# Patient Record
Sex: MALE | Race: WHITE | NOT HISPANIC OR LATINO | Employment: OTHER | URBAN - METROPOLITAN AREA
[De-identification: names, ages, dates, MRNs, and addresses within clinical notes are randomized per-mention and may not be internally consistent; named-entity substitution may affect disease eponyms.]

---

## 2017-02-27 DIAGNOSIS — N52.2 DRUG-INDUCED ERECTILE DYSFUNCTION: ICD-10-CM

## 2017-02-27 RX ORDER — TADALAFIL 10 MG/1
TABLET ORAL
Qty: 12 TABLET | Refills: 1 | Status: SHIPPED | OUTPATIENT
Start: 2017-02-27 | End: 2017-04-19

## 2017-02-28 ENCOUNTER — TELEPHONE (OUTPATIENT)
Dept: FAMILY MEDICINE | Facility: CLINIC | Age: 57
End: 2017-02-28

## 2017-02-28 DIAGNOSIS — N52.2 DRUG-INDUCED ERECTILE DYSFUNCTION: ICD-10-CM

## 2017-03-01 NOTE — TELEPHONE ENCOUNTER
Cialis       Last Written Prescription Date: 12/14/2016  Last Fill Quantity: 12,  # refills: 0   Last Office Visit with [facility], [facility] or [facility] prescribing provider: 11/5/2015

## 2017-03-02 RX ORDER — TADALAFIL 10 MG
TABLET ORAL
Qty: 12 TABLET | Refills: 0 | Status: SHIPPED
Start: 2017-03-02 | End: 2017-07-03

## 2017-03-02 NOTE — TELEPHONE ENCOUNTER
Your erectile dysfunction Medication is being filled for 1 time refill only due to:  Patient needs to be seen because it has been more than one year since last visit.   LOV with DR. Schoen, 11/4/15. ( MD note from that visit)   He can call for refills as needed. Recommended that he continue to see me once a year and cardiology once a year with our visits 6 months apart.   LOV with Cardiology , 5/3/16.  Will forward to schedulers to call and set up tommie with DR. Schoen.............................TRESA Lai

## 2017-04-19 ENCOUNTER — OFFICE VISIT (OUTPATIENT)
Dept: FAMILY MEDICINE | Facility: CLINIC | Age: 57
End: 2017-04-19
Payer: COMMERCIAL

## 2017-04-19 VITALS
DIASTOLIC BLOOD PRESSURE: 58 MMHG | BODY MASS INDEX: 28.31 KG/M2 | WEIGHT: 209 LBS | HEART RATE: 58 BPM | OXYGEN SATURATION: 98 % | HEIGHT: 72 IN | SYSTOLIC BLOOD PRESSURE: 110 MMHG | TEMPERATURE: 96.3 F

## 2017-04-19 DIAGNOSIS — E78.5 HYPERLIPIDEMIA LDL GOAL <70: ICD-10-CM

## 2017-04-19 DIAGNOSIS — I25.10 CORONARY ARTERY DISEASE INVOLVING NATIVE CORONARY ARTERY OF NATIVE HEART WITHOUT ANGINA PECTORIS: ICD-10-CM

## 2017-04-19 DIAGNOSIS — E78.5 HYPERLIPIDEMIA WITH TARGET LDL LESS THAN 100: ICD-10-CM

## 2017-04-19 DIAGNOSIS — I10 BENIGN ESSENTIAL HYPERTENSION: ICD-10-CM

## 2017-04-19 DIAGNOSIS — Z00.00 ROUTINE GENERAL MEDICAL EXAMINATION AT A HEALTH CARE FACILITY: Primary | ICD-10-CM

## 2017-04-19 DIAGNOSIS — Z11.59 NEED FOR HEPATITIS C SCREENING TEST: ICD-10-CM

## 2017-04-19 PROCEDURE — 99396 PREV VISIT EST AGE 40-64: CPT | Performed by: FAMILY MEDICINE

## 2017-04-19 RX ORDER — EZETIMIBE 10 MG/1
10 TABLET ORAL DAILY
Qty: 90 TABLET | Refills: 3 | Status: SHIPPED | OUTPATIENT
Start: 2017-04-19 | End: 2017-09-11

## 2017-04-19 RX ORDER — ATORVASTATIN CALCIUM 80 MG/1
80 TABLET, FILM COATED ORAL DAILY
Qty: 90 TABLET | Refills: 3 | Status: SHIPPED | OUTPATIENT
Start: 2017-04-19 | End: 2017-10-11

## 2017-04-19 RX ORDER — METOPROLOL SUCCINATE 50 MG/1
50 TABLET, EXTENDED RELEASE ORAL DAILY
Qty: 90 TABLET | Refills: 3 | Status: SHIPPED | OUTPATIENT
Start: 2017-04-19 | End: 2017-06-26

## 2017-04-19 RX ORDER — AMLODIPINE BESYLATE 5 MG/1
5 TABLET ORAL DAILY
Qty: 90 TABLET | Refills: 3 | Status: SHIPPED | OUTPATIENT
Start: 2017-04-19 | End: 2017-06-01

## 2017-04-19 RX ORDER — CLOPIDOGREL BISULFATE 75 MG/1
75 TABLET ORAL DAILY
Qty: 90 TABLET | Refills: 3 | Status: SHIPPED | OUTPATIENT
Start: 2017-04-19 | End: 2017-06-26

## 2017-04-19 NOTE — MR AVS SNAPSHOT
After Visit Summary   4/19/2017    Leonides Paul    MRN: [id number]           Patient Information     Date Of Birth          1960        Visit Information        Provider Department      4/19/2017 12:50 PM Schoen, Gregory G, MD [facility]        Today's Diagnoses     Routine general medical examination at a health care facility    -  1    Hyperlipidemia with target LDL less than 100        Coronary artery disease involving native coronary artery of native heart without angina pectoris        Need for hepatitis C screening test        Benign essential hypertension        Hyperlipidemia LDL goal <70          Care Instructions      Preventive Health Recommendations  Male Ages 50 - 64    Yearly exam:             See your health care provider every year in order to  o   Review health changes.   o   Discuss preventive care.    o   Review your medicines if your doctor has prescribed any.     Have a cholesterol test every 5 years, or more frequently if you are at risk for high cholesterol/heart disease.     Have a diabetes test (fasting glucose) every three years. If you are at risk for diabetes, you should have this test more often.     Have a colonoscopy at age 50, or have a yearly FIT test (stool test). These exams will check for colon cancer.      Talk with your health care provider about whether or not a prostate cancer screening test (PSA) is right for you.    You should be tested each year for STDs (sexually transmitted diseases), if you re at risk.     Shots: Get a flu shot each year. Get a tetanus shot every 10 years.     Nutrition:    Eat at least 5 servings of fruits and vegetables daily.     Eat whole-grain bread, whole-wheat pasta and brown rice instead of white grains and rice.     Talk to your provider about Calcium and Vitamin D.     Lifestyle    Exercise for at least 150 minutes a week (30 minutes a day, 5 days a week). This will help you control your weight and prevent  "disease.     Limit alcohol to one drink per day.     No smoking.     Wear sunscreen to prevent skin cancer.     See your dentist every six months for an exam and cleaning.     See your eye doctor every 1 to 2 years.          Follow-ups after your visit        Your next 10 appointments already scheduled     May 02, 2017  3:30 PM CDT   [facility] with Tay Hernandez MD   [facility]    [address] [place] [id number] [phone]              Who to contact     If you have questions or need follow up information about today's clinic visit or your schedule please contact [facility] directly at [phone].  Normal or non-critical lab and imaging results will be communicated to you by MyChart, letter or phone within 4 business days after the clinic has received the results. If you do not hear from us within 7 days, please contact the clinic through [employer] or phone. If you have a critical or abnormal lab result, we will notify you by phone as soon as possible.  Submit refill requests through [employer] or call your pharmacy and they will forward the refill request to us. Please allow 3 business days for your refill to be completed.          Additional Information About Your Visit        [employer] Information     [employer] lets you send messages to your doctor, view your test results, renew your prescriptions, schedule appointments and more. To sign up, go to [email or web address] . Click on \"Log in\" on the left side of the screen, which will take you to the Welcome page. Then click on \"Sign up Now\" on the right side of the page.     You will be asked to enter the access code listed below, as well as some personal information. Please follow the directions to create your username and password.     Your access code is: [email or web address]  Expires: 2017  4:49 PM     Your access code will  in 90 days. If you need help or a new code, please call " your [facility] clinic or [phone].        Care EveryWhere ID     This is your Care EveryWhere ID. This could be used by other organizations to access your [facility] medical records  [id number]        Your Vitals Were     Pulse Temperature Height Pulse Oximetry BMI (Body Mass Index)       58 96.3  F (35.7  C) (Temporal) 6' (1.829 m) 98% 28.35 kg/m2        Blood Pressure from Last 3 Encounters:   04/19/17 110/58   05/03/16 114/78   11/04/15 112/64    Weight from Last 3 Encounters:   04/19/17 209 lb (94.8 kg)   05/03/16 211 lb 8 oz (95.9 kg)   11/04/15 211 lb (95.7 kg)                 Where to get your medicines      These medications [facility] - [place], [place] - [address] AT [address] to [address] [facility], [facility] [address]     Phone:  [phone]     amLODIPine 5 MG tablet    atorvastatin 80 MG tablet    clopidogrel 75 MG tablet    ezetimibe 10 MG tablet    metoprolol 50 MG 24 hr tablet          Primary Care Provider Office Phone # Fax #    Gregory G Schoen, [phone]       [facility] [address] [place] [address]        Thank you!     Thank you for choosing [facility]  for your care. Our goal is always to provide you with excellent care. Hearing back from our patients is one way we can continue to improve our services. Please take a few minutes to complete the written survey that you may receive in the mail after your visit with us. Thank you!             Your Updated Medication List - Protect others around you: Learn how to safely use, store and throw away your medicines at www.disposemymeds.org.          This list is accurate as of: 4/19/17 11:59 PM.  Always use your most recent med list.                   Brand Name Dispense Instructions for use    * ACE/ARB NOT PRESCRIBED (INTENTIONAL)      by Other route continuous prn.       amLODIPine 5 MG tablet    NORVASC    90 tablet     Take 1 tablet (5 mg) by mouth daily       * ASPIRIN NOT PRESCRIBED    INTENTIONAL     by Other route continuous prn.       atorvastatin 80 MG tablet    LIPITOR    90 tablet    Take 1 tablet (80 mg) by mouth daily       CIALIS 10 MG tablet   Generic drug:  tadalafil     12 tablet    FOR DIRECTIONS ON HOW TO   TAKE THIS MEDICINE, READ   THE ENCLOSED MEDICATION    INFORMATION FORM       clopidogrel 75 MG tablet    PLAVIX    90 tablet    Take 1 tablet (75 mg) by mouth daily       ezetimibe 10 MG tablet    ZETIA    90 tablet    Take 1 tablet (10 mg) by mouth daily       metoprolol 50 MG 24 hr tablet    TOPROL XL    90 tablet    Take 1 tablet (50 mg) by mouth daily       triamcinolone 0.5 % cream    KENALOG    30 g    Apply to affected areas twice daily       * Notice:  This list has 2 medication(s) that are the same as other medications prescribed for you. Read the directions carefully, and ask your doctor or other care provider to review them with you.

## 2017-04-19 NOTE — PROGRESS NOTES
"  SUBJECTIVE:     CC: Leonides Paul is an 56 year old male who presents for preventative health visit.     States he is doing well overall. He \"tweaked\" has left shoulder over the winter and it is slowly getting better but still can feel discomfort. His biggest concern is what it might do to his golf game.      He also is on a new biologic for his psoriasis rash and arthritis and states it does an exceptional job of clearing his skin but is about the same benefit for his joints.  He has not had side effect issues from his meds.      He continues to follow with cardiology and has been very stable with medication management of his coronary disease. He needs labs for his upcoming appointment with Dr. Hernandez.      Healthy Habits:    Do you get at least three servings of calcium containing foods daily (dairy, green leafy vegetables, etc.)? yes    Amount of exercise or daily activities, outside of work: 7 day(s) per week    Problems taking medications regularly No    Medication side effects: No    Have you had an eye exam in the past two years? no    Do you see a dentist twice per year? yes    Do you have sleep apnea, excessive snoring or daytime drowsiness?Patient states that he's having trouble sleeping over the last 2 weeks.      Today's PHQ-2 Score:   PHQ-2 ( 1999 [employer]) 11/4/2015 10/12/2011   Q1: Little interest or pleasure in doing things 0 0   Q2: Feeling down, depressed or hopeless 0 0   PHQ-2 Score 0 0       Abuse: Current or Past(Physical, Sexual or Emotional)- No  Do you feel safe in your environment - Yes    Social History   Substance Use Topics     Smoking status: Former Smoker     Packs/day: 2.00     Years: 12.00     Smokeless tobacco: Not on file      Comment: Quit at age 25     Alcohol use Yes      Comment: beer weekly     The patient does not drink >3 drinks per day nor >7 drinks per week.    Last PSA: No results found for: PSA    Recent Labs   Lab Test  04/30/16   0810  05/04/15   1115  05/14/14   " 0723   CHOL  112  128  117   HDL  32*  45  35*   LDL  65  68  70   TRIG  76  75  61   CHOLHDLRATIO   --   2.8  3.0   NHDL  80   --    --        Reviewed orders with patient. Reviewed health maintenance and updated orders accordingly - Yes    Reviewed and updated as needed this visit by clinical staff         Reviewed and updated as needed this visit by Provider            ROS:  CONSTITUTIONAL:trouble sleeping the past few weeks. Not sure what is different but now goes to bed worrying about not getting sleep which makes matters worse.  He just started taking melatonin last night and wants to see how this will work.   I: Psoriasis controlled.   E: NEGATIVE for vision changes or irritation  ENT: NEGATIVE for ear, mouth and throat problems  R: NEGATIVE for significant cough or SOB  CV: NEGATIVE for chest pain, palpitations or peripheral edema  GI: NEGATIVE for nausea, abdominal pain, heartburn, or change in bowel habits   male: negative for dysuria, hematuria, decreased urinary stream, erectile dysfunction (with viagra), urethral discharge  M: NEGATIVE for significant arthralgias or myalgia  N: NEGATIVE for weakness, dizziness or paresthesias  P: NEGATIVE for changes in mood or affect      OBJECTIVE:     /58 (BP Location: Left arm, Patient Position: Chair, Cuff Size: Adult Regular)  Pulse 58  Temp 96.3  F (35.7  C) (Temporal)  Ht 6' (1.829 m)  Wt 209 lb (94.8 kg)  SpO2 98%  BMI 28.35 kg/m2  EXAM:  GENERAL: healthy, alert and no distress  EYES: Eyes grossly normal to inspection, PERRL and conjunctivae and sclerae normal  HENT: ear canals with a fair amount of wax (he has irrigation solution at home) and TM's normal, nose and mouth without ulcers or lesions  NECK: no adenopathy, no asymmetry, masses, or scars and thyroid normal to palpation  RESP: lungs clear to auscultation - no rales, rhonchi or wheezes  CV: regular rate and rhythm, normal S1 S2, no S3 or S4, no murmur, click or rub, no peripheral edema  and peripheral pulses strong  ABDOMEN: soft, nontender, no hepatosplenomegaly, no masses and bowel sounds normal  MS: no gross musculoskeletal defects noted, no edema  SKIN: no suspicious lesions or rashes  NEURO: Normal strength and tone, mentation intact and speech normal  PSYCH: mentation appears normal, affect normal/bright    ASSESSMENT/PLAN:     (Z00.00) Routine general medical examination at a health care facility  (primary encounter diagnosis)  Comment: Overall in stable health with underlying problems as outlined below well managed. He does have advanced directive at home and will bring that in for his chart. Due for lipids as part of his cardiac monitoring along with renal assessment.   Plan: Lipid Profile with reflex to direct LDL,         Creatinine, ** Albumin Random Urine Quant         FUTURE 1yr            (E78.5) Hyperlipidemia with target LDL less than 100  Comment: Has been stable on current dose of zetia and lipitor 80 mg; labs due.   Plan: Lipid Profile with reflex to direct LDL,         Creatinine, ** Albumin Random Urine Quant         FUTURE 1yr, ALT            (I25.10) Coronary artery disease involving native coronary artery of native heart without angina pectoris  Comment: Stable without angina on medication management. Will renew meds and order labs for his follow up cardiology visit.   Plan: metoprolol (TOPROL XL) 50 MG 24 hr tablet,         clopidogrel (PLAVIX) 75 MG tablet            (Z11.59) Need for hepatitis C screening test  Comment: Some risk factors present. lab ordered.   Plan: **Hepatitis C Screen Reflex to RNA FUTURE         anytime            (I10) Benign essential hypertension  Comment: Stable on current meds; will renew without change.   Plan: amLODIPine (NORVASC) 5 MG tablet            (E78.5) Hyperlipidemia LDL goal <70  Comment: As above.   Plan: atorvastatin (LIPITOR) 80 MG tablet, ezetimibe         (ZETIA) 10 MG tablet              COUNSELING:  Reviewed preventive health  counseling, as reflected in patient instructions       Regular exercise       Healthy diet/nutrition         reports that he has quit smoking. He has a 24.00 pack-year smoking history. He does not have any smokeless tobacco history on file.    Estimated body mass index is 28.68 kg/(m^2) as calculated from the following:    Height as of 5/3/16: 6' (1.829 m).    Weight as of 5/3/16: 211 lb 8 oz (95.9 kg).       Counseling Resources:  ATP IV Guidelines  Pooled Cohorts Equation Calculator  FRAX Risk Assessment  ICSI Preventive Guidelines  Dietary Guidelines for Americans, 2010  USDA's MyPlate  ASA Prophylaxis  Lung CA Screening    Gregory G. Schoen, MD  [facility]

## 2017-04-19 NOTE — NURSING NOTE
Chief Complaint   Patient presents with     Physical     Insomnia     x 2 weeks     Shoulder left     x 1 year       Initial /58 (BP Location: Left arm, Patient Position: Chair, Cuff Size: Adult Regular)  Pulse 58  Temp 96.3  F (35.7  C) (Temporal)  Ht 6' (1.829 m)  Wt 209 lb (94.8 kg)  SpO2 98%  BMI 28.35 kg/m2 Estimated body mass index is 28.35 kg/(m^2) as calculated from the following:    Height as of this encounter: 6' (1.829 m).    Weight as of this encounter: 209 lb (94.8 kg).  Medication Reconciliation: complete  Tea Thompson CMA

## 2017-04-25 ENCOUNTER — TELEPHONE (OUTPATIENT)
Dept: NURSING | Facility: CLINIC | Age: 57
End: 2017-04-25

## 2017-04-25 ENCOUNTER — TELEPHONE (OUTPATIENT)
Dept: FAMILY MEDICINE | Facility: CLINIC | Age: 57
End: 2017-04-25

## 2017-04-25 NOTE — TELEPHONE ENCOUNTER
Clinic Action Needed:Yes  FNA Triage Call  Presenting Problem: Pt was in the clinic on 4/19/17 for general exam and sleep problems, he reports he still is having difficulty sleeping and is wondering what the next step would be?  Routed to: P [id number]  Briana Ruiz MA RN, [facility]

## 2017-04-25 NOTE — TELEPHONE ENCOUNTER
Call Type: Triage Call    Presenting Problem: Clinic Action Needed:Yes  FNA Triage Call  Presenting Problem: Pt was in the clinic on 4/19/17 for general exam  and sleep problems, he reports he still is having difficulty  sleeping and is wondering what the next step would be?  Routed to: P  54262  Briana Ruiz MA, RN, [facility]  Triage Note:  Guideline Title: Sleep Disorders ; Sleep Disorders  Recommended Disposition: See Provider within 72 Hours  Original Inclination: Would have called clinic  Override Disposition: Call Provider within 24 Hours  Intended Action: Follow advice given  Physician Contacted: No  Persistent insomnia resulting in less than 4 hours sleep per night AND not  previously evaluated ?  YES  Symptoms of depression (hopeless, despondent, crying episodes) ? NO  Severe episodes of sleep apnea AND irregular heart beat, breathing problems, or  too dizzy or lightheaded to stand ? NO  Sleep deprivation AND irritable, agitated or multiple episodes of apnea per night  ? NO  Sleep deprivation AND known bipolar disorder or recently stopped sleep medication,  narcotics, alcohol or illicit drugs ? NO  Any homicidal/destructive ideation, any suicidal ideation, any history of suicide  attempts, and/or any history of self destructive behavior ? NO  Symptoms began after a change or starting a new prescription or nonprescription  medicine or alternative medicine / therapy within last 4 weeks ? NO  Sleep deprivation AND active hallucinations, delusions, change in mental status,  or under the influence of alcohol or drugs (legal or illegal) ? NO  Physician Instructions:  Care Advice: Monitor sleep behavior (keep a diary) to establish baseline.  Identify factors that make symptoms worse or more frequent.  CAUTIONS  Avoid the use of stimulants including caffeine (coffee, some soft drinks,  some energy drinks, tea and chocolate), cocaine, and amphetamines.  Also  avoid drinking alcohol.  After provider  evaluation, get at least 30-60 minutes of moderate aerobic  exercise, preferably each day  exercise 3 to 4 hours before you want to sleep. Moderate aerobic exercise  is generally defined as requiring about as much energy as walking 2 miles  in 30 minutes.  Maintain a nutritious diet, with a healthy balance of fresh, natural foods  If recent change in medications (prescription, non prescription, or  alternative), call provider or mental healthcare provider.

## 2017-04-26 NOTE — TELEPHONE ENCOUNTER
Patient called back and has not heard anything yet from Dr. Schoen. Can you please call back when able.  Thank you,  Vera Roberto   for [facility]

## 2017-04-28 ENCOUNTER — TELEPHONE (OUTPATIENT)
Dept: NURSING | Facility: CLINIC | Age: 57
End: 2017-04-28

## 2017-04-28 ENCOUNTER — TELEPHONE (OUTPATIENT)
Dept: FAMILY MEDICINE | Facility: CLINIC | Age: 57
End: 2017-04-28

## 2017-04-28 NOTE — TELEPHONE ENCOUNTER
I called and spoke to the patient. Patient is currently in [place] and unable to see a different provider today here in [place] for his symptoms.   He stated that in the last 2 1/2 weeks he has only had 15 hours of sleep.  At this point her doesn't know if it is a sleeping issue or if it is Anxiety about not sleeping.  I did inform the patient that Dr. Wilson is out till Monday. Provider please advise.  Estevan Joe MA

## 2017-04-28 NOTE — TELEPHONE ENCOUNTER
Call Type: Triage Call    Presenting Problem: Leonides states that he has called the clinic for  the last two days, and has not heard back yet. He says he is still  having sleep issues and wondering what he should do. He would like a  message sent to Dr. Schoen.  Triage Note:  Guideline Title: Information Only Call; No Symptom Triage (Adult)  Recommended Disposition: Provide Information or Advice Only  Original Inclination: Wanted to speak with a nurse  Override Disposition:  Intended Action: Follow advice given  Physician Contacted: No  Follow-up call to recent contact; no triage required. Information provided from  past call documentation, approved references or experience. ?  YES  Requesting regular office appointment ? NO  Sign(s) or symptom(s) associated with a diagnosed condition or with a new illness  ? NO  Requesting information about provider, services or community resources ? NO  Call back to complete assessment/clarification of information from prior caller to  complete triage ? NO  Requesting information and provider is best resource; no triage required. ? NO  Caller not with patient and is unable to provide clinical information about  patient to facilitate triage. ? NO  Requesting provider information for recently scheduled test, procedure; no triage  required. Needed information not available per approved resources or clinical  experience. ? NO  Requesting information not available per approved reference or clinical  experience; no triage required. ? NO  Requesting information regarding scheduled exam, test or procedure; no triage  required. Information provided from approved resources or clinical experience. ?  NO  General information question; no triage required. Information provided from  approved references or knowledge of organization. ? NO  Health information question; person denies any symptoms, no triage required.  Information provided from approved references or clinical experience. ?  NO  Physician Instructions:  Care Advice:

## 2017-04-28 NOTE — TELEPHONE ENCOUNTER
Leonides called the nurse triage line 04/28/2017 @ 8225, requesting a message sent to Dr. Schoen. He states he has called the clinic for the last two days, requesting to hear back from Dr. Schoen. He is still have sleep issues and is wondering what he should do. Please call him at [phone]. Thank you.     Neris Sung RN FNA    Routed to: Dr. Gregory Schoen, and RN roberto

## 2017-04-29 DIAGNOSIS — Z00.00 ROUTINE GENERAL MEDICAL EXAMINATION AT A HEALTH CARE FACILITY: ICD-10-CM

## 2017-04-29 DIAGNOSIS — Z11.59 NEED FOR HEPATITIS C SCREENING TEST: ICD-10-CM

## 2017-04-29 DIAGNOSIS — E78.5 HYPERLIPIDEMIA WITH TARGET LDL LESS THAN 100: ICD-10-CM

## 2017-04-29 LAB
ALT SERPL W P-5'-P-CCNC: 48 U/L (ref 0–70)
CHOLEST SERPL-MCNC: 108 MG/DL
CREAT SERPL-MCNC: 0.82 MG/DL (ref 0.66–1.25)
GFR SERPL CREATININE-BSD FRML MDRD: NORMAL ML/MIN/1.7M2
HDLC SERPL-MCNC: 39 MG/DL
LDLC SERPL CALC-MCNC: 57 MG/DL
NONHDLC SERPL-MCNC: 69 MG/DL
TRIGL SERPL-MCNC: 59 MG/DL

## 2017-04-29 PROCEDURE — 36415 COLL VENOUS BLD VENIPUNCTURE: CPT | Performed by: FAMILY MEDICINE

## 2017-04-29 PROCEDURE — 87522 HEPATITIS C REVRS TRNSCRPJ: CPT | Performed by: FAMILY MEDICINE

## 2017-04-29 PROCEDURE — 80061 LIPID PANEL: CPT | Performed by: FAMILY MEDICINE

## 2017-04-29 PROCEDURE — 86803 HEPATITIS C AB TEST: CPT | Performed by: FAMILY MEDICINE

## 2017-04-29 PROCEDURE — 82565 ASSAY OF CREATININE: CPT | Performed by: FAMILY MEDICINE

## 2017-04-29 PROCEDURE — 84460 ALANINE AMINO (ALT) (SGPT): CPT | Performed by: FAMILY MEDICINE

## 2017-05-01 LAB — HCV AB SERPL QL IA: ABNORMAL

## 2017-05-02 ENCOUNTER — OFFICE VISIT (OUTPATIENT)
Dept: CARDIOLOGY | Facility: CLINIC | Age: 57
End: 2017-05-02
Attending: INTERNAL MEDICINE
Payer: COMMERCIAL

## 2017-05-02 VITALS
WEIGHT: 210.2 LBS | HEIGHT: 72 IN | OXYGEN SATURATION: 97 % | BODY MASS INDEX: 28.47 KG/M2 | HEART RATE: 52 BPM | DIASTOLIC BLOOD PRESSURE: 82 MMHG | SYSTOLIC BLOOD PRESSURE: 118 MMHG

## 2017-05-02 DIAGNOSIS — E78.5 HYPERLIPIDEMIA LDL GOAL <70: Primary | ICD-10-CM

## 2017-05-02 DIAGNOSIS — I25.10 CORONARY ARTERY DISEASE INVOLVING NATIVE CORONARY ARTERY OF NATIVE HEART WITHOUT ANGINA PECTORIS: ICD-10-CM

## 2017-05-02 DIAGNOSIS — I10 BENIGN ESSENTIAL HYPERTENSION: ICD-10-CM

## 2017-05-02 PROCEDURE — 99214 OFFICE O/P EST MOD 30 MIN: CPT | Performed by: INTERNAL MEDICINE

## 2017-05-02 NOTE — PROGRESS NOTES
HPI and Plan:   See dictation    Orders Placed This Encounter   Procedures     Lipid Profile     ALT     Basic metabolic panel     Follow-Up with Cardiologist       Orders Placed This Encounter   Medications     UNABLE TO FIND     Sig: MEDICATION NAME: Taltz injection 70mg once per month     MELATONIN PO     Sig: Take by mouth At Bedtime       There are no discontinued medications.      Encounter Diagnoses   Name Primary?     Coronary artery disease involving native coronary artery of native heart without angina pectoris      Hyperlipidemia LDL goal <70 Yes     Benign essential hypertension        CURRENT MEDICATIONS:  Current Outpatient Prescriptions   Medication Sig Dispense Refill     UNABLE TO FIND MEDICATION NAME: Taltz injection 70mg once per month       MELATONIN PO Take by mouth At Bedtime       amLODIPine (NORVASC) 5 MG tablet Take 1 tablet (5 mg) by mouth daily 90 tablet 3     atorvastatin (LIPITOR) 80 MG tablet Take 1 tablet (80 mg) by mouth daily 90 tablet 3     metoprolol (TOPROL XL) 50 MG 24 hr tablet Take 1 tablet (50 mg) by mouth daily 90 tablet 3     clopidogrel (PLAVIX) 75 MG tablet Take 1 tablet (75 mg) by mouth daily 90 tablet 3     ezetimibe (ZETIA) 10 MG tablet Take 1 tablet (10 mg) by mouth daily 90 tablet 3     CIALIS 10 MG tablet FOR DIRECTIONS ON HOW TO   TAKE THIS MEDICINE, READ   THE ENCLOSED MEDICATION    INFORMATION FORM 12 tablet 0     triamcinolone (KENALOG) 0.5 % cream Apply to affected areas twice daily 30 g 2     ASPIRIN NOT PRESCRIBED, INTENTIONAL, by Other route continuous prn.  0     ACE/ARB NOT PRESCRIBED, INTENTIONAL, by Other route continuous prn.         ALLERGIES     Allergies   Allergen Reactions     No Known Drug Allergies        PAST MEDICAL HISTORY:  Past Medical History:   Diagnosis Date     CAD (coronary artery disease) 7/19/2010     Hyperlipidemia LDL goal < 100 7/19/2010     Hypertension 7/19/2010     Other psoriasis 12/15/2005       PAST SURGICAL HISTORY:  Past  Surgical History:   Procedure Laterality Date     COLONOSCOPY  02/10/10      LAPAROSCOPY, SURGICAL; APPENDECTOMY  7/17/10     REMOVAL OF SPERM DUCT(S)         FAMILY HISTORY:  Family History   Problem Relation Age of Onset     Allergies Daughter      environment al     Allergies Son      same as sister     Alzheimer Disease Father       at age 73     Blood Disease Father      CANCER Maternal Grandfather      [place] lung -     HEART DISEASE Maternal Grandmother      Psychotic Disorder Brother        SOCIAL HISTORY:  Social History     Social History     Marital status:      Spouse name: N/A     Number of children: N/A     Years of education: N/A     Social History Main Topics     Smoking status: Former Smoker     Packs/day: 2.00     Years: 12.00     Smokeless tobacco: Not on file      Comment: Quit at age 25     Alcohol use Yes      Comment: beer weekly     Drug use: No     Sexual activity: Yes     Partners: Female     Other Topics Concern     Not on file     Social History Narrative       Review of Systems:  Skin:  Positive for   psoriasis   Eyes:  Positive for   lasik  ENT:  Negative      Respiratory:  Negative       Cardiovascular:  Negative for;palpitations;chest pain;edema;lightheadedness;dizziness fatigue;Positive for    Gastroenterology: Negative      Genitourinary:  Negative      Musculoskeletal:  Positive for arthritis    Neurologic:  Negative      Psychiatric:  Positive for sleep disturbances 3 weeks of not sleeping well   Heme/Lymph/Imm:  Negative      Endocrine:  Negative        Physical Exam:  Vitals: /82 (BP Location: Right arm, Patient Position: Fowlers, Cuff Size: Adult Large)  Pulse 52  Ht 1.829 m (6')  Wt 95.3 kg (210 lb 3.2 oz)  SpO2 97%  BMI 28.51 kg/m2    Constitutional:  cooperative, alert and oriented, well developed, well nourished, in no acute distress        Skin:  warm and dry to the touch, no apparent skin lesions or masses noted        Head:  normocephalic, no  masses or lesions        Eyes:  pupils equal and round, conjunctivae and lids unremarkable, sclera white, no xanthalasma, EOMS intact, no nystagmus        ENT:  no pallor or cyanosis, dentition good        Neck:  carotid pulses are full and equal bilaterally, JVP normal, no carotid bruit, no thyromegaly        Chest:  normal breath sounds, clear to auscultation, normal A-P diameter, normal symmetry, normal respiratory excursion, no use of accessory muscles;clear to auscultation          Cardiac: regular rhythm, normal S1/S2, no S3 or S4, apical impulse not displaced, no murmurs, gallops or rubs                  Abdomen:  abdomen soft, non-tender, BS normoactive, no mass, no HSM, no bruits        Vascular: pulses full and equal, no bruits auscultated                                        Extremities and Back:  no deformities, clubbing, cyanosis, erythema observed;no edema              Neurological:  affect appropriate, oriented to time, person and place;no gross motor deficits              CC  Tay Hernandez MD   [facility]  [address]  [place] [address]

## 2017-05-02 NOTE — LETTER
5/2/2017    Gregory G. Schoen, MD  [facility]   [address]   [place] [address]    RE: Leonides Paul       Dear Colleague,    I had the opportunity to see Mr. Leonides Paul in Cardiology Clinic today at the [facility] in [place] for reevaluation of coronary artery disease with chronic angina.  As you know, Leonides has a chronically occluded mid LAD with good distal collaterals.  He has been experiencing occasional mild exertional angina for more than 10 years, but we have managed this with medications and it has not affected his lifestyle.  He indicates that he still gets some angina when he goes out for his 3-mile walk.  Typically it occurs during the first mile and is brief and resolves fairly promptly and then he is able to walk for a couple more miles without any discomfort whatsoever.  He believes that over the last 5-10 years, the discomfort may have gotten slowly and slightly worse and he does believe it is perhaps a bit worse than it was a year ago, but he is not bothered by it enough at this point to indicate that he needs any additional treatment.      Currently, he has been bothered by some severe insomnia issues.  He saw you to discuss that recently.      PHYSICAL EXAMINATION:  Today his blood pressure was 118/82, heart rate 52 and weight 210 pounds.  His lungs are clear.  His heart rhythm is regular.  He has no cardiac murmurs or carotid bruits.      His cholesterol numbers remain excellent.  He has normal renal function.     Outpatient Encounter Prescriptions as of 5/2/2017   Medication Sig Dispense Refill     UNABLE TO FIND MEDICATION NAME: Taltz injection 70mg once per month       MELATONIN PO Take by mouth At Bedtime       atorvastatin (LIPITOR) 80 MG tablet Take 1 tablet (80 mg) by mouth daily 90 tablet 3     ezetimibe (ZETIA) 10 MG tablet Take 1 tablet (10 mg) by mouth daily 90 tablet 3     [DISCONTINUED] amLODIPine (NORVASC) 5 MG tablet Take 1  tablet (5 mg) by mouth daily 90 tablet 3     [DISCONTINUED] metoprolol (TOPROL XL) 50 MG 24 hr tablet Take 1 tablet (50 mg) by mouth daily 90 tablet 3     [DISCONTINUED] clopidogrel (PLAVIX) 75 MG tablet Take 1 tablet (75 mg) by mouth daily 90 tablet 3     [DISCONTINUED] CIALIS 10 MG tablet FOR DIRECTIONS ON HOW TO   TAKE THIS MEDICINE, READ   THE ENCLOSED MEDICATION    INFORMATION FORM 12 tablet 0     triamcinolone (KENALOG) 0.5 % cream Apply to affected areas twice daily 30 g 2     ASPIRIN NOT PRESCRIBED, INTENTIONAL, by Other route continuous prn.  0     ACE/ARB NOT PRESCRIBED, INTENTIONAL, by Other route continuous prn.       No facility-administered encounter medications on file as of 5/2/2017.       IMPRESSIONS:  Mr. Leonides Paul is a 56-year-old gentleman with chronic ischemic coronary artery disease with a chronically occluded mid LAD which good collaterals to the distal LAD.  He has some persistent exertional angina but still quite mild.  I offered him some additional medical therapy to help decrease those symptoms such as long-acting nitrate or Ranexa.  At this time, he declines additional medication and wishes to continue his current medication program.  I encouraged him to contact us if he decides that his chest discomfort symptoms are bothersome enough to require additional treatment.      I printed off some information for him from [employer] Date about insomnia.  I will have him come back to discuss those issues with you further if he continues to be bothered by that problem.      I will plan to see him back again in 1 year.     Again, thank you for allowing me to participate in the care of your patient.      Sincerely,    MENDOZA BATES MD     [facility]

## 2017-05-02 NOTE — TELEPHONE ENCOUNTER
"I spoke with Leonides and he purchased an over the counter \"sleep gel\" capsule,which, after exploration, was diphenhydramine.  He has slept well the past two nights.  Will simply observe at this time. I did review his labs with him and his Hep C screen is still pending.   Electronically signed by Greg Schoen, MD    "

## 2017-05-02 NOTE — MR AVS SNAPSHOT
"              After Visit Summary   5/2/2017    Leonides Paul    MRN: [id number]           Patient Information     Date Of Birth          1960        Visit Information        Provider Department      5/2/2017 3:30 PM Tay Hernandez MD [facility]        Today's Diagnoses     Hyperlipidemia LDL goal <70    -  1    Coronary artery disease involving native coronary artery of native heart without angina pectoris        Benign essential hypertension           Follow-ups after your visit        Additional Services     Follow-Up with Cardiologist                 Future tests that were ordered for you today     Open Future Orders        Priority Expected Expires Ordered    ALT Routine 5/2/2018 6/6/2018 5/2/2017    Basic metabolic panel Routine 5/2/2018 6/6/2018 5/2/2017    Follow-Up with Cardiologist Routine 5/2/2018 9/14/2018 5/2/2017    Lipid Profile Routine 5/2/2018 6/6/2018 5/2/2017            Who to contact     If you have questions or need follow up information about today's clinic visit or your schedule please contact [facility] directly at [phone].  Normal or non-critical lab and imaging results will be communicated to you by "[employer]hart, letter or phone within 4 business days after the clinic has received the results. If you do not hear from us within 7 days, please contact the clinic through "[employer]hart or phone. If you have a critical or abnormal lab result, we will notify you by phone as soon as possible.  Submit refill requests through [employer] or call your pharmacy and they will forward the refill request to us. Please allow 3 business days for your refill to be completed.          Additional Information About Your Visit        MyChart Information     [employer] lets you send messages to your doctor, view your test results, renew your prescriptions, schedule appointments and more. To sign up, go to [email or web address] . Click on \"Log in\" on the left side of the screen, " "which will take you to the Welcome page. Then click on \"Sign up Now\" on the right side of the page.     You will be asked to enter the access code listed below, as well as some personal information. Please follow the directions to create your username and password.     Your access code is: [email or web address]  Expires: 2017  4:49 PM     Your access code will  in 90 days. If you need help or a new code, please call your [facility] or [phone].        Care EveryWhere ID     This is your Care EveryWhere ID. This could be used by other organizations to access your [facility] medical records  [id number]        Your Vitals Were     Pulse Height Pulse Oximetry BMI (Body Mass Index)          52 1.829 m (6') 97% 28.51 kg/m2         Blood Pressure from Last 3 Encounters:   17 118/82   17 110/58   16 114/78    Weight from Last 3 Encounters:   17 95.3 kg (210 lb 3.2 oz)   17 94.8 kg (209 lb)   16 95.9 kg (211 lb 8 oz)              We Performed the Following     Follow-Up with Cardiologist        Primary Care Provider Office Phone # Fax #    Gregory G Schoen, [phone]       [facility] [address] [place] [address]        Thank you!     Thank you for choosing [facility]  for your care. Our goal is always to provide you with excellent care. Hearing back from our patients is one way we can continue to improve our services. Please take a few minutes to complete the written survey that you may receive in the mail after your visit with us. Thank you!             Your Updated Medication List - Protect others around you: Learn how to safely use, store and throw away your medicines at www.disposemymeds.org.          This list is accurate as of: 17  3:59 PM.  Always use your most recent med list.                   Brand Name Dispense Instructions for use    * ACE/ARB NOT PRESCRIBED (INTENTIONAL)      by Other route continuous " prn.       amLODIPine 5 MG tablet    NORVASC    90 tablet    Take 1 tablet (5 mg) by mouth daily       * ASPIRIN NOT PRESCRIBED    INTENTIONAL     by Other route continuous prn.       atorvastatin 80 MG tablet    LIPITOR    90 tablet    Take 1 tablet (80 mg) by mouth daily       CIALIS 10 MG tablet   Generic drug:  tadalafil     12 tablet    FOR DIRECTIONS ON HOW TO   TAKE THIS MEDICINE, READ   THE ENCLOSED MEDICATION    INFORMATION FORM       clopidogrel 75 MG tablet    PLAVIX    90 tablet    Take 1 tablet (75 mg) by mouth daily       ezetimibe 10 MG tablet    ZETIA    90 tablet    Take 1 tablet (10 mg) by mouth daily       MELATONIN PO      Take by mouth At Bedtime       metoprolol 50 MG 24 hr tablet    TOPROL XL    90 tablet    Take 1 tablet (50 mg) by mouth daily       triamcinolone 0.5 % cream    KENALOG    30 g    Apply to affected areas twice daily       UNABLE TO FIND      MEDICATION NAME: Taltz injection 70mg once per month       * Notice:  This list has 2 medication(s) that are the same as other medications prescribed for you. Read the directions carefully, and ask your doctor or other care provider to review them with you.

## 2017-05-02 NOTE — LETTER
5/2/2017      RE: Leonides Paul  [address] [facility] [address]       Dear Colleague,    Thank you for the opportunity to participate in the care of your patient, Leonides Paul, at the [facility]. Please see a copy of my visit note below.    HPI and Plan:   See dictation    Orders Placed This Encounter   Procedures     Lipid Profile     ALT     Basic metabolic panel     Follow-Up with Cardiologist       Orders Placed This Encounter   Medications     UNABLE TO FIND     Sig: MEDICATION NAME: Taltz injection 70mg once per month     MELATONIN PO     Sig: Take by mouth At Bedtime       There are no discontinued medications.      Encounter Diagnoses   Name Primary?     Coronary artery disease involving native coronary artery of native heart without angina pectoris      Hyperlipidemia LDL goal <70 Yes     Benign essential hypertension        CURRENT MEDICATIONS:  Current Outpatient Prescriptions   Medication Sig Dispense Refill     UNABLE TO FIND MEDICATION NAME: Taltz injection 70mg once per month       MELATONIN PO Take by mouth At Bedtime       amLODIPine (NORVASC) 5 MG tablet Take 1 tablet (5 mg) by mouth daily 90 tablet 3     atorvastatin (LIPITOR) 80 MG tablet Take 1 tablet (80 mg) by mouth daily 90 tablet 3     metoprolol (TOPROL XL) 50 MG 24 hr tablet Take 1 tablet (50 mg) by mouth daily 90 tablet 3     clopidogrel (PLAVIX) 75 MG tablet Take 1 tablet (75 mg) by mouth daily 90 tablet 3     ezetimibe (ZETIA) 10 MG tablet Take 1 tablet (10 mg) by mouth daily 90 tablet 3     CIALIS 10 MG tablet FOR DIRECTIONS ON HOW TO   TAKE THIS MEDICINE, READ   THE ENCLOSED MEDICATION    INFORMATION FORM 12 tablet 0     triamcinolone (KENALOG) 0.5 % cream Apply to affected areas twice daily 30 g 2     ASPIRIN NOT PRESCRIBED, INTENTIONAL, by Other route continuous prn.  0     ACE/ARB NOT PRESCRIBED, INTENTIONAL, by Other route continuous prn.          ALLERGIES     Allergies   Allergen Reactions     No Known Drug Allergies        PAST MEDICAL HISTORY:  Past Medical History:   Diagnosis Date     CAD (coronary artery disease) 2010     Hyperlipidemia LDL goal < 100 2010     Hypertension 2010     Other psoriasis 12/15/2005       PAST SURGICAL HISTORY:  Past Surgical History:   Procedure Laterality Date     COLONOSCOPY  02/10/10     HC LAPAROSCOPY, SURGICAL; APPENDECTOMY  7/17/10     REMOVAL OF SPERM DUCT(S)         FAMILY HISTORY:  Family History   Problem Relation Age of Onset     Allergies Daughter      environment al     Allergies Son      same as sister     Alzheimer Disease Father       at age 73     Blood Disease Father      CANCER Maternal Grandfather      [place] lung -     HEART DISEASE Maternal Grandmother      Psychotic Disorder Brother        SOCIAL HISTORY:  Social History     Social History     Marital status:      Spouse name: N/A     Number of children: N/A     Years of education: N/A     Social History Main Topics     Smoking status: Former Smoker     Packs/day: 2.00     Years: 12.00     Smokeless tobacco: Not on file      Comment: Quit at age 25     Alcohol use Yes      Comment: beer weekly     Drug use: No     Sexual activity: Yes     Partners: Female     Other Topics Concern     Not on file     Social History Narrative       Review of Systems:  Skin:  Positive for   psoriasis   Eyes:  Positive for   lasik  ENT:  Negative      Respiratory:  Negative       Cardiovascular:  Negative for;palpitations;chest pain;edema;lightheadedness;dizziness fatigue;Positive for    Gastroenterology: Negative      Genitourinary:  Negative      Musculoskeletal:  Positive for arthritis    Neurologic:  Negative      Psychiatric:  Positive for sleep disturbances 3 weeks of not sleeping well   Heme/Lymph/Imm:  Negative      Endocrine:  Negative        Physical Exam:  Vitals: /82 (BP Location: Right arm, Patient Position: Fowlers,  Cuff Size: Adult Large)  Pulse 52  Ht 1.829 m (6')  Wt 95.3 kg (210 lb 3.2 oz)  SpO2 97%  BMI 28.51 kg/m2    Constitutional:  cooperative, alert and oriented, well developed, well nourished, in no acute distress        Skin:  warm and dry to the touch, no apparent skin lesions or masses noted        Head:  normocephalic, no masses or lesions        Eyes:  pupils equal and round, conjunctivae and lids unremarkable, sclera white, no xanthalasma, EOMS intact, no nystagmus        ENT:  no pallor or cyanosis, dentition good        Neck:  carotid pulses are full and equal bilaterally, JVP normal, no carotid bruit, no thyromegaly        Chest:  normal breath sounds, clear to auscultation, normal A-P diameter, normal symmetry, normal respiratory excursion, no use of accessory muscles;clear to auscultation          Cardiac: regular rhythm, normal S1/S2, no S3 or S4, apical impulse not displaced, no murmurs, gallops or rubs                  Abdomen:  abdomen soft, non-tender, BS normoactive, no mass, no HSM, no bruits        Vascular: pulses full and equal, no bruits auscultated                                        Extremities and Back:  no deformities, clubbing, cyanosis, erythema observed;no edema              Neurological:  affect appropriate, oriented to time, person and place;no gross motor deficits              CC  Tay Hernandez MD   [facility]  [address]  [place] [address]        Please do not hesitate to contact me if you have any questions/concerns.     Sincerely,     TAY HERNANDEZ MD

## 2017-05-03 NOTE — PROGRESS NOTES
HISTORY OF PRESENT ILLNESS:  I had the opportunity to see Mr. Leonides Paul in Cardiology Clinic today at the [facility] in [place] for reevaluation of coronary artery disease with chronic angina.  As you know, Leonides has a chronically occluded mid LAD with good distal collaterals.  He has been experiencing occasional mild exertional angina for more than 10 years, but we have managed this with medications and it has not affected his lifestyle.  He indicates that he still gets some angina when he goes out for his 3-mile walk.  Typically it occurs during the first mile and is brief and resolves fairly promptly and then he is able to walk for a couple more miles without any discomfort whatsoever.  He believes that over the last 5-10 years, the discomfort may have gotten slowly and slightly worse and he does believe it is perhaps a bit worse than it was a year ago, but he is not bothered by it enough at this point to indicate that he needs any additional treatment.      Currently, he has been bothered by some severe insomnia issues.  He saw you to discuss that recently.      PHYSICAL EXAMINATION:  Today his blood pressure was 118/82, heart rate 52 and weight 210 pounds.  His lungs are clear.  His heart rhythm is regular.  He has no cardiac murmurs or carotid bruits.      His cholesterol numbers remain excellent.  He has normal renal function.      IMPRESSIONS:  Mr. Leonides Paul is a 56-year-old gentleman with chronic ischemic coronary artery disease with a chronically occluded mid LAD which good collaterals to the distal LAD.  He has some persistent exertional angina but still quite mild.  I offered him some additional medical therapy to help decrease those symptoms such as long-acting nitrate or Ranexa.  At this time, he declines additional medication and wishes to continue his current medication program.  I encouraged him to contact us if he decides that his chest discomfort symptoms are  bothersome enough to require additional treatment.      I printed off some information for him from Up To Date about insomnia.  I will have him come back to discuss those issues with you further if he continues to be bothered by that problem.      I will plan to see him back again in 1 year.      cc:      Gregory [place] [address]         MENDOZA BATES MD, [facility]             D: 2017 16:04   T: 2017 10:59   MT: MERLYN      Name:     JIM FERNANDEZ   MRN:      [id number]        Account:      [id number]   :      1960           Service Date: 2017      Document: [id number]

## 2017-05-04 LAB
HCV RNA SERPL NAA+PROBE-ACNC: NORMAL [IU]/ML
HCV RNA SERPL NAA+PROBE-LOG IU: NORMAL LOG IU/ML

## 2017-06-01 DIAGNOSIS — I10 BENIGN ESSENTIAL HYPERTENSION: ICD-10-CM

## 2017-06-01 RX ORDER — AMLODIPINE BESYLATE 5 MG/1
5 TABLET ORAL DAILY
Qty: 90 TABLET | Refills: 3 | Status: SHIPPED | OUTPATIENT
Start: 2017-06-01 | End: 2018-10-01

## 2017-06-10 DIAGNOSIS — I25.10 CORONARY ARTERY DISEASE INVOLVING NATIVE CORONARY ARTERY OF NATIVE HEART WITHOUT ANGINA PECTORIS: ICD-10-CM

## 2017-06-10 DIAGNOSIS — Z51.81 ENCOUNTER FOR THERAPEUTIC DRUG MONITORING: Primary | ICD-10-CM

## 2017-06-10 LAB
ALBUMIN SERPL-MCNC: 3.7 G/DL (ref 3.4–5)
ALP SERPL-CCNC: 76 U/L (ref 40–150)
ALT SERPL W P-5'-P-CCNC: 44 U/L (ref 0–70)
ANION GAP SERPL CALCULATED.3IONS-SCNC: 10 MMOL/L (ref 3–14)
AST SERPL W P-5'-P-CCNC: 20 U/L (ref 0–45)
BASOPHILS # BLD AUTO: 0 10E9/L (ref 0–0.2)
BASOPHILS NFR BLD AUTO: 0 %
BILIRUB SERPL-MCNC: 0.5 MG/DL (ref 0.2–1.3)
BUN SERPL-MCNC: 16 MG/DL (ref 7–30)
CALCIUM SERPL-MCNC: 8.9 MG/DL (ref 8.5–10.1)
CHLORIDE SERPL-SCNC: 109 MMOL/L (ref 94–109)
CHOLEST SERPL-MCNC: 135 MG/DL
CO2 SERPL-SCNC: 27 MMOL/L (ref 20–32)
CREAT SERPL-MCNC: 0.93 MG/DL (ref 0.66–1.25)
DIFFERENTIAL METHOD BLD: NORMAL
EOSINOPHIL # BLD AUTO: 0.1 10E9/L (ref 0–0.7)
EOSINOPHIL NFR BLD AUTO: 2.4 %
ERYTHROCYTE [DISTWIDTH] IN BLOOD BY AUTOMATED COUNT: 13.2 % (ref 10–15)
GFR SERPL CREATININE-BSD FRML MDRD: 83 ML/MIN/1.7M2
GLUCOSE SERPL-MCNC: 97 MG/DL (ref 70–99)
HCT VFR BLD AUTO: 43 % (ref 40–53)
HDLC SERPL-MCNC: 34 MG/DL
HGB BLD-MCNC: 14.9 G/DL (ref 13.3–17.7)
IMM GRANULOCYTES # BLD: 0 10E9/L (ref 0–0.4)
IMM GRANULOCYTES NFR BLD: 0 %
LDLC SERPL CALC-MCNC: 70 MG/DL
LYMPHOCYTES # BLD AUTO: 2.7 10E9/L (ref 0.8–5.3)
LYMPHOCYTES NFR BLD AUTO: 51.2 %
MCH RBC QN AUTO: 31.5 PG (ref 26.5–33)
MCHC RBC AUTO-ENTMCNC: 34.7 G/DL (ref 31.5–36.5)
MCV RBC AUTO: 91 FL (ref 78–100)
MONOCYTES # BLD AUTO: 0.4 10E9/L (ref 0–1.3)
MONOCYTES NFR BLD AUTO: 8.3 %
NEUTROPHILS # BLD AUTO: 2 10E9/L (ref 1.6–8.3)
NEUTROPHILS NFR BLD AUTO: 38.1 %
NONHDLC SERPL-MCNC: 101 MG/DL
PLATELET # BLD AUTO: 218 10E9/L (ref 150–450)
POTASSIUM SERPL-SCNC: 4 MMOL/L (ref 3.4–5.3)
PROT SERPL-MCNC: 7 G/DL (ref 6.8–8.8)
RBC # BLD AUTO: 4.73 10E12/L (ref 4.4–5.9)
SODIUM SERPL-SCNC: 146 MMOL/L (ref 133–144)
TRIGL SERPL-MCNC: 157 MG/DL
WBC # BLD AUTO: 5.3 10E9/L (ref 4–11)

## 2017-06-10 PROCEDURE — 86480 TB TEST CELL IMMUN MEASURE: CPT | Performed by: PHYSICIAN ASSISTANT

## 2017-06-10 PROCEDURE — 80061 LIPID PANEL: CPT | Performed by: PHYSICIAN ASSISTANT

## 2017-06-10 PROCEDURE — 80053 COMPREHEN METABOLIC PANEL: CPT | Performed by: PHYSICIAN ASSISTANT

## 2017-06-10 PROCEDURE — 85025 COMPLETE CBC W/AUTO DIFF WBC: CPT | Performed by: PHYSICIAN ASSISTANT

## 2017-06-10 PROCEDURE — 36415 COLL VENOUS BLD VENIPUNCTURE: CPT | Performed by: PHYSICIAN ASSISTANT

## 2017-06-12 ENCOUNTER — TELEPHONE (OUTPATIENT)
Dept: CARDIOLOGY | Facility: CLINIC | Age: 57
End: 2017-06-12

## 2017-06-12 LAB
M TB TUBERC IFN-G BLD QL: NEGATIVE
M TB TUBERC IFN-G/MITOGEN IGNF BLD: 0.06 IU/ML

## 2017-06-12 NOTE — TELEPHONE ENCOUNTER
Patient phone results of lipid panel done this morning on Lipitor 80 MG. His LDL is at target at 70. He is tolerating well. LDL was 57 a month ago. He saw Dr. Hernandez in early may and will return in one year. He had no questions for me. I will mail copy of report to him. Keshia

## 2017-06-26 DIAGNOSIS — I25.10 CORONARY ARTERY DISEASE INVOLVING NATIVE CORONARY ARTERY OF NATIVE HEART WITHOUT ANGINA PECTORIS: ICD-10-CM

## 2017-06-26 RX ORDER — CLOPIDOGREL BISULFATE 75 MG/1
75 TABLET ORAL DAILY
Qty: 90 TABLET | Refills: 3 | Status: SHIPPED | OUTPATIENT
Start: 2017-06-26 | End: 2018-07-13

## 2017-06-26 RX ORDER — METOPROLOL SUCCINATE 50 MG/1
50 TABLET, EXTENDED RELEASE ORAL DAILY
Qty: 90 TABLET | Refills: 3 | Status: SHIPPED | OUTPATIENT
Start: 2017-06-26 | End: 2018-07-13

## 2017-06-30 ENCOUNTER — TELEPHONE (OUTPATIENT)
Dept: FAMILY MEDICINE | Facility: CLINIC | Age: 57
End: 2017-06-30

## 2017-06-30 DIAGNOSIS — N52.2 DRUG-INDUCED ERECTILE DYSFUNCTION: ICD-10-CM

## 2017-06-30 NOTE — TELEPHONE ENCOUNTER
Reason for Call:  Medication or medication refill:    Do you use a [facility]?  Name of the pharmacy and phone number for the current request:  [facility] [place], [place] - [address] AT PORTAL TO REGISTERED [facility] SITES    Name of the medication requested: CIALIS 10mg     Other request: Patient received a letter from pharmacy stating they haven't heard anything back from Dr. Schoen in regards to this refill request and to call us to have us put in the request. The directions on the letter state that Dr. Schoen is to call [phone] to refill the prescription. It also states the patient is supposed to provide his mailing address and his RX number on his pharmacy card. His mailing address is [address] [place] [address] and the RX number on the card is [id number]. If you have questions please call the patient at the number provided below. Please advise.     Can we leave a detailed message on this number? YES    Phone number patient can be reached at: Home number on file [phone] (home)    Best Time: any     Call taken on 6/30/2017 at 4:43 PM by Jeremi Rausch

## 2017-07-03 RX ORDER — TADALAFIL 10 MG/1
TABLET ORAL
Qty: 12 TABLET | Refills: 0 | Status: SHIPPED | OUTPATIENT
Start: 2017-07-03 | End: 2017-07-06

## 2017-07-06 RX ORDER — TADALAFIL 10 MG/1
10 TABLET ORAL DAILY PRN
Qty: 12 TABLET | Refills: 0 | Status: SHIPPED | OUTPATIENT
Start: 2017-07-06 | End: 2017-09-04

## 2017-07-06 NOTE — TELEPHONE ENCOUNTER
[facility] pharmacy called asking for directions on how to take this medication. Pharmacy states the directions that were attached are incorrect.  REFERENCE # [id number]    Thank you,   Sasha Ruggiero   for [facility]

## 2017-07-06 NOTE — TELEPHONE ENCOUNTER
[facility] with standard orders for erectile dysfunction.    Electronically signed by Greg Schoen, MD

## 2017-09-04 DIAGNOSIS — N52.2 DRUG-INDUCED ERECTILE DYSFUNCTION: ICD-10-CM

## 2017-09-05 NOTE — TELEPHONE ENCOUNTER
CIALIS       Last Written Prescription Date:  7/6/17  Last Fill Quantity: 12,   # refills: 0  Last Office Visit with [facility], [facility] or [facility] prescribing provider: 4/19/17  Future Office visit:       Routing refill request to provider for review/approval because:  Drug not on the [facility], [facility] or [facility] refill protocol or controlled substance  Kari Rogers MA 9/5/2017

## 2017-09-06 RX ORDER — TADALAFIL 10 MG
TABLET ORAL
Qty: 12 TABLET | Refills: 0 | Status: SHIPPED | OUTPATIENT
Start: 2017-09-06 | End: 2017-12-10

## 2017-09-11 DIAGNOSIS — E78.5 HYPERLIPIDEMIA LDL GOAL <70: ICD-10-CM

## 2017-09-11 RX ORDER — EZETIMIBE 10 MG/1
10 TABLET ORAL DAILY
Qty: 90 TABLET | Refills: 2 | Status: SHIPPED | OUTPATIENT
Start: 2017-09-11 | End: 2018-05-21

## 2017-10-11 DIAGNOSIS — E78.5 HYPERLIPIDEMIA LDL GOAL <70: ICD-10-CM

## 2017-10-11 RX ORDER — ATORVASTATIN CALCIUM 80 MG/1
80 TABLET, FILM COATED ORAL DAILY
Qty: 90 TABLET | Refills: 2 | Status: SHIPPED | OUTPATIENT
Start: 2017-10-11 | End: 2018-10-02

## 2017-11-15 ENCOUNTER — ALLIED HEALTH/NURSE VISIT (OUTPATIENT)
Dept: FAMILY MEDICINE | Facility: CLINIC | Age: 57
End: 2017-11-15
Payer: COMMERCIAL

## 2017-11-15 DIAGNOSIS — L40.0 PSORIASIS VULGARIS: Primary | ICD-10-CM

## 2017-11-15 DIAGNOSIS — Z23 NEED FOR PROPHYLACTIC VACCINATION AND INOCULATION AGAINST INFLUENZA: Primary | ICD-10-CM

## 2017-11-15 LAB
ANION GAP SERPL CALCULATED.3IONS-SCNC: 6 MMOL/L (ref 3–14)
BASOPHILS # BLD AUTO: 0 10E9/L (ref 0–0.2)
BASOPHILS NFR BLD AUTO: 0.2 %
BUN SERPL-MCNC: 15 MG/DL (ref 7–30)
CALCIUM SERPL-MCNC: 8.6 MG/DL (ref 8.5–10.1)
CHLORIDE SERPL-SCNC: 105 MMOL/L (ref 94–109)
CO2 SERPL-SCNC: 30 MMOL/L (ref 20–32)
CREAT SERPL-MCNC: 0.97 MG/DL (ref 0.66–1.25)
DIFFERENTIAL METHOD BLD: NORMAL
EOSINOPHIL # BLD AUTO: 0.3 10E9/L (ref 0–0.7)
EOSINOPHIL NFR BLD AUTO: 4.3 %
ERYTHROCYTE [DISTWIDTH] IN BLOOD BY AUTOMATED COUNT: 12.9 % (ref 10–15)
GFR SERPL CREATININE-BSD FRML MDRD: 79 ML/MIN/1.7M2
GLUCOSE SERPL-MCNC: 121 MG/DL (ref 70–99)
HCT VFR BLD AUTO: 44 % (ref 40–53)
HGB BLD-MCNC: 15.2 G/DL (ref 13.3–17.7)
IMM GRANULOCYTES # BLD: 0 10E9/L (ref 0–0.4)
IMM GRANULOCYTES NFR BLD: 0.2 %
LYMPHOCYTES # BLD AUTO: 2.7 10E9/L (ref 0.8–5.3)
LYMPHOCYTES NFR BLD AUTO: 44.5 %
MCH RBC QN AUTO: 31.3 PG (ref 26.5–33)
MCHC RBC AUTO-ENTMCNC: 34.5 G/DL (ref 31.5–36.5)
MCV RBC AUTO: 91 FL (ref 78–100)
MONOCYTES # BLD AUTO: 0.4 10E9/L (ref 0–1.3)
MONOCYTES NFR BLD AUTO: 6.3 %
NEUTROPHILS # BLD AUTO: 2.7 10E9/L (ref 1.6–8.3)
NEUTROPHILS NFR BLD AUTO: 44.5 %
PLATELET # BLD AUTO: 188 10E9/L (ref 150–450)
POTASSIUM SERPL-SCNC: 3.9 MMOL/L (ref 3.4–5.3)
RBC # BLD AUTO: 4.86 10E12/L (ref 4.4–5.9)
SODIUM SERPL-SCNC: 141 MMOL/L (ref 133–144)
WBC # BLD AUTO: 6 10E9/L (ref 4–11)

## 2017-11-15 PROCEDURE — 36415 COLL VENOUS BLD VENIPUNCTURE: CPT | Performed by: PHYSICIAN ASSISTANT

## 2017-11-15 PROCEDURE — 99207 ZZC NO CHARGE NURSE ONLY: CPT

## 2017-11-15 PROCEDURE — 90686 IIV4 VACC NO PRSV 0.5 ML IM: CPT

## 2017-11-15 PROCEDURE — 80048 BASIC METABOLIC PNL TOTAL CA: CPT | Performed by: PHYSICIAN ASSISTANT

## 2017-11-15 PROCEDURE — 90471 IMMUNIZATION ADMIN: CPT

## 2017-11-15 PROCEDURE — 85025 COMPLETE CBC W/AUTO DIFF WBC: CPT | Performed by: PHYSICIAN ASSISTANT

## 2017-11-15 NOTE — MR AVS SNAPSHOT
"              After Visit Summary   11/15/2017    Leonides Paul    MRN: [id number]           Patient Information     Date Of Birth          1960        Visit Information        Provider Department      11/15/2017 1:00 PM NL FLOAT TEAM D [facility]        Today's Diagnoses     Need for prophylactic vaccination and inoculation against influenza    -  1       Follow-ups after your visit        Your next 10 appointments already scheduled     Nov 15, 2017  1:15 PM CST   LAB with NL LAB [facility] ([facility])    [address] [place] [address] [phone]           Please do not eat 10-12 hours before your appointment if you are coming in fasting for labs on lipids, cholesterol, or glucose (sugar). This does not apply to pregnant women. Water, hot tea and black coffee (with nothing added) are okay. Do not drink other fluids, diet soda or chew gum.              Who to contact     If you have questions or need follow up information about today's clinic visit or your schedule please contact [facility] directly at [phone].  Normal or non-critical lab and imaging results will be communicated to you by "[facility]"hart, letter or phone within 4 business days after the clinic has received the results. If you do not hear from us within 7 days, please contact the clinic through [email or web address] or phone. If you have a critical or abnormal lab result, we will notify you by phone as soon as possible.  Submit refill requests through SideStep or call your pharmacy and they will forward the refill request to us. Please allow 3 business days for your refill to be completed.          Additional Information About Your Visit        MyChart Information     SideStep lets you send messages to your doctor, view your test results, renew your prescriptions, schedule appointments and more. To sign up, go to [email or web address] . Click on \"Log in\" on the left " "side of the screen, which will take you to the Welcome page. Then click on \"Sign up Now\" on the right side of the page.     You will be asked to enter the access code listed below, as well as some personal information. Please follow the directions to create your username and password.     Your access code is: [email or web address]  Expires: 2018  1:04 PM     Your access code will  in 90 days. If you need help or a new code, please call your [place] clinic or [phone].        Care EveryWhere ID     This is your Care EveryWhere ID. This could be used by other organizations to access your [place] medical records  [id number]         Blood Pressure from Last 3 Encounters:   17 118/82   17 110/58   16 114/78    Weight from Last 3 Encounters:   17 210 lb 3.2 oz (95.3 kg)   17 209 lb (94.8 kg)   16 211 lb 8 oz (95.9 kg)              We Performed the Following     FLU VAC, SPLIT VIRUS IM > 3 YO (QUADRIVALENT) [96702]     Vaccine Administration, Initial [97165]        Primary Care Provider Office Phone # Fax #    Gregory G Schoen, [phone]       [address] [place] [address]        Equal Access to Services     [facility] : Hadii chuy martínez hadasho Sohalima, waaxda luqadaha, qaybta kaalmada saulo, kwasi naqvi. So [facility] [phone].    ATENCIÓN: Si habla español, tiene a pierce disposición servicios gratuitos de asistencia lingüística. Mendoza al [phone].    We comply with applicable federal civil rights laws and [place] laws. We do not discriminate on the basis of race, color, national origin, age, disability, sex, sexual orientation, or gender identity.            Thank you!     Thank you for choosing [facility]  for your care. Our goal is always to provide you with excellent care. Hearing back from our patients is one way we can continue to improve our services. Please take a few minutes to complete " the written survey that you may receive in the mail after your visit with us. Thank you!             Your Updated Medication List - Protect others around you: Learn how to safely use, store and throw away your medicines at www.disposemymeds.org.          This list is accurate as of: 11/15/17  1:04 PM.  Always use your most recent med list.                   Brand Name Dispense Instructions for use Diagnosis    * ACE/ARB/ARNI NOT PRESCRIBED (INTENTIONAL)      by Other route continuous prn.        amLODIPine 5 MG tablet    NORVASC    90 tablet    Take 1 tablet (5 mg) by mouth daily    Benign essential hypertension       * ASPIRIN NOT PRESCRIBED    INTENTIONAL     by Other route continuous prn.    CAD (coronary artery disease)       atorvastatin 80 MG tablet    LIPITOR    90 tablet    Take 1 tablet (80 mg) by mouth daily    Hyperlipidemia LDL goal <70       CIALIS 10 MG tablet   Generic drug:  tadalafil     12 tablet    TAKE 1 TABLET DAILY AS     NEEDED FOR ERECTILE        DYSFUNCTION    Drug-induced erectile dysfunction       clopidogrel 75 MG tablet    PLAVIX    90 tablet    Take 1 tablet (75 mg) by mouth daily    Coronary artery disease involving native coronary artery of native heart without angina pectoris       ezetimibe 10 MG tablet    ZETIA    90 tablet    Take 1 tablet (10 mg) by mouth daily    Hyperlipidemia LDL goal <70       MELATONIN PO      Take by mouth At Bedtime        metoprolol 50 MG 24 hr tablet    TOPROL XL    90 tablet    Take 1 tablet (50 mg) by mouth daily    Coronary artery disease involving native coronary artery of native heart without angina pectoris       triamcinolone 0.5 % cream    KENALOG    30 g    Apply to affected areas twice daily    Other psoriasis       UNABLE TO FIND      MEDICATION NAME: Taltz injection 70mg once per month        * Notice:  This list has 2 medication(s) that are the same as other medications prescribed for you. Read the directions carefully, and ask your doctor  or other care provider to review them with you.

## 2017-11-15 NOTE — NURSING NOTE
Chief Complaint   Patient presents with     Imm/Inj     flu shot     Prior to injection verified patient identity using patient's name and date of birth.  Kari Palacios MA 11/15/2017

## 2017-11-15 NOTE — PROGRESS NOTES

## 2017-12-10 DIAGNOSIS — N52.2 DRUG-INDUCED ERECTILE DYSFUNCTION: ICD-10-CM

## 2017-12-11 RX ORDER — TADALAFIL 10 MG
TABLET ORAL
Qty: 12 TABLET | Refills: 0 | Status: SHIPPED | OUTPATIENT
Start: 2017-12-11 | End: 2018-02-28

## 2017-12-11 NOTE — TELEPHONE ENCOUNTER
LOV 04/19/2017.    Prescription approved per [facility] Refill Protocol.    Didi Solorio RN

## 2017-12-11 NOTE — TELEPHONE ENCOUNTER
Requested Prescriptions   Pending Prescriptions Disp Refills     CIALIS 10 MG tablet [Pharmacy Med Name: CIALIS TAB 10MG] 12 tablet 0     Sig: TAKE 1 TABLET DAILY AS     NEEDED FOR ERECTILE        DYSFUNCTION    Erectile Dysfuction Protocol Passed    12/10/2017 12:07 PM       Passed - Absence of nitrates on medication list       Passed - Absence of Alpha Blockers on Med list       Passed - Recent or future visit with authorizing provider    Patient had office visit in the last year or has a visit in the next 30 days with authorizing provider.  See chart review.              Passed - Patient is age 18 or older

## 2018-02-28 ENCOUNTER — TELEPHONE (OUTPATIENT)
Dept: FAMILY MEDICINE | Facility: CLINIC | Age: 58
End: 2018-02-28

## 2018-02-28 DIAGNOSIS — N52.2 DRUG-INDUCED ERECTILE DYSFUNCTION: ICD-10-CM

## 2018-02-28 NOTE — TELEPHONE ENCOUNTER
Patient is calling to report he believes he had Influenza over the weekend.  He had fever, headache, body aches and fatigue.  He is reporting he feels fine, but for the past 2 days he has had facial swelling with slight tenderness to the touch.  Patient states he still has a slight fever around 100, but has no other symptoms and feels fine.      Patient is denying the following: loss of vision or any change in vision, severe pain, facial drooping, redness, warmth or drainage.    Patient is informed he should take a Benadryl every 6 hours and use ice to the area to see if he sees an improvement.  He is also informed a message will be sent to PCP to see if additional treatment is needed for this.  He is aware PCP is not in clinic until later in the day.    Routing to PCP for further advice.    Jamee Carter RN    Telephone Triage Protocols for Nurses, 5th edition - Katy Cartagena: Facial Problems

## 2018-03-01 ENCOUNTER — OFFICE VISIT (OUTPATIENT)
Dept: FAMILY MEDICINE | Facility: OTHER | Age: 58
End: 2018-03-01
Payer: COMMERCIAL

## 2018-03-01 VITALS
HEIGHT: 72 IN | RESPIRATION RATE: 16 BRPM | SYSTOLIC BLOOD PRESSURE: 124 MMHG | WEIGHT: 211 LBS | BODY MASS INDEX: 28.58 KG/M2 | DIASTOLIC BLOOD PRESSURE: 66 MMHG | TEMPERATURE: 98.1 F | HEART RATE: 56 BPM

## 2018-03-01 DIAGNOSIS — L03.213 PERIORBITAL CELLULITIS OF RIGHT EYE: Primary | ICD-10-CM

## 2018-03-01 DIAGNOSIS — Z86.79 HISTORY OF ANGINA PECTORIS: ICD-10-CM

## 2018-03-01 PROCEDURE — 99213 OFFICE O/P EST LOW 20 MIN: CPT | Performed by: PHYSICIAN ASSISTANT

## 2018-03-01 RX ORDER — TADALAFIL 10 MG
TABLET ORAL
Qty: 12 TABLET | Refills: 0 | Status: SHIPPED | OUTPATIENT
Start: 2018-03-01 | End: 2018-05-09

## 2018-03-01 RX ORDER — CEPHALEXIN 500 MG/1
500 CAPSULE ORAL 3 TIMES DAILY
Qty: 30 CAPSULE | Refills: 0 | Status: SHIPPED | OUTPATIENT
Start: 2018-03-01 | End: 2018-05-08

## 2018-03-01 ASSESSMENT — ANXIETY QUESTIONNAIRES
4. TROUBLE RELAXING: NOT AT ALL
GAD7 TOTAL SCORE: 0
7. FEELING AFRAID AS IF SOMETHING AWFUL MIGHT HAPPEN: NOT AT ALL
5. BEING SO RESTLESS THAT IT IS HARD TO SIT STILL: NOT AT ALL
3. WORRYING TOO MUCH ABOUT DIFFERENT THINGS: NOT AT ALL
GAD7 TOTAL SCORE: 0
7. FEELING AFRAID AS IF SOMETHING AWFUL MIGHT HAPPEN: NOT AT ALL
GAD7 TOTAL SCORE: 0
6. BECOMING EASILY ANNOYED OR IRRITABLE: NOT AT ALL
1. FEELING NERVOUS, ANXIOUS, OR ON EDGE: NOT AT ALL
2. NOT BEING ABLE TO STOP OR CONTROL WORRYING: NOT AT ALL

## 2018-03-01 ASSESSMENT — PATIENT HEALTH QUESTIONNAIRE - PHQ9
SUM OF ALL RESPONSES TO PHQ QUESTIONS 1-9: 1
SUM OF ALL RESPONSES TO PHQ QUESTIONS 1-9: 1
10. IF YOU CHECKED OFF ANY PROBLEMS, HOW DIFFICULT HAVE THESE PROBLEMS MADE IT FOR YOU TO DO YOUR WORK, TAKE CARE OF THINGS AT HOME, OR GET ALONG WITH OTHER PEOPLE: NOT DIFFICULT AT ALL

## 2018-03-01 ASSESSMENT — PAIN SCALES - GENERAL: PAINLEVEL: MILD PAIN (2)

## 2018-03-01 NOTE — TELEPHONE ENCOUNTER
I support the recommendations given and would add if this is still present by next week or worsens, he should be seen to further evaluate.  Electronically signed by Greg Schoen, MD

## 2018-03-01 NOTE — TELEPHONE ENCOUNTER
"Requested Prescriptions   Pending Prescriptions Disp Refills     CIALIS 10 MG tablet [Pharmacy Med Name: CIALIS TAB 10MG] 12 tablet 0     Sig: TAKE 1 TABLET DAILY AS     NEEDED FOR ERECTILE        DYSFUNCTION    Erectile Dysfuction Protocol Passed    2/28/2018  6:10 PM       Passed - Absence of nitrates on medication list       Passed - Absence of Alpha Blockers on Med list       Passed - Recent or future visit with authorizing provider    Patient had office visit in the last year or has a visit in the next 30 days with authorizing provider.  See \"Patient Info\" tab in inbasket, or \"Choose Columns\" in Meds & Orders section of the refill encounter.            Passed - Patient is age 18 or older        Last Written Prescription Date:  12/11/17  Last Fill Quantity: 12,  # refills: 0   Last office visit: 11/15/2017 with prescribing provider:     Future Office Visit:   Next 5 appointments (look out 90 days)     Mar 01, 2018  3:40 PM CST   [facility] Glenn Deluca PA-C   [facility]    [address] [address] [phone]                   "

## 2018-03-01 NOTE — PROGRESS NOTES
SUBJECTIVE:   Leonides Paul is a 57 year old male who presents to clinic today for the following health issues:    Bilateral periorbital swelling is noted.  The right side is much more involved than the left.  There is erythema to the forehead bilaterally as well crossing over the midline to both temple regions.  He has a history of psoriasis and this is making the psoriasis much more itchy.  He denies any pain or tingling to this region which is reassuring that this is most likely not shingles.  His grimace and eye closure is equal bilaterally decreasing the risk for Bell's palsy.    HPI  Acute Illness   Acute illness concerns: URI and Facial Swelling  Onset: 2/23/2018    Fever: YES    Chills/Sweats: YES    Headache (location?): YES    Sinus Pressure:no    Conjunctivitis:  no    Ear Pain: no    Rhinorrhea: no    Congestion: no    Sore Throat: no     Cough: YES    Wheeze: no    Decreased Appetite: no    Nausea: YES    Vomiting: YES    Diarrhea:  no    Dysuria/Freq.: no    Fatigue/Achiness: YES    Sick/Strep Exposure: no     Therapies Tried and outcome: Tylenol, Benadryl, Ice     Swelling got worse after putting ice on it.     Problem list and histories reviewed & adjusted, as indicated.  Additional history: as documented    Patient Active Problem List   Diagnosis     Other and unspecified angina pectoris     Other psoriasis     CAD (coronary artery disease)     Hyperlipidemia with target LDL less than 100     Drug-induced erectile dysfunction     Past Surgical History:   Procedure Laterality Date     COLONOSCOPY  02/10/10     HC LAPAROSCOPY, SURGICAL; APPENDECTOMY  7/17/10     REMOVAL OF SPERM DUCT(S)         Social History   Substance Use Topics     Smoking status: Former Smoker     Packs/day: 2.00     Years: 12.00     Smokeless tobacco: Never Used      Comment: Quit at age 25     Alcohol use Yes      Comment: beer weekly     Family History   Problem Relation Age of Onset     Allergies Daughter       environment al     Allergies Son      same as sister     Alzheimer Disease Father       at age 73     Blood Disease Father      CANCER Maternal Grandfather      [place] lung -     HEART DISEASE Maternal Grandmother      Psychotic Disorder Brother          Current Outpatient Prescriptions   Medication Sig Dispense Refill     cephALEXin (KEFLEX) 500 MG capsule Take 1 capsule (500 mg) by mouth 3 times daily 30 capsule 0     atorvastatin (LIPITOR) 80 MG tablet Take 1 tablet (80 mg) by mouth daily 90 tablet 2     ezetimibe (ZETIA) 10 MG tablet Take 1 tablet (10 mg) by mouth daily 90 tablet 2     metoprolol (TOPROL XL) 50 MG 24 hr tablet Take 1 tablet (50 mg) by mouth daily 90 tablet 3     clopidogrel (PLAVIX) 75 MG tablet Take 1 tablet (75 mg) by mouth daily 90 tablet 3     amLODIPine (NORVASC) 5 MG tablet Take 1 tablet (5 mg) by mouth daily 90 tablet 3     UNABLE TO FIND MEDICATION NAME: Taltz injection 70mg once per month       MELATONIN PO Take by mouth At Bedtime       triamcinolone (KENALOG) 0.5 % cream Apply to affected areas twice daily 30 g 2     ASPIRIN NOT PRESCRIBED, INTENTIONAL, by Other route continuous prn.  0     ACE/ARB NOT PRESCRIBED, INTENTIONAL, by Other route continuous prn.       CIALIS 10 MG tablet TAKE 1 TABLET DAILY AS     NEEDED FOR ERECTILE        DYSFUNCTION 12 tablet 0     Allergies   Allergen Reactions     No Known Drug Allergies      Recent Labs   Lab Test  11/15/17   1241  06/10/17   1601  17   0728  16   0814  16   0810   LDL   --   70  57   --   65   HDL   --   34*  39*   --   32*   TRIG   --   157*  59   --   76   ALT   --   44  48  52   --    CR  0.97  0.93  0.82   --   0.85   GFRESTIMATED  79  83  >90  Non  GFR Calc     --   >90  Non  GFR Calc     GFRESTBLACK  >90  >90  African American GFR Calc    >90   GFR Calc     --   >90   GFR Calc     POTASSIUM  3.9  4.0   --    --   4.1      BP Readings  "from Last 3 Encounters:   03/01/18 124/66   05/02/17 118/82   04/19/17 110/58    Wt Readings from Last 3 Encounters:   03/01/18 211 lb (95.7 kg)   05/02/17 210 lb 3.2 oz (95.3 kg)   04/19/17 209 lb (94.8 kg)                  Labs reviewed in EPIC    ROS:  Constitutional, HEENT, cardiovascular, pulmonary, gi and gu systems are negative, except as otherwise noted.    OBJECTIVE:     /66  Pulse 56  Temp 98.1  F (36.7  C) (Temporal)  Resp 16  Ht 6' 0.05\" (1.83 m)  Wt 211 lb (95.7 kg)  BMI 28.58 kg/m2  Body mass index is 28.58 kg/(m^2).  GENERAL: healthy, alert and no distress  EYES: PERRL, EOMI and eyelids- periorbital edema OD  HENT: normal cephalic/atraumatic, ear canals and TM's normal, nose and mouth without ulcers or lesions, oropharynx clear and oral mucous membranes moist  NECK: no adenopathy, no asymmetry, masses, or scars and trachea midline and normal to palpation  RESP: lungs clear to auscultation - no rales, rhonchi or wheezes  CV: regular rate and rhythm, normal S1 S2, no S3 or S4, no murmur, click or rub, no peripheral edema and peripheral pulses strong  ABDOMEN: soft, nontender, no hepatosplenomegaly, no masses and bowel sounds normal  MS: no gross musculoskeletal defects noted, no edema  NEURO: Normal strength and tone, mentation intact and speech normal  PSYCH: mentation appears normal, affect normal/bright    Diagnostic Test Results:  No results found for this or any previous visit (from the past 24 hour(s)).    ASSESSMENT/PLAN:     1. Periorbital cellulitis of right eye  Moderate nature he is to  the medications as listed below and take them as directed.  Signs and symptoms of recovery as well as side effects of medications are discussed today.  Should he have any side effects medications he needs to call for alternative medication.  Have advised that he be rechecked in about 2 weeks to assure resolution.  - cephALEXin (KEFLEX) 500 MG capsule; Take 1 capsule (500 mg) by mouth 3 times " daily  Dispense: 30 capsule; Refill: 0    2. History of angina pectoris  He continues to follow with cardiology for this no new concerns are reported today.    Follow-up in a couple weeks is advised.    Glenn Franco PA-C  [facility]

## 2018-03-01 NOTE — MR AVS SNAPSHOT
"              After Visit Summary   3/1/2018    Leonides Paul    MRN: [id number]           Patient Information     Date Of Birth          1960        Visit Information        Provider Department      3/1/2018 3:40 PM Glenn Deluca PA-C [facility]        Today's Diagnoses     Periorbital cellulitis of right eye    -  1       Follow-ups after your visit        Who to contact     If you have questions or need follow up information about today's clinic visit or your schedule please contact [facility] directly at [phone].  Normal or non-critical lab and imaging results will be communicated to you by Netstoryhart, letter or phone within 4 business days after the clinic has received the results. If you do not hear from us within 7 days, please contact the clinic through Netstoryhart or phone. If you have a critical or abnormal lab result, we will notify you by phone as soon as possible.  Submit refill requests through Gynzy or call your pharmacy and they will forward the refill request to us. Please allow 3 business days for your refill to be completed.          Additional Information About Your Visit        MyChart Information     Gynzy lets you send messages to your doctor, view your test results, renew your prescriptions, schedule appointments and more. To sign up, go to [email or web address] . Click on \"Log in\" on the left side of the screen, which will take you to the Welcome page. Then click on \"Sign up Now\" on the right side of the page.     You will be asked to enter the access code listed below, as well as some personal information. Please follow the directions to create your username and password.     Your access code is: [email or web address]  Expires: 2018  3:59 PM     Your access code will  in 90 days. If you need help or a new code, please call your [facility] or [phone].        Care EveryWhere ID     This is your Care EveryWhere ID. This could be used by " "other organizations to access your [place] medical records  [id number]        Your Vitals Were     Pulse Temperature Respirations Height BMI (Body Mass Index)       56 98.1  F (36.7  C) (Temporal) 16 6' 0.05\" (1.83 m) 28.58 kg/m2        Blood Pressure from Last 3 Encounters:   03/01/18 124/66   05/02/17 118/82   04/19/17 110/58    Weight from Last 3 Encounters:   03/01/18 211 lb (95.7 kg)   05/02/17 210 lb 3.2 oz (95.3 kg)   04/19/17 209 lb (94.8 kg)              Today, you had the following     No orders found for display         Today's Medication Changes          These changes are accurate as of 3/1/18  3:59 PM.  If you have any questions, ask your nurse or doctor.               Start taking these medicines.        Dose/Directions    cephALEXin 500 MG capsule   Commonly known as:  KEFLEX   Used for:  Periorbital cellulitis of right eye   Started by:  Glenn Deluca PA-C        Dose:  500 mg   Take 1 capsule (500 mg) by mouth 3 times daily   Quantity:  30 capsule   Refills:  0            Where to get your medicines      These medications were sent to [facility] - [address]   [address] [place] [address]     Phone:  [phone]     cephALEXin 500 MG capsule                Primary Care Provider Office Phone # Fax #    Gregory G Schoen, [phone]       [address] [place] [address]        Equal Access to Services     [facility] AH: Hadii chuy martínez hadasho Soesperanzaali, waaxda luqadaha, qaybta kaalmada adeegyada, kwasi naqvi. So [facility] [phone].    ATENCIÓN: Si habla español, tiene a pierce disposición servicios gratuitos de asistencia lingüística. Llame al [phone].    We comply with applicable federal civil rights laws and [place] laws. We do not discriminate on the basis of race, color, national origin, age, disability, sex, sexual orientation, or gender identity.            Thank you!     Thank you for " choosing [facility]  for your care. Our goal is always to provide you with excellent care. Hearing back from our patients is one way we can continue to improve our services. Please take a few minutes to complete the written survey that you may receive in the mail after your visit with us. Thank you!             Your Updated Medication List - Protect others around you: Learn how to safely use, store and throw away your medicines at www.disposemymeds.org.          This list is accurate as of 3/1/18  3:59 PM.  Always use your most recent med list.                   Brand Name Dispense Instructions for use Diagnosis    * ACE/ARB/ARNI NOT PRESCRIBED (INTENTIONAL)      by Other route continuous prn.        amLODIPine 5 MG tablet    NORVASC    90 tablet    Take 1 tablet (5 mg) by mouth daily    Benign essential hypertension       * ASPIRIN NOT PRESCRIBED    INTENTIONAL     by Other route continuous prn.    CAD (coronary artery disease)       atorvastatin 80 MG tablet    LIPITOR    90 tablet    Take 1 tablet (80 mg) by mouth daily    Hyperlipidemia LDL goal <70       cephALEXin 500 MG capsule    KEFLEX    30 capsule    Take 1 capsule (500 mg) by mouth 3 times daily    Periorbital cellulitis of right eye       CIALIS 10 MG tablet   Generic drug:  tadalafil     12 tablet    TAKE 1 TABLET DAILY AS     NEEDED FOR ERECTILE        DYSFUNCTION    Drug-induced erectile dysfunction       clopidogrel 75 MG tablet    PLAVIX    90 tablet    Take 1 tablet (75 mg) by mouth daily    Coronary artery disease involving native coronary artery of native heart without angina pectoris       ezetimibe 10 MG tablet    ZETIA    90 tablet    Take 1 tablet (10 mg) by mouth daily    Hyperlipidemia LDL goal <70       MELATONIN PO      Take by mouth At Bedtime        metoprolol succinate 50 MG 24 hr tablet    TOPROL XL    90 tablet    Take 1 tablet (50 mg) by mouth daily    Coronary artery disease involving native coronary artery of  native heart without angina pectoris       triamcinolone 0.5 % cream    KENALOG    30 g    Apply to affected areas twice daily    Other psoriasis       UNABLE TO FIND      MEDICATION NAME: Taltz injection 70mg once per month        * Notice:  This list has 2 medication(s) that are the same as other medications prescribed for you. Read the directions carefully, and ask your doctor or other care provider to review them with you.

## 2018-03-01 NOTE — TELEPHONE ENCOUNTER
Patient is called and is reporting he was seen in [place] today.  Closing this encounter.  JAKOB LomeliN, RN

## 2018-03-02 ASSESSMENT — ANXIETY QUESTIONNAIRES: GAD7 TOTAL SCORE: 0

## 2018-03-02 ASSESSMENT — PATIENT HEALTH QUESTIONNAIRE - PHQ9: SUM OF ALL RESPONSES TO PHQ QUESTIONS 1-9: 1

## 2018-04-10 DIAGNOSIS — Z79.899 LONG TERM USE OF DRUG: Primary | ICD-10-CM

## 2018-05-05 ENCOUNTER — APPOINTMENT (OUTPATIENT)
Dept: LAB | Facility: CLINIC | Age: 58
End: 2018-05-05
Payer: COMMERCIAL

## 2018-05-05 DIAGNOSIS — Z79.899 LONG TERM USE OF DRUG: ICD-10-CM

## 2018-05-05 DIAGNOSIS — I10 BENIGN ESSENTIAL HYPERTENSION: ICD-10-CM

## 2018-05-05 DIAGNOSIS — E78.5 HYPERLIPIDEMIA LDL GOAL <70: ICD-10-CM

## 2018-05-05 LAB
ALBUMIN SERPL-MCNC: 4.2 G/DL (ref 3.4–5)
ALP SERPL-CCNC: 63 U/L (ref 40–150)
ALT SERPL W P-5'-P-CCNC: 57 U/L (ref 0–70)
ANION GAP SERPL CALCULATED.3IONS-SCNC: 6 MMOL/L (ref 3–14)
AST SERPL W P-5'-P-CCNC: 25 U/L (ref 0–45)
BILIRUB SERPL-MCNC: 0.7 MG/DL (ref 0.2–1.3)
BUN SERPL-MCNC: 14 MG/DL (ref 7–30)
CALCIUM SERPL-MCNC: 8.3 MG/DL (ref 8.5–10.1)
CHLORIDE SERPL-SCNC: 108 MMOL/L (ref 94–109)
CHOLEST SERPL-MCNC: 107 MG/DL
CO2 SERPL-SCNC: 27 MMOL/L (ref 20–32)
CREAT SERPL-MCNC: 0.82 MG/DL (ref 0.66–1.25)
ERYTHROCYTE [DISTWIDTH] IN BLOOD BY AUTOMATED COUNT: 13.4 % (ref 10–15)
GFR SERPL CREATININE-BSD FRML MDRD: >90 ML/MIN/1.7M2
GLUCOSE SERPL-MCNC: 93 MG/DL (ref 70–99)
HCT VFR BLD AUTO: 44.2 % (ref 40–53)
HDLC SERPL-MCNC: 35 MG/DL
HGB BLD-MCNC: 15.7 G/DL (ref 13.3–17.7)
LDLC SERPL CALC-MCNC: 55 MG/DL
MCH RBC QN AUTO: 31.8 PG (ref 26.5–33)
MCHC RBC AUTO-ENTMCNC: 35.5 G/DL (ref 31.5–36.5)
MCV RBC AUTO: 90 FL (ref 78–100)
NONHDLC SERPL-MCNC: 72 MG/DL
PLATELET # BLD AUTO: 214 10E9/L (ref 150–450)
POTASSIUM SERPL-SCNC: 4.1 MMOL/L (ref 3.4–5.3)
PROT SERPL-MCNC: 7.9 G/DL (ref 6.8–8.8)
RBC # BLD AUTO: 4.93 10E12/L (ref 4.4–5.9)
SODIUM SERPL-SCNC: 141 MMOL/L (ref 133–144)
TRIGL SERPL-MCNC: 87 MG/DL
WBC # BLD AUTO: 6 10E9/L (ref 4–11)

## 2018-05-05 PROCEDURE — 80061 LIPID PANEL: CPT | Performed by: DERMATOLOGY

## 2018-05-05 PROCEDURE — 85027 COMPLETE CBC AUTOMATED: CPT | Performed by: DERMATOLOGY

## 2018-05-05 PROCEDURE — 87389 HIV-1 AG W/HIV-1&-2 AB AG IA: CPT | Performed by: DERMATOLOGY

## 2018-05-05 PROCEDURE — 86803 HEPATITIS C AB TEST: CPT | Performed by: DERMATOLOGY

## 2018-05-05 PROCEDURE — 36415 COLL VENOUS BLD VENIPUNCTURE: CPT | Performed by: DERMATOLOGY

## 2018-05-05 PROCEDURE — 86708 HEPATITIS A ANTIBODY: CPT | Performed by: DERMATOLOGY

## 2018-05-05 PROCEDURE — 87522 HEPATITIS C REVRS TRNSCRPJ: CPT | Performed by: DERMATOLOGY

## 2018-05-05 PROCEDURE — 87340 HEPATITIS B SURFACE AG IA: CPT | Performed by: DERMATOLOGY

## 2018-05-05 PROCEDURE — 86704 HEP B CORE ANTIBODY TOTAL: CPT | Performed by: DERMATOLOGY

## 2018-05-05 PROCEDURE — 86480 TB TEST CELL IMMUN MEASURE: CPT | Performed by: DERMATOLOGY

## 2018-05-05 PROCEDURE — 80053 COMPREHEN METABOLIC PANEL: CPT | Performed by: DERMATOLOGY

## 2018-05-06 LAB — HCV AB SERPL QL IA: REACTIVE

## 2018-05-07 LAB
HAV IGG SER QL IA: REACTIVE
HBV CORE AB SERPL QL IA: NONREACTIVE
HBV SURFACE AG SERPL QL IA: NONREACTIVE
HIV 1+2 AB+HIV1 P24 AG SERPL QL IA: NONREACTIVE

## 2018-05-08 ENCOUNTER — OFFICE VISIT (OUTPATIENT)
Dept: CARDIOLOGY | Facility: CLINIC | Age: 58
End: 2018-05-08
Attending: INTERNAL MEDICINE
Payer: COMMERCIAL

## 2018-05-08 VITALS
WEIGHT: 218.8 LBS | SYSTOLIC BLOOD PRESSURE: 116 MMHG | HEART RATE: 54 BPM | DIASTOLIC BLOOD PRESSURE: 76 MMHG | OXYGEN SATURATION: 100 % | HEIGHT: 72 IN | BODY MASS INDEX: 29.64 KG/M2

## 2018-05-08 DIAGNOSIS — I10 BENIGN ESSENTIAL HYPERTENSION: ICD-10-CM

## 2018-05-08 DIAGNOSIS — I25.118 CORONARY ARTERY DISEASE OF NATIVE ARTERY OF NATIVE HEART WITH STABLE ANGINA PECTORIS (H): Primary | ICD-10-CM

## 2018-05-08 DIAGNOSIS — L40.0 PSORIASIS VULGARIS: Primary | ICD-10-CM

## 2018-05-08 LAB
HCV RNA SERPL NAA+PROBE-ACNC: NORMAL [IU]/ML
HCV RNA SERPL NAA+PROBE-LOG IU: NORMAL LOG IU/ML
M TB TUBERC IFN-G BLD QL: NEGATIVE
M TB TUBERC IFN-G/MITOGEN IGNF BLD: 0 IU/ML

## 2018-05-08 PROCEDURE — 86480 TB TEST CELL IMMUN MEASURE: CPT | Performed by: DERMATOLOGY

## 2018-05-08 PROCEDURE — 36415 COLL VENOUS BLD VENIPUNCTURE: CPT | Performed by: DERMATOLOGY

## 2018-05-08 PROCEDURE — 99213 OFFICE O/P EST LOW 20 MIN: CPT | Performed by: PHYSICIAN ASSISTANT

## 2018-05-08 NOTE — PROGRESS NOTES
Primary Cardiologist: Dr. Hernandez    History of Present Illness:   Leonides is a pleasant 4-year-old gentleman who presents to cardiology clinic for annual follow-up.  His past medical history is notable for coronary artery disease with known chronically occluded mid LAD with good distal collaterals, hypertension, hyperlipidemia, and HDL deficiency.    He returns to clinic stating that he is doing well.  He continues to have slight angina if he pushes himself too much but states that this is unchanged compared to last year's visit.  He denies any recent medical event such as admission, ER visit, surgeries.  He has no shortness of breath, PND, orthopnea, lightheadedness, dizziness, near-syncope, or syncope.  He switched jobs about 6 months ago which requires him to not travel as much.  He admits to not paying attention to his diet and gaining some weight.  He hopes to resume his exercise routine as the weather gets warmer.  He has no other questions or concerns at this time.    Assessment and plan:  Leonides is a pleasant 4-year-old gentleman who presents to cardiology clinic for annual follow-up.  His past medical history is notable for coronary artery disease with known chronically occluded mid LAD with good distal collaterals, hypertension, hyperlipidemia, and HDL deficiency.    He appears to be doing quite well from a cardiac standpoint.  He does have stable angina but this appears to be unchanged compared to last year.  His vital signs are stable and his laboratory work is unremarkable.  Of note he is on Plavix and not on aspirin due to frequent epochs taxies.  He denies any bleeding issues.  He knows to not combine nitroglycerin tablet with Cialis.  We will have him follow-up next year with repeat lab work.  We will also repeat echocardiogram as this has not been done for quite some time.     He is encouraged to contact her clinic if he has any worsening symptoms.  He verbalized understanding of this.    Thank you for  allowing me to participate in the care of this pleasant patient today.        This note was completed in part using Dragon voice recognition software. Although reviewed after completion, some word and grammatical errors may occur.    Orders this Visit:  No orders of the defined types were placed in this encounter.    No orders of the defined types were placed in this encounter.    Medications Discontinued During This Encounter   Medication Reason     cephALEXin (KEFLEX) 500 MG capsule Therapy completed         Encounter Diagnosis   Name Primary?     Coronary artery disease involving native coronary artery of native heart without angina pectoris        CURRENT MEDICATIONS:  Current Outpatient Prescriptions   Medication Sig Dispense Refill     amLODIPine (NORVASC) 5 MG tablet Take 1 tablet (5 mg) by mouth daily 90 tablet 3     ASPIRIN NOT PRESCRIBED, INTENTIONAL, by Other route continuous prn.  0     atorvastatin (LIPITOR) 80 MG tablet Take 1 tablet (80 mg) by mouth daily 90 tablet 2     CIALIS 10 MG tablet TAKE 1 TABLET DAILY AS     NEEDED FOR ERECTILE        DYSFUNCTION 12 tablet 0     clopidogrel (PLAVIX) 75 MG tablet Take 1 tablet (75 mg) by mouth daily 90 tablet 3     ezetimibe (ZETIA) 10 MG tablet Take 1 tablet (10 mg) by mouth daily 90 tablet 2     MELATONIN PO Take by mouth At Bedtime       metoprolol (TOPROL XL) 50 MG 24 hr tablet Take 1 tablet (50 mg) by mouth daily 90 tablet 3     triamcinolone (KENALOG) 0.5 % cream Apply to affected areas twice daily 30 g 2     UNABLE TO FIND MEDICATION NAME: Taltz injection 70mg once per month       ACE/ARB NOT PRESCRIBED, INTENTIONAL, by Other route continuous prn.         ALLERGIES     Allergies   Allergen Reactions     No Known Drug Allergies        PAST MEDICAL HISTORY:  Past Medical History:   Diagnosis Date     CAD (coronary artery disease) 7/19/2010     History of angina pectoris 3/1/2018     Hyperlipidemia LDL goal < 100 7/19/2010     Hypertension 7/19/2010      Other psoriasis 12/15/2005       PAST SURGICAL HISTORY:  Past Surgical History:   Procedure Laterality Date     COLONOSCOPY  02/10/10     HC LAPAROSCOPY, SURGICAL; APPENDECTOMY  7/17/10     REMOVAL OF SPERM DUCT(S)         FAMILY HISTORY:  Family History   Problem Relation Age of Onset     Allergies Daughter      environment al     Allergies Son      same as sister     Alzheimer Disease Father       at age 73     Blood Disease Father      CANCER Maternal Grandfather      [place] lung -     HEART DISEASE Maternal Grandmother      Psychotic Disorder Brother        SOCIAL HISTORY:  Social History     Social History     Marital status:      Spouse name: N/A     Number of children: N/A     Years of education: N/A     Social History Main Topics     Smoking status: Former Smoker     Packs/day: 2.00     Years: 12.00     Smokeless tobacco: Never Used      Comment: Quit at age 25     Alcohol use Yes      Comment: beer weekly     Drug use: No     Sexual activity: Yes     Partners: Female     Other Topics Concern     Not on file     Social History Narrative       Review of Systems:  Skin:  Positive for     Eyes:  Positive for    ENT:  Negative    Respiratory:  Negative    Cardiovascular:  Negative for;palpitations;lightheadedness;dizziness fatigue;Positive for;chest pain  Gastroenterology: Negative    Genitourinary:  Negative    Musculoskeletal:  Positive for arthritis  Neurologic:  Negative    Psychiatric:  Positive for sleep disturbances  Heme/Lymph/Imm:       Endocrine:  Negative      Physical Exam:  Vitals: /76 (BP Location: Right arm, Patient Position: Fowlers, Cuff Size: Adult Regular)  Pulse 54  Ht 1.829 m (6')  Wt 99.2 kg (218 lb 12.8 oz)  SpO2 100%  BMI 29.67 kg/m2     GEN:  NAD  NECK: No JVD  C/V:  Regular rate and rhythm, no murmur, rub or gallop.  RESP: Clear to auscultation bilaterally without wheezing, rales, or rhonchi.  GI: Abdomen soft, nontender, nondistended. No HSM appreciated.    EXTREM: Trace LLE edema (chronic).  No RLE edema.  NEURO: Alert and oriented, cooperative. No obvious focal deficits.   PSYCH: Normal affect.  SKIN: Warm and dry.       Recent Lab Results:  LIPID RESULTS:  Lab Results   Component Value Date    CHOL 107 05/05/2018    HDL 35 (L) 05/05/2018    LDL 55 05/05/2018    TRIG 87 05/05/2018    CHOLHDLRATIO 2.8 05/04/2015       LIVER ENZYME RESULTS:  Lab Results   Component Value Date    AST 25 05/05/2018    ALT 57 05/05/2018       CBC RESULTS:  Lab Results   Component Value Date    WBC 6.0 05/05/2018    RBC 4.93 05/05/2018    HGB 15.7 05/05/2018    HCT 44.2 05/05/2018    MCV 90 05/05/2018    MCH 31.8 05/05/2018    MCHC 35.5 05/05/2018    RDW 13.4 05/05/2018     05/05/2018       BMP RESULTS:  Lab Results   Component Value Date     05/05/2018    POTASSIUM 4.1 05/05/2018    CHLORIDE 108 05/05/2018    CO2 27 05/05/2018    ANIONGAP 6 05/05/2018    GLC 93 05/05/2018    BUN 14 05/05/2018    CR 0.82 05/05/2018    GFRESTIMATED >90 05/05/2018    GFRESTBLACK >90 05/05/2018    JUAN JOSE 8.3 (L) 05/05/2018        A1C RESULTS:  No results found for: A1C    INR RESULTS:  No results found for: INR        Priti Harris PA-C   May 8, 2018

## 2018-05-08 NOTE — LETTER
5/8/2018    Gregory G. Schoen, MD  [address] [place] [address]    RE: Leonides Paul       Dear Colleague,    I had the pleasure of seeing Leonides Paul in the [facility].    Primary Cardiologist: Dr. Hernandez    History of Present Illness:   Leonides is a pleasant 4-year-old gentleman who presents to cardiology clinic for annual follow-up.  His past medical history is notable for coronary artery disease with known chronically occluded mid LAD with good distal collaterals, hypertension, hyperlipidemia, and HDL deficiency.    He returns to clinic stating that he is doing well.  He continues to have slight angina if he pushes himself too much but states that this is unchanged compared to last year's visit.  He denies any recent medical event such as admission, ER visit, surgeries.  He has no shortness of breath, PND, orthopnea, lightheadedness, dizziness, near-syncope, or syncope.  He switched jobs about 6 months ago which requires him to not travel as much.  He admits to not paying attention to his diet and gaining some weight.  He hopes to resume his exercise routine as the weather gets warmer.  He has no other questions or concerns at this time.    Assessment and plan:  Leonides is a pleasant 4-year-old gentleman who presents to cardiology clinic for annual follow-up.  His past medical history is notable for coronary artery disease with known chronically occluded mid LAD with good distal collaterals, hypertension, hyperlipidemia, and HDL deficiency.    He appears to be doing quite well from a cardiac standpoint.  He does have stable angina but this appears to be unchanged compared to last year.  His vital signs are stable and his laboratory work is unremarkable.  Of note he is on Plavix and not on aspirin due to frequent epochs taxies.  He denies any bleeding issues.  He knows to not combine nitroglycerin tablet with Cialis.  We will have him follow-up next year with repeat  lab work.  We will also repeat echocardiogram as this has not been done for quite some time.     He is encouraged to contact her clinic if he has any worsening symptoms.  He verbalized understanding of this.    Thank you for allowing me to participate in the care of this pleasant patient today.        This note was completed in part using Dragon voice recognition software. Although reviewed after completion, some word and grammatical errors may occur.    Orders this Visit:  No orders of the defined types were placed in this encounter.    No orders of the defined types were placed in this encounter.    Medications Discontinued During This Encounter   Medication Reason     cephALEXin (KEFLEX) 500 MG capsule Therapy completed         Encounter Diagnosis   Name Primary?     Coronary artery disease involving native coronary artery of native heart without angina pectoris        CURRENT MEDICATIONS:  Current Outpatient Prescriptions   Medication Sig Dispense Refill     amLODIPine (NORVASC) 5 MG tablet Take 1 tablet (5 mg) by mouth daily 90 tablet 3     ASPIRIN NOT PRESCRIBED, INTENTIONAL, by Other route continuous prn.  0     atorvastatin (LIPITOR) 80 MG tablet Take 1 tablet (80 mg) by mouth daily 90 tablet 2     CIALIS 10 MG tablet TAKE 1 TABLET DAILY AS     NEEDED FOR ERECTILE        DYSFUNCTION 12 tablet 0     clopidogrel (PLAVIX) 75 MG tablet Take 1 tablet (75 mg) by mouth daily 90 tablet 3     ezetimibe (ZETIA) 10 MG tablet Take 1 tablet (10 mg) by mouth daily 90 tablet 2     MELATONIN PO Take by mouth At Bedtime       metoprolol (TOPROL XL) 50 MG 24 hr tablet Take 1 tablet (50 mg) by mouth daily 90 tablet 3     triamcinolone (KENALOG) 0.5 % cream Apply to affected areas twice daily 30 g 2     UNABLE TO FIND MEDICATION NAME: Taltz injection 70mg once per month       ACE/ARB NOT PRESCRIBED, INTENTIONAL, by Other route continuous prn.         ALLERGIES     Allergies   Allergen Reactions     No Known Drug Allergies         PAST MEDICAL HISTORY:  Past Medical History:   Diagnosis Date     CAD (coronary artery disease) 2010     History of angina pectoris 3/1/2018     Hyperlipidemia LDL goal < 100 2010     Hypertension 2010     Other psoriasis 12/15/2005       PAST SURGICAL HISTORY:  Past Surgical History:   Procedure Laterality Date     COLONOSCOPY  02/10/10     HC LAPAROSCOPY, SURGICAL; APPENDECTOMY  7/17/10     REMOVAL OF SPERM DUCT(S)         FAMILY HISTORY:  Family History   Problem Relation Age of Onset     Allergies Daughter      environment al     Allergies Son      same as sister     Alzheimer Disease Father       at age 73     Blood Disease Father      CANCER Maternal Grandfather      [place] lung -     HEART DISEASE Maternal Grandmother      Psychotic Disorder Brother        SOCIAL HISTORY:  Social History     Social History     Marital status:      Spouse name: N/A     Number of children: N/A     Years of education: N/A     Social History Main Topics     Smoking status: Former Smoker     Packs/day: 2.00     Years: 12.00     Smokeless tobacco: Never Used      Comment: Quit at age 25     Alcohol use Yes      Comment: beer weekly     Drug use: No     Sexual activity: Yes     Partners: Female     Other Topics Concern     Not on file     Social History Narrative       Review of Systems:  Skin:  Positive for     Eyes:  Positive for    ENT:  Negative    Respiratory:  Negative    Cardiovascular:  Negative for;palpitations;lightheadedness;dizziness fatigue;Positive for;chest pain  Gastroenterology: Negative    Genitourinary:  Negative    Musculoskeletal:  Positive for arthritis  Neurologic:  Negative    Psychiatric:  Positive for sleep disturbances  Heme/Lymph/Imm:       Endocrine:  Negative      Physical Exam:  Vitals: /76 (BP Location: Right arm, Patient Position: Fowlers, Cuff Size: Adult Regular)  Pulse 54  Ht 1.829 m (6')  Wt 99.2 kg (218 lb 12.8 oz)  SpO2 100%  BMI 29.67 kg/m2      GEN:  NAD  NECK: No JVD  C/V:  Regular rate and rhythm, no murmur, rub or gallop.  RESP: Clear to auscultation bilaterally without wheezing, rales, or rhonchi.  GI: Abdomen soft, nontender, nondistended. No HSM appreciated.   EXTREM: Trace LLE edema (chronic).  No RLE edema.  NEURO: Alert and oriented, cooperative. No obvious focal deficits.   PSYCH: Normal affect.  SKIN: Warm and dry.       Recent Lab Results:  LIPID RESULTS:  Lab Results   Component Value Date    CHOL 107 05/05/2018    HDL 35 (L) 05/05/2018    LDL 55 05/05/2018    TRIG 87 05/05/2018    CHOLHDLRATIO 2.8 05/04/2015       LIVER ENZYME RESULTS:  Lab Results   Component Value Date    AST 25 05/05/2018    ALT 57 05/05/2018       CBC RESULTS:  Lab Results   Component Value Date    WBC 6.0 05/05/2018    RBC 4.93 05/05/2018    HGB 15.7 05/05/2018    HCT 44.2 05/05/2018    MCV 90 05/05/2018    MCH 31.8 05/05/2018    MCHC 35.5 05/05/2018    RDW 13.4 05/05/2018     05/05/2018       BMP RESULTS:  Lab Results   Component Value Date     05/05/2018    POTASSIUM 4.1 05/05/2018    CHLORIDE 108 05/05/2018    CO2 27 05/05/2018    ANIONGAP 6 05/05/2018    GLC 93 05/05/2018    BUN 14 05/05/2018    CR 0.82 05/05/2018    GFRESTIMATED >90 05/05/2018    GFRESTBLACK >90 05/05/2018    JUAN JOSE 8.3 (L) 05/05/2018        A1C RESULTS:  No results found for: A1C    INR RESULTS:  No results found for: INR        Priti Harris PA-C   May 8, 2018       Thank you for allowing me to participate in the care of your patient.    Sincerely,     Priti Harris PA-C     [facility]

## 2018-05-08 NOTE — MR AVS SNAPSHOT
After Visit Summary   5/8/2018    Leonides Paul    MRN: [id number]           Patient Information     Date Of Birth          1960        Visit Information        Provider Department      5/8/2018 10:45 AM Priti Harris PA-C [facility]        Today's Diagnoses     Coronary artery disease involving native coronary artery of native heart without angina pectoris          Care Instructions    Today's Plan:   1) Continue with current medications.   2) Focus on weight loss.   3) Your labs are normal.   4) Follow up with us next year.     If you have questions or concerns please call clinic at [phone].    Please call [phone] for scheduling.       It was a pleasure seeing you today!     Reminder: Please bring in all current medications, over the counter supplements and vitamin bottles to your next appointment.    Priti Harris  5/8/2018              Follow-ups after your visit        Who to contact     If you have questions or need follow up information about today's clinic visit or your schedule please contact [facility] directly at [phone].  Normal or non-critical lab and imaging results will be communicated to you by [employer], letter or phone within 4 business days after the clinic has received the results. If you do not hear from us within 7 days, please contact the clinic through [employer] or phone. If you have a critical or abnormal lab result, we will notify you by phone as soon as possible.  Submit refill requests through [employer] or call your pharmacy and they will forward the refill request to us. Please allow 3 business days for your refill to be completed.          Additional Information About Your Visit        MyChart Information     RepRegen lets you send messages to your doctor, view your test results, renew your prescriptions, schedule appointments and more. To sign up, go to  [email or web address] . Click on \"Log in\" on the left side of the screen, which will take you to the Welcome page. Then click on \"Sign up Now\" on the right side of the page.     You will be asked to enter the access code listed below, as well as some personal information. Please follow the directions to create your username and password.     Your access code is: [email or web address]  Expires: 2018  4:59 PM     Your access code will  in 90 days. If you need help or a new code, please call your [facility] clinic or [phone].        Care EveryWhere ID     This is your Care EveryWhere ID. This could be used by other organizations to access your [facility] medical records  [id number]        Your Vitals Were     Pulse Height Pulse Oximetry BMI (Body Mass Index)          54 1.829 m (6') 100% 29.67 kg/m2         Blood Pressure from Last 3 Encounters:   18 116/76   18 124/66   17 118/82    Weight from Last 3 Encounters:   18 99.2 kg (218 lb 12.8 oz)   18 95.7 kg (211 lb)   17 95.3 kg (210 lb 3.2 oz)              We Performed the Following     Follow-Up with Cardiologist        Primary Care Provider Office Phone # Fax #    Gregory G Schoen, [phone] [address] [place] [address]        Equal Access to Services     [facility] AH: Hadii aad ku hadasho Soomaali, waaxda luqadaha, qaybta kaalmada adeegyada, waxay brock naqvi. So [facility] [phone].    ATENCIÓN: Si habla español, tiene a pierce disposición servicios gratuitos de asistencia lingüística. Llame al [phone].    We comply with applicable federal civil rights laws and [place] laws. We do not discriminate on the basis of race, color, national origin, age, disability, sex, sexual orientation, or gender identity.            Thank you!     Thank you for choosing [facility]  for your care. Our goal is always to provide you with " excellent care. Hearing back from our patients is one way we can continue to improve our services. Please take a few minutes to complete the written survey that you may receive in the mail after your visit with us. Thank you!             Your Updated Medication List - Protect others around you: Learn how to safely use, store and throw away your medicines at www.disposemymeds.org.          This list is accurate as of 5/8/18 11:06 AM.  Always use your most recent med list.                   Brand Name Dispense Instructions for use Diagnosis    * ACE/ARB/ARNI NOT PRESCRIBED (INTENTIONAL)      by Other route continuous prn.        amLODIPine 5 MG tablet    NORVASC    90 tablet    Take 1 tablet (5 mg) by mouth daily    Benign essential hypertension       * ASPIRIN NOT PRESCRIBED    INTENTIONAL     by Other route continuous prn.    CAD (coronary artery disease)       atorvastatin 80 MG tablet    LIPITOR    90 tablet    Take 1 tablet (80 mg) by mouth daily    Hyperlipidemia LDL goal <70       CIALIS 10 MG tablet   Generic drug:  tadalafil     12 tablet    TAKE 1 TABLET DAILY AS     NEEDED FOR ERECTILE        DYSFUNCTION    Drug-induced erectile dysfunction       clopidogrel 75 MG tablet    PLAVIX    90 tablet    Take 1 tablet (75 mg) by mouth daily    Coronary artery disease involving native coronary artery of native heart without angina pectoris       ezetimibe 10 MG tablet    ZETIA    90 tablet    Take 1 tablet (10 mg) by mouth daily    Hyperlipidemia LDL goal <70       MELATONIN PO      Take by mouth At Bedtime        metoprolol succinate 50 MG 24 hr tablet    TOPROL XL    90 tablet    Take 1 tablet (50 mg) by mouth daily    Coronary artery disease involving native coronary artery of native heart without angina pectoris       triamcinolone 0.5 % cream    KENALOG    30 g    Apply to affected areas twice daily    Other psoriasis       UNABLE TO FIND      MEDICATION NAME: Taltz injection 70mg once per month        *  Notice:  This list has 2 medication(s) that are the same as other medications prescribed for you. Read the directions carefully, and ask your doctor or other care provider to review them with you.

## 2018-05-08 NOTE — PATIENT INSTRUCTIONS
Today's Plan:   1) Continue with current medications.   2) Focus on weight loss.   3) Your labs are normal.   4) Follow up with us next year.     If you have questions or concerns please call clinic at [phone].    Please call [phone] for scheduling.       It was a pleasure seeing you today!     Reminder: Please bring in all current medications, over the counter supplements and vitamin bottles to your next appointment.    Priti Harris  5/8/2018

## 2018-05-09 DIAGNOSIS — N52.2 DRUG-INDUCED ERECTILE DYSFUNCTION: ICD-10-CM

## 2018-05-10 RX ORDER — TADALAFIL 10 MG
TABLET ORAL
Qty: 12 TABLET | Refills: 0 | Status: SHIPPED | OUTPATIENT
Start: 2018-05-10 | End: 2018-07-31

## 2018-05-10 NOTE — TELEPHONE ENCOUNTER
Routing refill request to provider for review/approval because:  Patient needs to be seen because it has been more than 1 year since last office visit.    Didi Solorio RN

## 2018-05-10 NOTE — TELEPHONE ENCOUNTER
"Requested Prescriptions   Pending Prescriptions Disp Refills     CIALIS 10 MG tablet [Pharmacy Med Name: CIALIS TAB 10MG] 12 tablet 0     Sig: TAKE 1 TABLET DAILY AS     NEEDED FOR ERECTILE        DYSFUNCTION    Erectile Dysfuction Protocol Passed    5/9/2018  6:15 PM       Passed - Absence of nitrates on medication list       Passed - Absence of Alpha Blockers on Med list       Passed - Recent (12 mo) or future (30 days) visit within the authorizing provider's specialty    Patient had office visit in the last 12 months or has a visit in the next 30 days with authorizing provider or within the authorizing provider's specialty.  See \"Patient Info\" tab in inbasket, or \"Choose Columns\" in Meds & Orders section of the refill encounter.           Passed - Patient is age 18 or older        Last Written Prescription Date:  3/1/2018  Last Fill Quantity: 12,  # refills: 0   Last office visit: 3/1/2018 with prescribing provider:  3/1/2018   Future Office Visit:      "

## 2018-05-11 LAB
M TB TUBERC IFN-G BLD QL: NEGATIVE
M TB TUBERC IFN-G/MITOGEN IGNF BLD: 0.02 IU/ML

## 2018-05-21 DIAGNOSIS — E78.5 HYPERLIPIDEMIA LDL GOAL <70: ICD-10-CM

## 2018-05-21 RX ORDER — EZETIMIBE 10 MG/1
10 TABLET ORAL DAILY
Qty: 90 TABLET | Refills: 3 | Status: SHIPPED | OUTPATIENT
Start: 2018-05-21 | End: 2019-05-13

## 2018-07-13 DIAGNOSIS — I25.10 CORONARY ARTERY DISEASE INVOLVING NATIVE CORONARY ARTERY OF NATIVE HEART WITHOUT ANGINA PECTORIS: ICD-10-CM

## 2018-07-13 RX ORDER — METOPROLOL SUCCINATE 50 MG/1
50 TABLET, EXTENDED RELEASE ORAL DAILY
Qty: 90 TABLET | Refills: 3 | Status: SHIPPED | OUTPATIENT
Start: 2018-07-13 | End: 2019-06-27

## 2018-07-13 RX ORDER — CLOPIDOGREL BISULFATE 75 MG/1
75 TABLET ORAL DAILY
Qty: 90 TABLET | Refills: 3 | Status: SHIPPED | OUTPATIENT
Start: 2018-07-13 | End: 2019-06-27

## 2018-07-31 DIAGNOSIS — N52.2 DRUG-INDUCED ERECTILE DYSFUNCTION: ICD-10-CM

## 2018-08-01 RX ORDER — TADALAFIL 10 MG
TABLET ORAL
Qty: 12 TABLET | Refills: 5 | Status: SHIPPED | OUTPATIENT
Start: 2018-08-01 | End: 2019-04-22

## 2018-08-01 NOTE — TELEPHONE ENCOUNTER
"Requested Prescriptions   Pending Prescriptions Disp Refills     CIALIS 10 MG tablet [Pharmacy Med Name: CIALIS TAB 10MG] 12 tablet 0    Last Written Prescription Date:  5/10/18  Last Fill Quantity: 12,  # refills: 0   Last office visit: 3/1/2018 with prescribing provider:  4/19/17   Future Office Visit:     Sig: TAKE 1 TABLET DAILY AS     NEEDED FOR ERECTILE        DYSFUNCTION    Erectile Dysfuction Protocol Passed    7/31/2018  6:06 PM       Passed - Absence of nitrates on medication list       Passed - Absence of Alpha Blockers on Med list       Passed - Recent (12 mo) or future (30 days) visit within the authorizing provider's specialty    Patient had office visit in the last 12 months or has a visit in the next 30 days with authorizing provider or within the authorizing provider's specialty.  See \"Patient Info\" tab in inbasket, or \"Choose Columns\" in Meds & Orders section of the refill encounter.           Passed - Patient is age 18 or older          "

## 2018-09-24 ENCOUNTER — ALLIED HEALTH/NURSE VISIT (OUTPATIENT)
Dept: FAMILY MEDICINE | Facility: CLINIC | Age: 58
End: 2018-09-24
Payer: COMMERCIAL

## 2018-09-24 DIAGNOSIS — Z23 NEED FOR PROPHYLACTIC VACCINATION AND INOCULATION AGAINST INFLUENZA: Primary | ICD-10-CM

## 2018-09-24 PROCEDURE — 99207 ZZC NO CHARGE NURSE ONLY: CPT

## 2018-09-24 PROCEDURE — 90682 RIV4 VACC RECOMBINANT DNA IM: CPT

## 2018-09-24 PROCEDURE — 90471 IMMUNIZATION ADMIN: CPT

## 2018-09-24 NOTE — MR AVS SNAPSHOT
After Visit Summary   9/24/2018    Leonides Paul    MRN: [id number]           Patient Information     Date Of Birth          1960        Visit Information        Provider Department      9/24/2018 1:30 PM SALVATORE VALENZUELA [facility] [place]        Today's Diagnoses     Need for prophylactic vaccination and inoculation against influenza    -  1       Follow-ups after your visit        Your next 10 appointments already scheduled     Sep 24, 2018  1:30 PM CDT   Nurse Only with [facility] ([facility])    [address] [place] [id number] [phone]              Who to contact     If you have questions or need follow up information about today's clinic visit or your schedule please contact [facility] directly at [phone].  Normal or non-critical lab and imaging results will be communicated to you by MyChart, letter or phone within 4 business days after the clinic has received the results. If you do not hear from us within 7 days, please contact the clinic through MyChart or phone. If you have a critical or abnormal lab result, we will notify you by phone as soon as possible.  Submit refill requests through [employer] or call your pharmacy and they will forward the refill request to us. Please allow 3 business days for your refill to be completed.          Additional Information About Your Visit        Care EveryWhere ID     This is your Care EveryWhere ID. This could be used by other organizations to access your [place] medical records  [id number]         Blood Pressure from Last 3 Encounters:   05/08/18 116/76   03/01/18 124/66   05/02/17 118/82    Weight from Last 3 Encounters:   05/08/18 218 lb 12.8 oz (99.2 kg)   03/01/18 211 lb (95.7 kg)   05/02/17 210 lb 3.2 oz (95.3 kg)              We Performed the Following     FLU VACCINE, (RIV4) RECOMBINANT HA  , IM (FluBlok, egg free) [80610]- >18 YRS ([facility] recommended   50-64 YRS)     Vaccine Administration, Initial [91976]        Primary Care Provider Office Phone # Fax #    Gregory G Schoen, [phone]       [address] [place] [address]        Equal Access to Services     MOHSENBRANDON NATALY : Hadii aad ku hadasho Soomaali, waaxda luqadaha, qaybta kaalmada adeegyada, waxay idiin haysobeidan adejaky khni laozzie naqvi. So [facility] [phone].    ATENCIÓN: Si habla español, tiene a pierce disposición servicios gratuitos de asistencia lingüística. Llame al [phone].    We comply with applicable federal civil rights laws and [place] laws. We do not discriminate on the basis of race, color, national origin, age, disability, sex, sexual orientation, or gender identity.            Thank you!     Thank you for choosing [facility]  for your care. Our goal is always to provide you with excellent care. Hearing back from our patients is one way we can continue to improve our services. Please take a few minutes to complete the written survey that you may receive in the mail after your visit with us. Thank you!             Your Updated Medication List - Protect others around you: Learn how to safely use, store and throw away your medicines at www.disposemymeds.org.          This list is accurate as of 9/24/18  1:28 PM.  Always use your most recent med list.                   Brand Name Dispense Instructions for use Diagnosis    * ACE/ARB/ARNI NOT PRESCRIBED (INTENTIONAL)      by Other route continuous prn.        amLODIPine 5 MG tablet    NORVASC    90 tablet    Take 1 tablet (5 mg) by mouth daily    Benign essential hypertension       * ASPIRIN NOT PRESCRIBED    INTENTIONAL     by Other route continuous prn.    CAD (coronary artery disease)       atorvastatin 80 MG tablet    LIPITOR    90 tablet    Take 1 tablet (80 mg) by mouth daily    Hyperlipidemia LDL goal <70       CIALIS 10 MG tablet   Generic drug:  tadalafil     12 tablet    TAKE 1 TABLET DAILY AS      NEEDED FOR ERECTILE        DYSFUNCTION    Drug-induced erectile dysfunction       clopidogrel 75 MG tablet    PLAVIX    90 tablet    Take 1 tablet (75 mg) by mouth daily    Coronary artery disease involving native coronary artery of native heart without angina pectoris       ezetimibe 10 MG tablet    ZETIA    90 tablet    Take 1 tablet (10 mg) by mouth daily    Hyperlipidemia LDL goal <70       MELATONIN PO      Take by mouth At Bedtime        metoprolol succinate 50 MG 24 hr tablet    TOPROL XL    90 tablet    Take 1 tablet (50 mg) by mouth daily    Coronary artery disease involving native coronary artery of native heart without angina pectoris       triamcinolone 0.5 % cream    KENALOG    30 g    Apply to affected areas twice daily    Other psoriasis       UNABLE TO FIND      MEDICATION NAME: Taltz injection 70mg once per month        * Notice:  This list has 2 medication(s) that are the same as other medications prescribed for you. Read the directions carefully, and ask your doctor or other care provider to review them with you.

## 2018-09-24 NOTE — PROGRESS NOTES
Injectable Influenza Immunization Documentation    1.  Is the person to be vaccinated sick today?   No    2. Does the person to be vaccinated have an allergy to a component   of the vaccine?   No  Egg Allergy Algorithm Link    3. Has the person to be vaccinated ever had a serious reaction   to influenza vaccine in the past?   No    4. Has the person to be vaccinated ever had Guillain-Barré syndrome?   No    Form completed by Zehra Jacob MA   Prior to injection verified patient identity using patient's name and date of birth.  Due to injection administration, patient instructed to remain in clinic for 15 minutes  afterwards, and to report any adverse reaction to me immediately.    Zehra Jacob MA

## 2018-09-25 ENCOUNTER — MEDICAL CORRESPONDENCE (OUTPATIENT)
Dept: HEALTH INFORMATION MANAGEMENT | Facility: CLINIC | Age: 58
End: 2018-09-25

## 2018-09-25 DIAGNOSIS — Z79.899 DRUG THERAPY: ICD-10-CM

## 2018-09-25 DIAGNOSIS — L40.0 PLAQUE PSORIASIS: Primary | ICD-10-CM

## 2018-10-01 DIAGNOSIS — I10 BENIGN ESSENTIAL HYPERTENSION: ICD-10-CM

## 2018-10-01 RX ORDER — AMLODIPINE BESYLATE 5 MG/1
5 TABLET ORAL DAILY
Qty: 90 TABLET | Refills: 3 | Status: SHIPPED | OUTPATIENT
Start: 2018-10-01 | End: 2019-09-16

## 2018-10-02 DIAGNOSIS — E78.5 HYPERLIPIDEMIA LDL GOAL <70: ICD-10-CM

## 2018-10-02 RX ORDER — ATORVASTATIN CALCIUM 80 MG/1
80 TABLET, FILM COATED ORAL DAILY
Qty: 90 TABLET | Refills: 2 | Status: SHIPPED | OUTPATIENT
Start: 2018-10-02 | End: 2019-06-27

## 2018-11-01 DIAGNOSIS — L40.0 PLAQUE PSORIASIS: Primary | ICD-10-CM

## 2018-11-03 DIAGNOSIS — Z79.899 DRUG THERAPY: ICD-10-CM

## 2018-11-03 DIAGNOSIS — L40.0 PLAQUE PSORIASIS: ICD-10-CM

## 2018-11-03 LAB
ALBUMIN SERPL-MCNC: 4.1 G/DL (ref 3.4–5)
ALP SERPL-CCNC: 76 U/L (ref 40–150)
ALT SERPL W P-5'-P-CCNC: 90 U/L (ref 0–70)
ANION GAP SERPL CALCULATED.3IONS-SCNC: 7 MMOL/L (ref 3–14)
AST SERPL W P-5'-P-CCNC: 30 U/L (ref 0–45)
BASOPHILS # BLD AUTO: 0 10E9/L (ref 0–0.2)
BASOPHILS NFR BLD AUTO: 0.3 %
BILIRUB DIRECT SERPL-MCNC: 0.2 MG/DL (ref 0–0.2)
BILIRUB SERPL-MCNC: 0.7 MG/DL (ref 0.2–1.3)
BUN SERPL-MCNC: 15 MG/DL (ref 7–30)
CALCIUM SERPL-MCNC: 8.9 MG/DL (ref 8.5–10.1)
CHLORIDE SERPL-SCNC: 104 MMOL/L (ref 94–109)
CO2 SERPL-SCNC: 29 MMOL/L (ref 20–32)
CREAT SERPL-MCNC: 0.91 MG/DL (ref 0.66–1.25)
DIFFERENTIAL METHOD BLD: NORMAL
EOSINOPHIL NFR BLD AUTO: 2.2 %
ERYTHROCYTE [DISTWIDTH] IN BLOOD BY AUTOMATED COUNT: 12.5 % (ref 10–15)
GFR SERPL CREATININE-BSD FRML MDRD: 85 ML/MIN/1.7M2
GLUCOSE SERPL-MCNC: 102 MG/DL (ref 70–99)
HCT VFR BLD AUTO: 44.1 % (ref 40–53)
HGB BLD-MCNC: 15.4 G/DL (ref 13.3–17.7)
IMM GRANULOCYTES # BLD: 0 10E9/L (ref 0–0.4)
IMM GRANULOCYTES NFR BLD: 0.2 %
LYMPHOCYTES # BLD AUTO: 2.5 10E9/L (ref 0.8–5.3)
LYMPHOCYTES NFR BLD AUTO: 38.8 %
MCH RBC QN AUTO: 31.9 PG (ref 26.5–33)
MCHC RBC AUTO-ENTMCNC: 34.9 G/DL (ref 31.5–36.5)
MCV RBC AUTO: 91 FL (ref 78–100)
MONOCYTES # BLD AUTO: 0.5 10E9/L (ref 0–1.3)
MONOCYTES NFR BLD AUTO: 8.5 %
NEUTROPHILS # BLD AUTO: 3.2 10E9/L (ref 1.6–8.3)
NEUTROPHILS NFR BLD AUTO: 50 %
NRBC # BLD AUTO: 0 10*3/UL
NRBC BLD AUTO-RTO: 0 /100
PLATELET # BLD AUTO: 212 10E9/L (ref 150–450)
POTASSIUM SERPL-SCNC: 3.9 MMOL/L (ref 3.4–5.3)
PROT SERPL-MCNC: 8 G/DL (ref 6.8–8.8)
RBC # BLD AUTO: 4.83 10E12/L (ref 4.4–5.9)
SODIUM SERPL-SCNC: 140 MMOL/L (ref 133–144)
WBC # BLD AUTO: 6.3 10E9/L (ref 4–11)

## 2018-11-03 PROCEDURE — 85025 COMPLETE CBC W/AUTO DIFF WBC: CPT | Performed by: DERMATOLOGY

## 2018-11-03 PROCEDURE — 86480 TB TEST CELL IMMUN MEASURE: CPT | Performed by: DERMATOLOGY

## 2018-11-03 PROCEDURE — 82248 BILIRUBIN DIRECT: CPT | Performed by: DERMATOLOGY

## 2018-11-03 PROCEDURE — 80053 COMPREHEN METABOLIC PANEL: CPT | Performed by: DERMATOLOGY

## 2018-11-03 PROCEDURE — 36415 COLL VENOUS BLD VENIPUNCTURE: CPT | Performed by: DERMATOLOGY

## 2018-11-03 PROCEDURE — 87389 HIV-1 AG W/HIV-1&-2 AB AG IA: CPT | Performed by: DERMATOLOGY

## 2018-11-05 LAB — HIV 1+2 AB+HIV1 P24 AG SERPL QL IA: NONREACTIVE

## 2018-11-06 LAB
GAMMA INTERFERON BACKGROUND BLD IA-ACNC: 0.01 IU/ML
M TB IFN-G BLD-IMP: NEGATIVE
M TB IFN-G CD4+ BCKGRND COR BLD-ACNC: 5.42 IU/ML
MITOGEN IGNF BCKGRD COR BLD-ACNC: 0 IU/ML
MITOGEN IGNF BCKGRD COR BLD-ACNC: 0.01 IU/ML

## 2018-12-13 ENCOUNTER — MEDICAL CORRESPONDENCE (OUTPATIENT)
Dept: HEALTH INFORMATION MANAGEMENT | Facility: CLINIC | Age: 58
End: 2018-12-13

## 2018-12-14 DIAGNOSIS — Z79.899 LONG TERM USE OF DRUG: Primary | ICD-10-CM

## 2018-12-14 DIAGNOSIS — Z79.899 LONG TERM USE OF DRUG: ICD-10-CM

## 2018-12-14 LAB
ALT SERPL W P-5'-P-CCNC: 84 U/L (ref 0–70)
AST SERPL W P-5'-P-CCNC: 25 U/L (ref 0–45)

## 2018-12-14 PROCEDURE — 84460 ALANINE AMINO (ALT) (SGPT): CPT | Performed by: DERMATOLOGY

## 2018-12-14 PROCEDURE — 84450 TRANSFERASE (AST) (SGOT): CPT | Performed by: DERMATOLOGY

## 2018-12-14 PROCEDURE — 36415 COLL VENOUS BLD VENIPUNCTURE: CPT | Performed by: DERMATOLOGY

## 2019-02-15 ENCOUNTER — OFFICE VISIT (OUTPATIENT)
Dept: FAMILY MEDICINE | Facility: CLINIC | Age: 59
End: 2019-02-15
Payer: COMMERCIAL

## 2019-02-15 VITALS
DIASTOLIC BLOOD PRESSURE: 82 MMHG | OXYGEN SATURATION: 95 % | TEMPERATURE: 97.6 F | HEIGHT: 72 IN | BODY MASS INDEX: 29.26 KG/M2 | SYSTOLIC BLOOD PRESSURE: 130 MMHG | HEART RATE: 64 BPM | RESPIRATION RATE: 12 BRPM | WEIGHT: 216 LBS

## 2019-02-15 DIAGNOSIS — I10 BENIGN ESSENTIAL HYPERTENSION: ICD-10-CM

## 2019-02-15 DIAGNOSIS — I25.118 CORONARY ARTERY DISEASE OF NATIVE ARTERY OF NATIVE HEART WITH STABLE ANGINA PECTORIS (H): ICD-10-CM

## 2019-02-15 LAB
ALT SERPL W P-5'-P-CCNC: 75 U/L (ref 0–70)
ANION GAP SERPL CALCULATED.3IONS-SCNC: 6 MMOL/L (ref 3–14)
BUN SERPL-MCNC: 20 MG/DL (ref 7–30)
CALCIUM SERPL-MCNC: 8.7 MG/DL (ref 8.5–10.1)
CHLORIDE SERPL-SCNC: 105 MMOL/L (ref 94–109)
CHOLEST SERPL-MCNC: 122 MG/DL
CO2 SERPL-SCNC: 29 MMOL/L (ref 20–32)
CREAT SERPL-MCNC: 0.84 MG/DL (ref 0.66–1.25)
GFR SERPL CREATININE-BSD FRML MDRD: >90 ML/MIN/{1.73_M2}
GLUCOSE SERPL-MCNC: 93 MG/DL (ref 70–99)
HDLC SERPL-MCNC: 32 MG/DL
LDLC SERPL CALC-MCNC: 61 MG/DL
NONHDLC SERPL-MCNC: 90 MG/DL
POTASSIUM SERPL-SCNC: 4.1 MMOL/L (ref 3.4–5.3)
SODIUM SERPL-SCNC: 140 MMOL/L (ref 133–144)
TRIGL SERPL-MCNC: 147 MG/DL

## 2019-02-15 PROCEDURE — 84460 ALANINE AMINO (ALT) (SGPT): CPT | Performed by: PHYSICIAN ASSISTANT

## 2019-02-15 PROCEDURE — 80061 LIPID PANEL: CPT | Performed by: PHYSICIAN ASSISTANT

## 2019-02-15 PROCEDURE — 36415 COLL VENOUS BLD VENIPUNCTURE: CPT | Performed by: PHYSICIAN ASSISTANT

## 2019-02-15 PROCEDURE — 99213 OFFICE O/P EST LOW 20 MIN: CPT | Performed by: FAMILY MEDICINE

## 2019-02-15 PROCEDURE — 80048 BASIC METABOLIC PNL TOTAL CA: CPT | Performed by: PHYSICIAN ASSISTANT

## 2019-02-15 PROCEDURE — 80076 HEPATIC FUNCTION PANEL: CPT | Performed by: FAMILY MEDICINE

## 2019-02-15 RX ORDER — CLOBETASOL PROPIONATE 0.5 MG/G
AEROSOL, FOAM TOPICAL
COMMUNITY
Start: 2019-02-07 | End: 2023-10-05

## 2019-02-15 ASSESSMENT — MIFFLIN-ST. JEOR: SCORE: 1827.77

## 2019-02-15 ASSESSMENT — PAIN SCALES - GENERAL: PAINLEVEL: NO PAIN (0)

## 2019-02-15 NOTE — PROGRESS NOTES
SUBJECTIVE:   Leonides Paul is a 58 year old male who presents to clinic today for the following health issues:      Test results    Leonides has a history of severe psoriasis and has been on biological infusion therapy for this for years and gets frequent lab testing to ensure safe administration. His liver enzymes have waxed and waned a but over the years, never of an extreme nature and he has transitioned to a new provider.  His last set of labs showed a very mildly elevated ALT and AST and was referred to me for follow up.  In 2010 he was on the ER for evaluation of an acute appy and his abdominal CT mentioned fatty liver change but has not had any other imaging.  He notes his alcohol consumption is limited to 2-3 drinks per week.  He denies any issues with icterus, skin lesions or itching or other signs of liver disease.  He has been screened for hepatitis on a number of occasions prior to infusion therapy and all have been negative.  He is also on high dose statin which can affect his liver tests.     Current Outpatient Medications   Medication Sig Dispense Refill     amLODIPine (NORVASC) 5 MG tablet Take 1 tablet (5 mg) by mouth daily 90 tablet 3     atorvastatin (LIPITOR) 80 MG tablet Take 1 tablet (80 mg) by mouth daily 90 tablet 2     CIALIS 10 MG tablet TAKE 1 TABLET DAILY AS     NEEDED FOR ERECTILE        DYSFUNCTION 12 tablet 5     clobetasol propionate (OLUX) 0.05 % external foam        clopidogrel (PLAVIX) 75 MG tablet Take 1 tablet (75 mg) by mouth daily 90 tablet 3     ezetimibe (ZETIA) 10 MG tablet Take 1 tablet (10 mg) by mouth daily 90 tablet 3     metoprolol succinate (TOPROL XL) 50 MG 24 hr tablet Take 1 tablet (50 mg) by mouth daily 90 tablet 3     triamcinolone (KENALOG) 0.5 % cream Apply to affected areas twice daily 30 g 2     UNABLE TO FIND MEDICATION NAME: Taltz injection 70mg once per month       ACE/ARB NOT PRESCRIBED, INTENTIONAL, by Other route continuous prn.       ASPIRIN NOT  PRESCRIBED, INTENTIONAL, by Other route continuous prn.  0     MELATONIN PO Take by mouth At Bedtime       OBJECTIVE:  /82 (Cuff Size: Adult Large)   Pulse 64   Temp 97.6  F (36.4  C) (Temporal)   Resp 12   Ht 1.829 m (6')   Wt 98 kg (216 lb)   SpO2 95%   BMI 29.29 kg/m    Alert and oriented, in no acute distress.  PERRL, EOMI, fundi are clear. No icterus. TMs, nose, throat are all normal.  The neck is supple and free of adenopathy or masses, the thyroid is normal without enlargement or nodules.  Chest is clear to auscultation.  Heart is regular without murmurs, clicks or rubs.   Abdomen - Abdomen soft, non-tender. BS normal. No masses, organomegaly.         Results for orders placed or performed in visit on 02/15/19   Basic metabolic panel   Result Value Ref Range    Sodium 140 133 - 144 mmol/L    Potassium 4.1 3.4 - 5.3 mmol/L    Chloride 105 94 - 109 mmol/L    Carbon Dioxide 29 20 - 32 mmol/L    Anion Gap 6 3 - 14 mmol/L    Glucose 93 70 - 99 mg/dL    Urea Nitrogen 20 7 - 30 mg/dL    Creatinine 0.84 0.66 - 1.25 mg/dL    GFR Estimate >90 >60 mL/min/[1.73_m2]    GFR Estimate If Black >90 >60 mL/min/[1.73_m2]    Calcium 8.7 8.5 - 10.1 mg/dL   Lipid Profile   Result Value Ref Range    Cholesterol 122 <200 mg/dL    Triglycerides 147 <150 mg/dL    HDL Cholesterol 32 (L) >39 mg/dL    LDL Cholesterol Calculated 61 <100 mg/dL    Non HDL Cholesterol 90 <130 mg/dL   ALT   Result Value Ref Range    ALT 75 (H) 0 - 70 U/L     Full liver panel is pending.   Review of panels in the past have shown minimal increases in ALT and AST off and on but never has reached a level of even 1.5 x normal range.      ASSESSMENT:   Elevation of level of transaminase or lactic acid dehydrogenase (LDH)  Coronary artery disease of native artery of native heart with stable angina pectoris (H)  Benign essential hypertension     PLAN:  Will await results of his full liver panel and if it is just a minimal ALT and/or AST increase, I think  we can safely continue to monitor this without other intervention. If other issues are apparent, imaging with US/CT/MRI will be pursued to assess for underlying liver disorders.  He is comfortable with that plan.     Electronically signed by Greg Schoen, MD

## 2019-02-16 LAB
ALBUMIN SERPL-MCNC: 4.2 G/DL (ref 3.4–5)
ALP SERPL-CCNC: 73 U/L (ref 40–150)
ALT SERPL W P-5'-P-CCNC: 80 U/L (ref 0–70)
AST SERPL W P-5'-P-CCNC: 32 U/L (ref 0–45)
BILIRUB DIRECT SERPL-MCNC: 0.2 MG/DL (ref 0–0.2)
BILIRUB SERPL-MCNC: 0.7 MG/DL (ref 0.2–1.3)
PROT SERPL-MCNC: 8.1 G/DL (ref 6.8–8.8)

## 2019-02-20 ENCOUNTER — TELEPHONE (OUTPATIENT)
Dept: CARDIOLOGY | Facility: CLINIC | Age: 59
End: 2019-02-20

## 2019-02-20 NOTE — TELEPHONE ENCOUNTER
Message from Priti-     His labs are normal. He will see Dr. Hernandez in May for annual follow up.     Priti Harris PA-C   2/19/2019

## 2019-02-20 NOTE — LETTER
February 20, 2019       TO: Leonides Paul   [address] [place] [id number]       Dear Mr. Paul,    The results of your recent were reviewed by Priti YATES Lab results ok.   Please call scheduling at [phone],  for May OV with Dr. Hernandez.     Results for orders placed or performed in visit on 02/15/19   Hepatic panel (Albumin, ALT, AST, Bili, Alk Phos, TP)   Result Value Ref Range    Bilirubin Direct 0.2 0.0 - 0.2 mg/dL    Bilirubin Total 0.7 0.2 - 1.3 mg/dL    Albumin 4.2 3.4 - 5.0 g/dL    Protein Total 8.1 6.8 - 8.8 g/dL    Alkaline Phosphatase 73 40 - 150 U/L    ALT 80 (H) 0 - 70 U/L    AST 32 0 - 45 U/L   Basic metabolic panel   Result Value Ref Range    Sodium 140 133 - 144 mmol/L    Potassium 4.1 3.4 - 5.3 mmol/L    Chloride 105 94 - 109 mmol/L    Carbon Dioxide 29 20 - 32 mmol/L    Anion Gap 6 3 - 14 mmol/L    Glucose 93 70 - 99 mg/dL    Urea Nitrogen 20 7 - 30 mg/dL    Creatinine 0.84 0.66 - 1.25 mg/dL    GFR Estimate >90 >60 mL/min/[1.73_m2]    GFR Estimate If Black >90 >60 mL/min/[1.73_m2]    Calcium 8.7 8.5 - 10.1 mg/dL   Lipid Profile   Result Value Ref Range    Cholesterol 122 <200 mg/dL    Triglycerides 147 <150 mg/dL    HDL Cholesterol 32 (L) >39 mg/dL    LDL Cholesterol Calculated 61 <100 mg/dL    Non HDL Cholesterol 90 <130 mg/dL   ALT   Result Value Ref Range    ALT 75 (H) 0 - 70 U/L             Sincerely,    [facility]

## 2019-02-22 ENCOUNTER — TELEPHONE (OUTPATIENT)
Dept: FAMILY MEDICINE | Facility: CLINIC | Age: 59
End: 2019-02-22

## 2019-02-22 NOTE — TELEPHONE ENCOUNTER
----- Message from Gregory G Schoen, MD sent at 2/21/2019  7:39 PM CST -----  Please inform Leonides that his liver test remains slightly above normal  (ALT of 80 and also 75 on same blood draw) while all other tests were normal. I would suggest that we follow the ALT in another two months.  Electronically signed by Greg Schoen, MD

## 2019-04-22 DIAGNOSIS — N52.2 DRUG-INDUCED ERECTILE DYSFUNCTION: ICD-10-CM

## 2019-04-23 RX ORDER — TADALAFIL 10 MG/1
TABLET ORAL
Qty: 12 TABLET | Refills: 5 | Status: SHIPPED | OUTPATIENT
Start: 2019-04-23 | End: 2019-12-31

## 2019-04-23 NOTE — TELEPHONE ENCOUNTER
"Cialis  Last Written Prescription Date:  08/01/2018  Last Fill Quantity: 12,  # refills: 5   Last office visit: 2/15/2019 with prescribing provider:  Schoen   Future Office Visit:   Next 5 appointments (look out 90 days)    May 07, 2019 11:00 AM CDT  Return Visit with Tay Hernandez MD  [facility] ([address] [address] [phone]      Prescription approved per [facility] Refill Protocol.    Requested Prescriptions   Pending Prescriptions Disp Refills     tadalafil (CIALIS) 10 MG tablet [Pharmacy Med Name: TADALAFIL TAB 10MG] 12 tablet 5     Sig: TAKE 1 TABLET DAILY AS     NEEDED FOR ERECTILE        DYSFUNCTION       Erectile Dysfuction Protocol Passed - 4/22/2019 10:07 AM        Passed - Absence of nitrates on medication list        Passed - Absence of Alpha Blockers on Med list        Passed - Recent (12 mo) or future (30 days) visit within the authorizing provider's specialty     Patient had office visit in the last 12 months or has a visit in the next 30 days with authorizing provider or within the authorizing provider's specialty.  See \"Patient Info\" tab in inbasket, or \"Choose Columns\" in Meds & Orders section of the refill encounter.          Passed - Medication is active on med list        Passed - Patient is age 18 or older      Didi Solorio RN   "

## 2019-04-30 ENCOUNTER — DOCUMENTATION ONLY (OUTPATIENT)
Dept: CARDIOLOGY | Facility: CLINIC | Age: 59
End: 2019-04-30

## 2019-04-30 NOTE — PROGRESS NOTES
Message from AARON Priti Harris:  Please let patient know his insurance does not cover echocardiogram. He should cancel it and we will see him in clinic with labs only and decide if we need to repeat echocardiogram down the road.     Thanks,     Priti     Spoke with patient, he will call the [place] office and cancel the echo scheduled 5/1/19. He will see Dr. Hernandez on 5/7/19.

## 2019-05-07 ENCOUNTER — OFFICE VISIT (OUTPATIENT)
Dept: CARDIOLOGY | Facility: CLINIC | Age: 59
End: 2019-05-07
Payer: COMMERCIAL

## 2019-05-07 VITALS
WEIGHT: 216.9 LBS | HEART RATE: 56 BPM | HEIGHT: 72 IN | BODY MASS INDEX: 29.38 KG/M2 | DIASTOLIC BLOOD PRESSURE: 88 MMHG | SYSTOLIC BLOOD PRESSURE: 126 MMHG | OXYGEN SATURATION: 98 %

## 2019-05-07 DIAGNOSIS — I25.118 CORONARY ARTERY DISEASE OF NATIVE ARTERY OF NATIVE HEART WITH STABLE ANGINA PECTORIS (H): Primary | ICD-10-CM

## 2019-05-07 DIAGNOSIS — E78.5 HYPERLIPIDEMIA LDL GOAL <70: ICD-10-CM

## 2019-05-07 DIAGNOSIS — I10 BENIGN ESSENTIAL HYPERTENSION: ICD-10-CM

## 2019-05-07 PROCEDURE — 99214 OFFICE O/P EST MOD 30 MIN: CPT | Performed by: INTERNAL MEDICINE

## 2019-05-07 ASSESSMENT — MIFFLIN-ST. JEOR: SCORE: 1826.85

## 2019-05-07 NOTE — PROGRESS NOTES
HPI and Plan:   See dictation    Orders Placed This Encounter   Procedures     Lipid Profile     ALT     Basic metabolic panel     Follow-Up with Cardiologist       No orders of the defined types were placed in this encounter.      There are no discontinued medications.      Encounter Diagnoses   Name Primary?     Coronary artery disease of native artery of native heart with stable angina pectoris (H) Yes     Benign essential hypertension      Hyperlipidemia LDL goal <70        CURRENT MEDICATIONS:  Current Outpatient Medications   Medication Sig Dispense Refill     ACE/ARB NOT PRESCRIBED, INTENTIONAL, by Other route continuous prn.       amLODIPine (NORVASC) 5 MG tablet Take 1 tablet (5 mg) by mouth daily 90 tablet 3     ASPIRIN NOT PRESCRIBED, INTENTIONAL, by Other route continuous prn.  0     atorvastatin (LIPITOR) 80 MG tablet Take 1 tablet (80 mg) by mouth daily 90 tablet 2     clobetasol propionate (OLUX) 0.05 % external foam        clopidogrel (PLAVIX) 75 MG tablet Take 1 tablet (75 mg) by mouth daily 90 tablet 3     ezetimibe (ZETIA) 10 MG tablet Take 1 tablet (10 mg) by mouth daily 90 tablet 3     MELATONIN PO Take by mouth At Bedtime       metoprolol succinate (TOPROL XL) 50 MG 24 hr tablet Take 1 tablet (50 mg) by mouth daily 90 tablet 3     tadalafil (CIALIS) 10 MG tablet TAKE 1 TABLET DAILY AS     NEEDED FOR ERECTILE        DYSFUNCTION 12 tablet 5     triamcinolone (KENALOG) 0.5 % cream Apply to affected areas twice daily 30 g 2     UNABLE TO FIND MEDICATION NAME: Taltz injection 70mg once per month         ALLERGIES     Allergies   Allergen Reactions     No Known Drug Allergies        PAST MEDICAL HISTORY:  Past Medical History:   Diagnosis Date     CAD (coronary artery disease) 7/19/2010     History of angina pectoris 3/1/2018     Hyperlipidemia LDL goal < 100 7/19/2010     Hypertension 7/19/2010     Other psoriasis 12/15/2005       PAST SURGICAL HISTORY:  Past Surgical History:   Procedure Laterality  Date     COLONOSCOPY  02/10/10      LAPAROSCOPY, SURGICAL; APPENDECTOMY  7/17/10     REMOVAL OF SPERM DUCT(S)         FAMILY HISTORY:  Family History   Problem Relation Age of Onset     Allergies Daughter         environment al     Allergies Son         same as sister     Alzheimer Disease Father          at age 73     Blood Disease Father      Cancer Maternal Grandfather         [place] lung -     Heart Disease Maternal Grandmother      Psychotic Disorder Brother        SOCIAL HISTORY:  Social History     Socioeconomic History     Marital status:      Spouse name: Not on file     Number of children: Not on file     Years of education: Not on file     Highest education level: Not on file   Occupational History     Not on file   Social Needs     Financial resource strain: Not on file     Food insecurity:     Worry: Not on file     Inability: Not on file     Transportation needs:     Medical: Not on file     Non-medical: Not on file   Tobacco Use     Smoking status: Former Smoker     Packs/day: 2.00     Years: 12.00     Pack years: 24.00     Smokeless tobacco: Never Used     Tobacco comment: Quit at age 25   Substance and Sexual Activity     Alcohol use: Yes     Comment: beer weekly     Drug use: No     Sexual activity: Yes     Partners: Female   Lifestyle     Physical activity:     Days per week: Not on file     Minutes per session: Not on file     Stress: Not on file   Relationships     Social connections:     Talks on phone: Not on file     Gets together: Not on file     Attends Worship service: Not on file     Active member of club or organization: Not on file     Attends meetings of clubs or organizations: Not on file     Relationship status: Not on file     Intimate partner violence:     Fear of current or ex partner: Not on file     Emotionally abused: Not on file     Physically abused: Not on file     Forced sexual activity: Not on file   Other Topics Concern     Parent/sibling w/ CABG, MI  or angioplasty before 65F 55M? Not Asked   Social History Narrative     Not on file       Review of Systems:  Skin:  Negative       Eyes:  Negative      ENT:  Negative      Respiratory:  Negative       Cardiovascular:  Negative for;palpitations;edema;lightheadedness;dizziness fatigue;Positive for;chest pain no change, chest pain with over exertion  Gastroenterology: Negative      Genitourinary:  Negative      Musculoskeletal:  Positive for arthritis    Neurologic:  Negative      Psychiatric:  Positive for sleep disturbances    Heme/Lymph/Imm:  Negative      Endocrine:  Negative        Physical Exam:  Vitals: /88 (BP Location: Right arm, Patient Position: Fowlers, Cuff Size: Adult Regular)   Pulse 56   Ht 1.829 m (6')   Wt 98.4 kg (216 lb 14.4 oz)   SpO2 98%   BMI 29.42 kg/m      Constitutional:  cooperative, alert and oriented, well developed, well nourished, in no acute distress        Skin:  warm and dry to the touch, no apparent skin lesions or masses noted          Head:  normocephalic, no masses or lesions        Eyes:           Lymph:      ENT:  no pallor or cyanosis, dentition good        Neck:           Respiratory:  normal breath sounds, clear to auscultation, normal A-P diameter, normal symmetry, normal respiratory excursion, no use of accessory muscles;clear to auscultation         Cardiac: regular rhythm, normal S1/S2, no S3 or S4, apical impulse not displaced, no murmurs, gallops or rubs                pulses full and equal, no bruits auscultated                                        GI:  abdomen soft, non-tender, BS normoactive, no mass, no HSM, no bruits        Extremities and Muscular Skeletal:  no deformities, clubbing, cyanosis, erythema observed;no edema              Neurological:           Psych:           CC  No referring provider defined for this encounter.

## 2019-05-07 NOTE — LETTER
5/7/2019      Gregory G. Schoen, MD  [address] [place] [address]      RE: Leonides Paul       Dear Colleague,    I had the pleasure of seeing Leonides Paul in the [facility].    Service Date: 05/07/2019      HISTORY OF PRESENT ILLNESS:  I had the opportunity to see Mr. Leonides Paul in Cardiology Clinic today at the [facility] in [place] for reevaluation of coronary artery disease and chronic stable exertional angina.        Mr. Paul began having coronary artery disease issues about 18 years ago.  We initially stented his LAD, but due to restenosis issues, it became chronically occluded with good distal collaterals to the remainder of the LAD territory.  As a result, he has had chronic exertional angina but has been very stable over the years.  He still occasionally has some chest discomfort with walking or more vigorous exercise, especially in the cold air.  He generally is able to do all of his activities without any discomfort.  He does notice it occasionally.  It does not stop him from playing 18 holes and walking the golf course.      He has no shortness of breath, PND, orthopnea or other concerning cardiac symptoms.      His cholesterol numbers remain excellent with an LDL of 61 and triglycerides 147, but his HDL remains low at 32.      PHYSICAL EXAMINATION:  His blood pressure is 126/88, heart rate 56 and weight 216 pounds.  His lungs are clear.  Heart rhythm is regular.  There are no cardiac murmurs or carotid bruits.      IMPRESSIONS:  Mr. Leonides Paul is a 59-year-old gentleman with chronic ischemic coronary artery disease due to an occluded mid LAD with good distal collaterals.  We are managing that medically and he seems to be quite stable.  I tried to pin him down about whether his symptoms might be occurring more frequently, but we decided in the end that his symptoms are stable and have not really changed much in the  last few years.  I suggested that he get back out on the golf course and let me know if he starts having more symptoms in which case we would need to examine this issue further to look for a new artery blockages that might be causing symptoms.  At this point, he seems to be quite stable and his risk factors are under excellent control.  I will not make any medication changes.      cc:      Gregory Schoen, [place] [address]         MENDOZA BATES MD, [facility]             D: 2019   T: 2019   MT:       Name:     JIM FERNANDEZ   MRN:      [id number]        Account:      [id number]   :      1960           Service Date: 2019      Document: [id number]         Outpatient Encounter Medications as of 2019   Medication Sig Dispense Refill     ACE/ARB NOT PRESCRIBED, INTENTIONAL, by Other route continuous prn.       amLODIPine (NORVASC) 5 MG tablet Take 1 tablet (5 mg) by mouth daily 90 tablet 3     ASPIRIN NOT PRESCRIBED, INTENTIONAL, by Other route continuous prn.  0     atorvastatin (LIPITOR) 80 MG tablet Take 1 tablet (80 mg) by mouth daily 90 tablet 2     clobetasol propionate (OLUX) 0.05 % external foam        clopidogrel (PLAVIX) 75 MG tablet Take 1 tablet (75 mg) by mouth daily 90 tablet 3     ezetimibe (ZETIA) 10 MG tablet Take 1 tablet (10 mg) by mouth daily 90 tablet 3     MELATONIN PO Take by mouth At Bedtime       metoprolol succinate (TOPROL XL) 50 MG 24 hr tablet Take 1 tablet (50 mg) by mouth daily 90 tablet 3     tadalafil (CIALIS) 10 MG tablet TAKE 1 TABLET DAILY AS     NEEDED FOR ERECTILE        DYSFUNCTION 12 tablet 5     triamcinolone (KENALOG) 0.5 % cream Apply to affected areas twice daily 30 g 2     UNABLE TO FIND MEDICATION NAME: Taltz injection 70mg once per month       No facility-administered encounter medications on file as of 2019.        Again, thank you for allowing me to participate in the care of your  patient.      Sincerely,    MENDOZA BATES MD     [facility]

## 2019-05-07 NOTE — PROGRESS NOTES
Service Date: 05/07/2019      HISTORY OF PRESENT ILLNESS:  I had the opportunity to see Mr. Leonides Paul in Cardiology Clinic today at the [facility] in [place] for reevaluation of coronary artery disease and chronic stable exertional angina.        Mr. Paul began having coronary artery disease issues about 18 years ago.  We initially stented his LAD, but due to restenosis issues, it became chronically occluded with good distal collaterals to the remainder of the LAD territory.  As a result, he has had chronic exertional angina but has been very stable over the years.  He still occasionally has some chest discomfort with walking or more vigorous exercise, especially in the cold air.  He generally is able to do all of his activities without any discomfort.  He does notice it occasionally.  It does not stop him from playing 18 holes and walking the golf course.      He has no shortness of breath, PND, orthopnea or other concerning cardiac symptoms.      His cholesterol numbers remain excellent with an LDL of 61 and triglycerides 147, but his HDL remains low at 32.      PHYSICAL EXAMINATION:  His blood pressure is 126/88, heart rate 56 and weight 216 pounds.  His lungs are clear.  Heart rhythm is regular.  There are no cardiac murmurs or carotid bruits.      IMPRESSIONS:  Mr. Leonides Paul is a 59-year-old gentleman with chronic ischemic coronary artery disease due to an occluded mid LAD with good distal collaterals.  We are managing that medically and he seems to be quite stable.  I tried to pin him down about whether his symptoms might be occurring more frequently, but we decided in the end that his symptoms are stable and have not really changed much in the last few years.  I suggested that he get back out on the golf course and let me know if he starts having more symptoms in which case we would need to examine this issue further to look for a new artery blockages that might be  causing symptoms.  At this point, he seems to be quite stable and his risk factors are under excellent control.  I will not make any medication changes.      cc:      Gregory Sch[place], [place] [address]         MENDOZA BATES MD, [facility]             D: 2019   T: 2019   MT:       Name:     JIM FERNANDEZ   MRN:      [id number]        Account:      [id number]   :      1960           Service Date: 2019      Document: [id number]

## 2019-05-07 NOTE — LETTER
5/7/2019    Gregory G. Schoen, MD  [address] [place] [address]    RE: Leonides Paul       Dear Colleague,    I had the [facility] of seeing Leon[facility] in [facility].    HPI and Plan:   See dictation    Orders Placed This Encounter   Procedures     Lipid Profile     ALT     Basic metabolic panel     Follow-Up with Cardiologist       No orders of the defined types were placed in this encounter.      There are no discontinued medications.      Encounter Diagnoses   Name Primary?     Coronary artery disease of native artery of native heart with stable angina pectoris (H) Yes     Benign essential hypertension      Hyperlipidemia LDL goal <70        CURRENT MEDICATIONS:  Current Outpatient Medications   Medication Sig Dispense Refill     ACE/ARB NOT PRESCRIBED, INTENTIONAL, by Other route continuous prn.       amLODIPine (NORVASC) 5 MG tablet Take 1 tablet (5 mg) by mouth daily 90 tablet 3     ASPIRIN NOT PRESCRIBED, INTENTIONAL, by Other route continuous prn.  0     atorvastatin (LIPITOR) 80 MG tablet Take 1 tablet (80 mg) by mouth daily 90 tablet 2     clobetasol propionate (OLUX) 0.05 % external foam        clopidogrel (PLAVIX) 75 MG tablet Take 1 tablet (75 mg) by mouth daily 90 tablet 3     ezetimibe (ZETIA) 10 MG tablet Take 1 tablet (10 mg) by mouth daily 90 tablet 3     MELATONIN PO Take by mouth At Bedtime       metoprolol succinate (TOPROL XL) 50 MG 24 hr tablet Take 1 tablet (50 mg) by mouth daily 90 tablet 3     tadalafil (CIALIS) 10 MG tablet TAKE 1 TABLET DAILY AS     NEEDED FOR ERECTILE        DYSFUNCTION 12 tablet 5     triamcinolone (KENALOG) 0.5 % cream Apply to affected areas twice daily 30 g 2     UNABLE TO FIND MEDICATION NAME: Taltz injection 70mg once per month         ALLERGIES     Allergies   Allergen Reactions     No Known Drug Allergies        PAST MEDICAL HISTORY:  Past Medical History:   Diagnosis Date     CAD (coronary artery disease)  2010     History of angina pectoris 3/1/2018     Hyperlipidemia LDL goal < 100 2010     Hypertension 2010     Other psoriasis 12/15/2005       PAST SURGICAL HISTORY:  Past Surgical History:   Procedure Laterality Date     COLONOSCOPY  02/10/10     HC LAPAROSCOPY, SURGICAL; APPENDECTOMY  7/17/10     REMOVAL OF SPERM DUCT(S)         FAMILY HISTORY:  Family History   Problem Relation Age of Onset     Allergies Daughter         environment al     Allergies Son         same as sister     Alzheimer Disease Father          at age 73     Blood Disease Father      Cancer Maternal Grandfather         [place] lung -     Heart Disease Maternal Grandmother      Psychotic Disorder Brother        SOCIAL HISTORY:  Social History     Socioeconomic History     Marital status:      Spouse name: Not on file     Number of children: Not on file     Years of education: Not on file     Highest education level: Not on file   Occupational History     Not on file   Social Needs     Financial resource strain: Not on file     Food insecurity:     Worry: Not on file     Inability: Not on file     Transportation needs:     Medical: Not on file     Non-medical: Not on file   Tobacco Use     Smoking status: Former Smoker     Packs/day: 2.00     Years: 12.00     Pack years: 24.00     Smokeless tobacco: Never Used     Tobacco comment: Quit at age 25   Substance and Sexual Activity     Alcohol use: Yes     Comment: beer weekly     Drug use: No     Sexual activity: Yes     Partners: Female   Lifestyle     Physical activity:     Days per week: Not on file     Minutes per session: Not on file     Stress: Not on file   Relationships     Social connections:     Talks on phone: Not on file     Gets together: Not on file     Attends Catholic service: Not on file     Active member of club or organization: Not on file     Attends meetings of clubs or organizations: Not on file     Relationship status: Not on file     Intimate  partner violence:     Fear of current or ex partner: Not on file     Emotionally abused: Not on file     Physically abused: Not on file     Forced sexual activity: Not on file   Other Topics Concern     Parent/sibling w/ CABG, MI or angioplasty before 65F 55M? Not Asked   Social History Narrative     Not on file       Review of Systems:  Skin:  Negative       Eyes:  Negative      ENT:  Negative      Respiratory:  Negative       Cardiovascular:  Negative for;palpitations;edema;lightheadedness;dizziness fatigue;Positive for;chest pain no change, chest pain with over exertion  Gastroenterology: Negative      Genitourinary:  Negative      Musculoskeletal:  Positive for arthritis    Neurologic:  Negative      Psychiatric:  Positive for sleep disturbances    Heme/Lymph/Imm:  Negative      Endocrine:  Negative        Physical Exam:  Vitals: /88 (BP Location: Right arm, Patient Position: Fowlers, Cuff Size: Adult Regular)   Pulse 56   Ht 1.829 m (6')   Wt 98.4 kg (216 lb 14.4 oz)   SpO2 98%   BMI 29.42 kg/m       Constitutional:  cooperative, alert and oriented, well developed, well nourished, in no acute distress        Skin:  warm and dry to the touch, no apparent skin lesions or masses noted          Head:  normocephalic, no masses or lesions        Eyes:           Lymph:      ENT:  no pallor or cyanosis, dentition good        Neck:           Respiratory:  normal breath sounds, clear to auscultation, normal A-P diameter, normal symmetry, normal respiratory excursion, no use of accessory muscles;clear to auscultation         Cardiac: regular rhythm, normal S1/S2, no S3 or S4, apical impulse not displaced, no murmurs, gallops or rubs                pulses full and equal, no bruits auscultated                                        GI:  abdomen soft, non-tender, BS normoactive, no mass, no HSM, no bruits        Extremities and Muscular Skeletal:  no deformities, clubbing, cyanosis, erythema observed;no edema               Neurological:           Psych:           CC  No referring provider defined for this encounter.                Thank you for allowing me to participate in the care of your patient.      Sincerely,     MENDOZA BATES MD     [facility]    cc:   No referring provider defined for this encounter.

## 2019-05-10 DIAGNOSIS — Z79.899 HIGH RISK MEDICATION USE: Primary | ICD-10-CM

## 2019-05-10 DIAGNOSIS — L40.0 PLAQUE PSORIASIS: ICD-10-CM

## 2019-05-10 LAB
ALBUMIN SERPL-MCNC: 4.2 G/DL (ref 3.4–5)
ALP SERPL-CCNC: 78 U/L (ref 40–150)
ALT SERPL W P-5'-P-CCNC: 99 U/L (ref 0–70)
ANION GAP SERPL CALCULATED.3IONS-SCNC: 2 MMOL/L (ref 3–14)
AST SERPL W P-5'-P-CCNC: 32 U/L (ref 0–45)
BASOPHILS # BLD AUTO: 0 10E9/L (ref 0–0.2)
BASOPHILS NFR BLD AUTO: 0.2 %
BILIRUB SERPL-MCNC: 0.7 MG/DL (ref 0.2–1.3)
BUN SERPL-MCNC: 17 MG/DL (ref 7–30)
CALCIUM SERPL-MCNC: 8.6 MG/DL (ref 8.5–10.1)
CHLORIDE SERPL-SCNC: 104 MMOL/L (ref 94–109)
CO2 SERPL-SCNC: 34 MMOL/L (ref 20–32)
CREAT SERPL-MCNC: 1.05 MG/DL (ref 0.66–1.25)
DIFFERENTIAL METHOD BLD: NORMAL
EOSINOPHIL NFR BLD AUTO: 1.5 %
ERYTHROCYTE [DISTWIDTH] IN BLOOD BY AUTOMATED COUNT: 12.7 % (ref 10–15)
GFR SERPL CREATININE-BSD FRML MDRD: 76 ML/MIN/{1.73_M2}
GLUCOSE SERPL-MCNC: 86 MG/DL (ref 70–99)
HCT VFR BLD AUTO: 45.3 % (ref 40–53)
HGB BLD-MCNC: 16.1 G/DL (ref 13.3–17.7)
IMM GRANULOCYTES # BLD: 0 10E9/L (ref 0–0.4)
IMM GRANULOCYTES NFR BLD: 0.3 %
LYMPHOCYTES # BLD AUTO: 3.3 10E9/L (ref 0.8–5.3)
LYMPHOCYTES NFR BLD AUTO: 49.6 %
MCH RBC QN AUTO: 32.2 PG (ref 26.5–33)
MCHC RBC AUTO-ENTMCNC: 35.5 G/DL (ref 31.5–36.5)
MCV RBC AUTO: 91 FL (ref 78–100)
MONOCYTES # BLD AUTO: 0.6 10E9/L (ref 0–1.3)
MONOCYTES NFR BLD AUTO: 9.6 %
NEUTROPHILS # BLD AUTO: 2.6 10E9/L (ref 1.6–8.3)
NEUTROPHILS NFR BLD AUTO: 38.8 %
NRBC # BLD AUTO: 0 10*3/UL
NRBC BLD AUTO-RTO: 0 /100
PLATELET # BLD AUTO: 216 10E9/L (ref 150–450)
POTASSIUM SERPL-SCNC: 3.9 MMOL/L (ref 3.4–5.3)
PROT SERPL-MCNC: 8.1 G/DL (ref 6.8–8.8)
RBC # BLD AUTO: 5 10E12/L (ref 4.4–5.9)
SODIUM SERPL-SCNC: 140 MMOL/L (ref 133–144)
WBC # BLD AUTO: 6.6 10E9/L (ref 4–11)

## 2019-05-10 PROCEDURE — 36415 COLL VENOUS BLD VENIPUNCTURE: CPT | Performed by: DERMATOLOGY

## 2019-05-10 PROCEDURE — 80053 COMPREHEN METABOLIC PANEL: CPT | Performed by: DERMATOLOGY

## 2019-05-10 PROCEDURE — 85025 COMPLETE CBC W/AUTO DIFF WBC: CPT | Performed by: DERMATOLOGY

## 2019-05-13 DIAGNOSIS — E78.5 HYPERLIPIDEMIA LDL GOAL <70: ICD-10-CM

## 2019-05-13 RX ORDER — EZETIMIBE 10 MG/1
10 TABLET ORAL DAILY
Qty: 90 TABLET | Refills: 3 | Status: SHIPPED | OUTPATIENT
Start: 2019-05-13 | End: 2019-05-14

## 2019-05-14 DIAGNOSIS — E78.5 HYPERLIPIDEMIA LDL GOAL <70: ICD-10-CM

## 2019-05-14 RX ORDER — EZETIMIBE 10 MG/1
10 TABLET ORAL DAILY
Qty: 90 TABLET | Refills: 3 | Status: SHIPPED | OUTPATIENT
Start: 2019-05-14 | End: 2020-05-07

## 2019-06-27 DIAGNOSIS — I25.10 CORONARY ARTERY DISEASE INVOLVING NATIVE CORONARY ARTERY OF NATIVE HEART WITHOUT ANGINA PECTORIS: ICD-10-CM

## 2019-06-27 DIAGNOSIS — E78.5 HYPERLIPIDEMIA LDL GOAL <70: ICD-10-CM

## 2019-06-27 RX ORDER — METOPROLOL SUCCINATE 50 MG/1
50 TABLET, EXTENDED RELEASE ORAL DAILY
Qty: 90 TABLET | Refills: 3 | Status: SHIPPED | OUTPATIENT
Start: 2019-06-27 | End: 2020-06-10

## 2019-06-27 RX ORDER — CLOPIDOGREL BISULFATE 75 MG/1
75 TABLET ORAL DAILY
Qty: 90 TABLET | Refills: 3 | Status: SHIPPED | OUTPATIENT
Start: 2019-06-27 | End: 2020-06-10

## 2019-06-27 RX ORDER — ATORVASTATIN CALCIUM 80 MG/1
80 TABLET, FILM COATED ORAL DAILY
Qty: 90 TABLET | Refills: 3 | Status: SHIPPED | OUTPATIENT
Start: 2019-06-27 | End: 2020-06-10

## 2019-09-16 DIAGNOSIS — I10 BENIGN ESSENTIAL HYPERTENSION: ICD-10-CM

## 2019-09-16 RX ORDER — AMLODIPINE BESYLATE 5 MG/1
5 TABLET ORAL DAILY
Qty: 90 TABLET | Refills: 2 | Status: SHIPPED | OUTPATIENT
Start: 2019-09-16 | End: 2020-05-27

## 2019-11-07 ENCOUNTER — IMMUNIZATION (OUTPATIENT)
Dept: FAMILY MEDICINE | Facility: CLINIC | Age: 59
End: 2019-11-07
Payer: COMMERCIAL

## 2019-11-07 ENCOUNTER — MEDICAL CORRESPONDENCE (OUTPATIENT)
Dept: HEALTH INFORMATION MANAGEMENT | Facility: CLINIC | Age: 59
End: 2019-11-07

## 2019-11-07 DIAGNOSIS — L40.0 PLAQUE PSORIASIS: ICD-10-CM

## 2019-11-07 DIAGNOSIS — Z79.899 DRUG THERAPY: Primary | ICD-10-CM

## 2019-11-07 LAB
ALBUMIN SERPL-MCNC: 4.2 G/DL (ref 3.4–5)
ALP SERPL-CCNC: 72 U/L (ref 40–150)
ALT SERPL W P-5'-P-CCNC: 53 U/L (ref 0–70)
ANION GAP SERPL CALCULATED.3IONS-SCNC: 2 MMOL/L (ref 3–14)
AST SERPL W P-5'-P-CCNC: 19 U/L (ref 0–45)
BASOPHILS # BLD AUTO: 0 10E9/L (ref 0–0.2)
BASOPHILS NFR BLD AUTO: 0.2 %
BILIRUB SERPL-MCNC: 0.7 MG/DL (ref 0.2–1.3)
BUN SERPL-MCNC: 16 MG/DL (ref 7–30)
CALCIUM SERPL-MCNC: 8.7 MG/DL (ref 8.5–10.1)
CHLORIDE SERPL-SCNC: 105 MMOL/L (ref 94–109)
CO2 SERPL-SCNC: 32 MMOL/L (ref 20–32)
CREAT SERPL-MCNC: 0.82 MG/DL (ref 0.66–1.25)
DIFFERENTIAL METHOD BLD: NORMAL
EOSINOPHIL NFR BLD AUTO: 2.1 %
ERYTHROCYTE [DISTWIDTH] IN BLOOD BY AUTOMATED COUNT: 12.6 % (ref 10–15)
GFR SERPL CREATININE-BSD FRML MDRD: >90 ML/MIN/{1.73_M2}
GLUCOSE SERPL-MCNC: 94 MG/DL (ref 70–99)
HCT VFR BLD AUTO: 43.9 % (ref 40–53)
HGB BLD-MCNC: 15.7 G/DL (ref 13.3–17.7)
IMM GRANULOCYTES # BLD: 0 10E9/L (ref 0–0.4)
IMM GRANULOCYTES NFR BLD: 0.2 %
LYMPHOCYTES # BLD AUTO: 2.8 10E9/L (ref 0.8–5.3)
LYMPHOCYTES NFR BLD AUTO: 44.3 %
MCH RBC QN AUTO: 32.8 PG (ref 26.5–33)
MCHC RBC AUTO-ENTMCNC: 35.8 G/DL (ref 31.5–36.5)
MCV RBC AUTO: 92 FL (ref 78–100)
MONOCYTES # BLD AUTO: 0.5 10E9/L (ref 0–1.3)
MONOCYTES NFR BLD AUTO: 8.2 %
NEUTROPHILS # BLD AUTO: 2.8 10E9/L (ref 1.6–8.3)
NEUTROPHILS NFR BLD AUTO: 45 %
NRBC # BLD AUTO: 0 10*3/UL
NRBC BLD AUTO-RTO: 0 /100
PLATELET # BLD AUTO: 209 10E9/L (ref 150–450)
POTASSIUM SERPL-SCNC: 4.1 MMOL/L (ref 3.4–5.3)
PROT SERPL-MCNC: 8 G/DL (ref 6.8–8.8)
RBC # BLD AUTO: 4.79 10E12/L (ref 4.4–5.9)
SODIUM SERPL-SCNC: 139 MMOL/L (ref 133–144)
WBC # BLD AUTO: 6.3 10E9/L (ref 4–11)

## 2019-11-07 PROCEDURE — 85025 COMPLETE CBC W/AUTO DIFF WBC: CPT | Performed by: ANESTHESIOLOGY

## 2019-11-07 PROCEDURE — 90471 IMMUNIZATION ADMIN: CPT

## 2019-11-07 PROCEDURE — 36415 COLL VENOUS BLD VENIPUNCTURE: CPT | Performed by: ANESTHESIOLOGY

## 2019-11-07 PROCEDURE — 90682 RIV4 VACC RECOMBINANT DNA IM: CPT

## 2019-11-07 PROCEDURE — 86481 TB AG RESPONSE T-CELL SUSP: CPT | Performed by: ANESTHESIOLOGY

## 2019-11-07 PROCEDURE — 80053 COMPREHEN METABOLIC PANEL: CPT | Performed by: ANESTHESIOLOGY

## 2019-11-11 LAB
GAMMA INTERFERON BACKGROUND BLD IA-ACNC: 0.01 IU/ML
M TB IFN-G BLD-IMP: NEGATIVE
M TB IFN-G CD4+ BCKGRND COR BLD-ACNC: 9.23 IU/ML
MITOGEN IGNF BCKGRD COR BLD-ACNC: 0 IU/ML
MITOGEN IGNF BCKGRD COR BLD-ACNC: 0 IU/ML

## 2019-12-31 DIAGNOSIS — N52.2 DRUG-INDUCED ERECTILE DYSFUNCTION: ICD-10-CM

## 2019-12-31 NOTE — TELEPHONE ENCOUNTER
Reason for Call:  Medication or medication refill:    Do you use a [facility]?  Name of the pharmacy and phone number for the current request:  [facility]    Name of the medication requested: tadalafil (CIALIS) 10 MG tablet    Other request: would like to get more than 12 at a time.     Can we leave a detailed message on this number? YES    Phone number patient can be reached at: Home number on file [phone] (home)    Best Time: any     Call taken on 12/31/2019 at 9:42 AM by Kylie Upton

## 2020-01-02 RX ORDER — TADALAFIL 10 MG/1
TABLET ORAL
Qty: 12 TABLET | Refills: 5 | Status: SHIPPED | OUTPATIENT
Start: 2020-01-02 | End: 2020-06-10

## 2020-05-04 ENCOUNTER — VIRTUAL VISIT (OUTPATIENT)
Dept: CARDIOLOGY | Facility: CLINIC | Age: 60
End: 2020-05-04
Attending: INTERNAL MEDICINE
Payer: COMMERCIAL

## 2020-05-04 VITALS — BODY MASS INDEX: 28.48 KG/M2 | DIASTOLIC BLOOD PRESSURE: 79 MMHG | WEIGHT: 210 LBS | SYSTOLIC BLOOD PRESSURE: 127 MMHG

## 2020-05-04 DIAGNOSIS — I20.89 STABLE ANGINA PECTORIS (H): ICD-10-CM

## 2020-05-04 DIAGNOSIS — I10 BENIGN ESSENTIAL HYPERTENSION: ICD-10-CM

## 2020-05-04 DIAGNOSIS — E78.5 HYPERLIPIDEMIA LDL GOAL <70: ICD-10-CM

## 2020-05-04 DIAGNOSIS — I25.118 CORONARY ARTERY DISEASE OF NATIVE ARTERY OF NATIVE HEART WITH STABLE ANGINA PECTORIS (H): Primary | ICD-10-CM

## 2020-05-04 PROCEDURE — 99213 OFFICE O/P EST LOW 20 MIN: CPT | Mod: 95 | Performed by: INTERNAL MEDICINE

## 2020-05-04 NOTE — PROGRESS NOTES
"Leonides Paul is a 60 year old male who is being evaluated via a billable video visit.      The patient has been notified of following:     \"This video visit will be conducted via a call between you and your physician/provider. We have found that certain health care needs can be provided without the need for an in-person physical exam.  This service lets us provide the care you need with a video conversation.  If a prescription is necessary we can send it directly to your pharmacy.  If lab work is needed we can place an order for that and you can then stop by our lab to have the test done at a later time.    Video visits are billed at different rates depending on your insurance coverage.  Please reach out to your insurance provider with any questions.    If during the course of the call the physician/provider feels a video visit is not appropriate, you will not be charged for this service.\"    Patient has given verbal consent for Video visit? YES    How would you like to obtain your AVS? Mail a copy    Patient would like the video invitation sent by: Text to cell phone: [phone]    Will anyone else be joining your video visit? No     Review Of Systems  Skin: psoriasis  Eyes:Ears/Nose/Throat: NEGATIVE  Respiratory: NEGATIVE  Cardiovascular:NEGATIVE  Gastrointestinal: NEGATIVE  Genitourinary:NEGATIVE   Musculoskeletal: NEGATIVE  Neurologic: NEGATIVE  Psychiatric: NEGATIVE  Hematologic/Lymphatic/Immunologic: NEGATIVE  Endocrine:  NEGATIVE  Vitals: patient reports /79 No pulse taken today. Weight 210lbs        Video-Visit Details    Type of service:  Video Visit    Video Start Time: 4:15 PM  Video End Time: 4:35 PM    Originating Location (pt. Location): Home    Distant Location (provider location):  [facility]     Platform used for Video Visit: Meera Springer LPN    HPI and Plan:       I had the opportunity to do a video visit with Mr. Leonides Paul in " Cardiology Clinic today at the [facility] in [place] for reevaluation of coronary artery disease and chronic stable exertional angina.        Mr. Paul began having coronary artery disease issues about 19 years ago.  We initially stented his LAD, but due to restenosis issues, it became chronically occluded with good distal collaterals to the remainder of the LAD territory.  As a result, he has had chronic exertional angina but has been very stable over the years.  He still occasionally has some chest discomfort with walking or more vigorous exercise, especially in the cold air.  He generally is able to do all of his activities without any discomfort.  He does notice it occasionally. He is looking forward to getting back on the golf course but has been having a sore right shoulder. He believes that his exertional chest pain is stable and unchanged for years.       He has no shortness of breath, PND, orthopnea or other concerning cardiac symptoms.      Impression:    Leonides has chronic stable exertional angina due to distal LAD occlusion with collaterals. His symptoms are stable. BP and HR are good. I will not make any med changes. Labs tomorrow. Follow up with me next year.     Tay Hernandez MD           Orders Placed This Encounter   Procedures     Basic metabolic panel     Lipid Profile     ALT     Basic metabolic panel     Lipid Profile     ALT     Follow-Up with Cardiologist     No orders of the defined types were placed in this encounter.    There are no discontinued medications.      Encounter Diagnoses   Name Primary?     Coronary artery disease of native artery of native heart with stable angina pectoris (H) Yes     Benign essential hypertension      Hyperlipidemia LDL goal <70      Stable angina pectoris (H)        CURRENT MEDICATIONS:  Current Outpatient Medications   Medication Sig Dispense Refill     ACE/ARB NOT PRESCRIBED, INTENTIONAL, by Other route continuous prn.        amLODIPine (NORVASC) 5 MG tablet Take 1 tablet (5 mg) by mouth daily 90 tablet 2     atorvastatin (LIPITOR) 80 MG tablet Take 1 tablet (80 mg) by mouth daily 90 tablet 3     clobetasol propionate (OLUX) 0.05 % external foam        clopidogrel (PLAVIX) 75 MG tablet Take 1 tablet (75 mg) by mouth daily 90 tablet 3     ezetimibe (ZETIA) 10 MG tablet Take 1 tablet (10 mg) by mouth daily 90 tablet 3     metoprolol succinate ER (TOPROL XL) 50 MG 24 hr tablet Take 1 tablet (50 mg) by mouth daily 90 tablet 3     tadalafil (CIALIS) 10 MG tablet TAKE 1 TABLET DAILY AS     NEEDED FOR ERECTILE        DYSFUNCTION 12 tablet 5     triamcinolone (KENALOG) 0.5 % cream Apply to affected areas twice daily 30 g 2     UNABLE TO FIND MEDICATION NAME: Taltz injection 70mg once per month       ASPIRIN NOT PRESCRIBED, INTENTIONAL, by Other route continuous prn.  0     MELATONIN PO Take by mouth At Bedtime         ALLERGIES     Allergies   Allergen Reactions     No Known Drug Allergies        PAST MEDICAL HISTORY:  Past Medical History:   Diagnosis Date     CAD (coronary artery disease) 2010     History of angina pectoris 3/1/2018     Hyperlipidemia LDL goal < 100 2010     Hypertension 2010     Other psoriasis 12/15/2005       PAST SURGICAL HISTORY:  Past Surgical History:   Procedure Laterality Date     COLONOSCOPY  02/10/10     HC LAPAROSCOPY, SURGICAL; APPENDECTOMY  7/17/10     REMOVAL OF SPERM DUCT(S)         FAMILY HISTORY:  Family History   Problem Relation Age of Onset     Allergies Daughter         environment al     Allergies Son         same as sister     Alzheimer Disease Father          at age 73     Blood Disease Father      Cancer Maternal Grandfather         [place] lung -     Heart Disease Maternal Grandmother      Psychotic Disorder Brother        SOCIAL HISTORY:  Social History     Socioeconomic History     Marital status:      Spouse name: None     Number of children: None     Years of  education: None     Highest education level: None   Occupational History     None   Social Needs     Financial resource strain: None     Food insecurity     Worry: None     Inability: None     Transportation needs     Medical: None     Non-medical: None   Tobacco Use     Smoking status: Former Smoker     Packs/day: 2.00     Years: 12.00     Pack years: 24.00     Smokeless tobacco: Never Used     Tobacco comment: Quit at age 25   Substance and Sexual Activity     Alcohol use: Yes     Comment: beer weekly     Drug use: No     Sexual activity: Yes     Partners: Female   Lifestyle     Physical activity     Days per week: None     Minutes per session: None     Stress: None   Relationships     Social connections     Talks on phone: None     Gets together: None     Attends Religion service: None     Active member of club or organization: None     Attends meetings of clubs or organizations: None     Relationship status: None     Intimate partner violence     Fear of current or ex partner: None     Emotionally abused: None     Physically abused: None     Forced sexual activity: None   Other Topics Concern     Parent/sibling w/ CABG, MI or angioplasty before 65F 55M? Not Asked   Social History Narrative     None       Physical Exam:  Vitals: /79   Wt 95.3 kg (210 lb)   BMI 28.48 kg/m      Constitutional:           Skin:             Head:           Eyes:           Lymph:      ENT:           Neck:           Respiratory:            Cardiac:                                                           GI:           Extremities and Muscular Skeletal:                 Neurological:           Psych:         Recent Lab Results:  LIPID RESULTS:  Lab Results   Component Value Date    CHOL 122 02/15/2019    HDL 32 (L) 02/15/2019    LDL 61 02/15/2019    TRIG 147 02/15/2019    CHOLHDLRATIO 2.8 05/04/2015       LIVER ENZYME RESULTS:  Lab Results   Component Value Date    AST 19 11/07/2019    ALT 53 11/07/2019       CBC RESULTS:  Lab  Results   Component Value Date    WBC 6.3 11/07/2019    RBC 4.79 11/07/2019    HGB 15.7 11/07/2019    HCT 43.9 11/07/2019    MCV 92 11/07/2019    MCH 32.8 11/07/2019    MCHC 35.8 11/07/2019    RDW 12.6 11/07/2019     11/07/2019       BMP RESULTS:  Lab Results   Component Value Date     11/07/2019    POTASSIUM 4.1 11/07/2019    CHLORIDE 105 11/07/2019    CO2 32 11/07/2019    ANIONGAP 2 (L) 11/07/2019    GLC 94 11/07/2019    BUN 16 11/07/2019    CR 0.82 11/07/2019    GFRESTIMATED >90 11/07/2019    GFRESTBLACK >90 11/07/2019    JUAN JOSE 8.7 11/07/2019        A1C RESULTS:  No results found for: A1C    INR RESULTS:  No results found for: INR        CC  Tay Hernandez MD  [address]  [place] [address]

## 2020-05-04 NOTE — LETTER
5/4/2020      RE: Leonides Paul  [address] [address] [place] [employer] [place] [employer] [place] [facility] [address]       Dear Colleague,    Thank you for the opportunity to participate in the care of your patient, Leonides Paul, at the [facility]. Please see a copy of my visit note below.    I had the opportunity to do a video visit with Mr. Leonides Paul in Cardiology Clinic today at the [facility] for reevaluation of coronary artery disease and chronic stable exertional angina.        Mr. Paul began having coronary artery disease issues about 19 years ago.  We initially stented his LAD, but due to restenosis issues, it became chronically occluded with good distal collaterals to the remainder of the LAD territory.  As a result, he has had chronic exertional angina but has been very stable over the years.  He still occasionally has some chest discomfort with walking or more vigorous exercise, especially in the cold air.  He generally is able to do all of his activities without any discomfort.  He does notice it occasionally. He is looking forward to getting back on the golf course but has been having a sore right shoulder. He believes that his exertional chest pain is stable and unchanged for years.       He has no shortness of breath, PND, orthopnea or other concerning cardiac symptoms.      Impression:    Leonides has chronic stable exertional angina due to distal LAD occlusion with collaterals. His symptoms are stable. BP and HR are good. I will not make any med changes. Labs tomorrow. Follow up with me next year.     Please do not hesitate to contact me if you have any questions/concerns.     Sincerely,     MENDOZA BATES MD

## 2020-05-05 ENCOUNTER — CARE COORDINATION (OUTPATIENT)
Dept: CARDIOLOGY | Facility: CLINIC | Age: 60
End: 2020-05-05

## 2020-05-05 DIAGNOSIS — E78.5 HYPERLIPIDEMIA LDL GOAL <70: ICD-10-CM

## 2020-05-05 DIAGNOSIS — I10 BENIGN ESSENTIAL HYPERTENSION: ICD-10-CM

## 2020-05-05 DIAGNOSIS — Z79.899 ENCOUNTER FOR LONG-TERM (CURRENT) USE OF OTHER MEDICATIONS: Primary | ICD-10-CM

## 2020-05-05 LAB
ALBUMIN SERPL-MCNC: 4.1 G/DL (ref 3.4–5)
ALP SERPL-CCNC: 66 U/L (ref 40–150)
ALT SERPL W P-5'-P-CCNC: 88 U/L (ref 0–70)
ANION GAP SERPL CALCULATED.3IONS-SCNC: 3 MMOL/L (ref 3–14)
AST SERPL W P-5'-P-CCNC: 27 U/L (ref 0–45)
BILIRUB SERPL-MCNC: 0.9 MG/DL (ref 0.2–1.3)
BUN SERPL-MCNC: 14 MG/DL (ref 7–30)
CALCIUM SERPL-MCNC: 8.2 MG/DL (ref 8.5–10.1)
CHLORIDE SERPL-SCNC: 107 MMOL/L (ref 94–109)
CHOLEST SERPL-MCNC: 130 MG/DL
CO2 SERPL-SCNC: 30 MMOL/L (ref 20–32)
CREAT SERPL-MCNC: 0.94 MG/DL (ref 0.66–1.25)
ERYTHROCYTE [DISTWIDTH] IN BLOOD BY AUTOMATED COUNT: 12.4 % (ref 10–15)
GFR SERPL CREATININE-BSD FRML MDRD: 87 ML/MIN/{1.73_M2}
GLUCOSE SERPL-MCNC: 97 MG/DL (ref 70–99)
HCT VFR BLD AUTO: 46 % (ref 40–53)
HDLC SERPL-MCNC: 38 MG/DL
HGB BLD-MCNC: 16.1 G/DL (ref 13.3–17.7)
LDLC SERPL CALC-MCNC: 70 MG/DL
MCH RBC QN AUTO: 32 PG (ref 26.5–33)
MCHC RBC AUTO-ENTMCNC: 35 G/DL (ref 31.5–36.5)
MCV RBC AUTO: 92 FL (ref 78–100)
NONHDLC SERPL-MCNC: 92 MG/DL
PLATELET # BLD AUTO: 206 10E9/L (ref 150–450)
POTASSIUM SERPL-SCNC: 4.2 MMOL/L (ref 3.4–5.3)
PROT SERPL-MCNC: 8.4 G/DL (ref 6.8–8.8)
RBC # BLD AUTO: 5.03 10E12/L (ref 4.4–5.9)
SODIUM SERPL-SCNC: 140 MMOL/L (ref 133–144)
TRIGL SERPL-MCNC: 111 MG/DL
WBC # BLD AUTO: 6.1 10E9/L (ref 4–11)

## 2020-05-05 PROCEDURE — 36415 COLL VENOUS BLD VENIPUNCTURE: CPT | Performed by: INTERNAL MEDICINE

## 2020-05-05 PROCEDURE — 85027 COMPLETE CBC AUTOMATED: CPT | Performed by: INTERNAL MEDICINE

## 2020-05-05 PROCEDURE — 80053 COMPREHEN METABOLIC PANEL: CPT | Performed by: INTERNAL MEDICINE

## 2020-05-05 PROCEDURE — 80061 LIPID PANEL: CPT | Performed by: INTERNAL MEDICINE

## 2020-05-05 NOTE — PROGRESS NOTES
Lab results from 5/5/20 noted. Pt had a virtual visit with  yesterday. Pt's ALT is elevated at 88. Pt takes atorvastatin 80 mg daily. Will send an update to  and call pt with any follow up recommendations. Lashay GTZ May 5, 2020, 4:57 PM          Component      Latest Ref Rng & Units 5/5/2020   Sodium      133 - 144 mmol/L 140   Potassium      3.4 - 5.3 mmol/L 4.2   Chloride      94 - 109 mmol/L 107   Carbon Dioxide      20 - 32 mmol/L 30   Anion Gap      3 - 14 mmol/L 3   Glucose      70 - 99 mg/dL 97   Urea Nitrogen      7 - 30 mg/dL 14   Creatinine      0.66 - 1.25 mg/dL 0.94   GFR Estimate      >60 mL/min/1.73:m2 87   GFR Estimate If Black      >60 mL/min/1.73:m2 >90   Calcium      8.5 - 10.1 mg/dL 8.2 (L)   Bilirubin Total      0.2 - 1.3 mg/dL 0.9   Albumin      3.4 - 5.0 g/dL 4.1   Protein Total      6.8 - 8.8 g/dL 8.4   Alkaline Phosphatase      40 - 150 U/L 66   ALT      0 - 70 U/L 88 (H)   AST      0 - 45 U/L 27   WBC      4.0 - 11.0 10e9/L 6.1   RBC Count      4.4 - 5.9 10e12/L 5.03   Hemoglobin      13.3 - 17.7 g/dL 16.1   Hematocrit      40.0 - 53.0 % 46.0   MCV      78 - 100 fl 92   MCH      26.5 - 33.0 pg 32.0   MCHC      31.5 - 36.5 g/dL 35.0   RDW      10.0 - 15.0 % 12.4   Platelet Count      150 - 450 10e9/L 206   Cholesterol      <200 mg/dL 130   Triglycerides      <150 mg/dL 111   HDL Cholesterol      >39 mg/dL 38 (L)   LDL Cholesterol Calculated      <100 mg/dL 70   Non HDL Cholesterol      <130 mg/dL 92

## 2020-05-07 DIAGNOSIS — E78.5 HYPERLIPIDEMIA LDL GOAL <70: ICD-10-CM

## 2020-05-07 RX ORDER — EZETIMIBE 10 MG/1
10 TABLET ORAL DAILY
Qty: 90 TABLET | Refills: 3 | Status: SHIPPED | OUTPATIENT
Start: 2020-05-07 | End: 2021-04-20

## 2020-05-07 NOTE — PROGRESS NOTES
David, Tay Sosa MD  [facility] 7 22 hours ago (4:51 PM)          Looks fine, no changes. EE         Documentation            Viri Lilly RN     Registered Nurse                 Sign when Signing Visit       Lab results from 5/5/20 noted. Pt had a virtual visit with  yesterday. Pt's ALT is elevated at 88. Pt takes atorvastatin 80 mg daily. Will send an update to  and call pt with any follow up recommendations. Lashay GTZ May 5, 2020, 4:57 PM      Component  Latest Ref Rng & Units 5/5/2020     Sodium  133 - 144 mmol/L 140     Potassium  3.4 - 5.3 mmol/L 4.2     Chloride  94 - 109 mmol/L 107     Carbon Dioxide  20 - 32 mmol/L 30     Anion Gap  3 - 14 mmol/L 3     Glucose  70 - 99 mg/dL 97     Urea Nitrogen  7 - 30 mg/dL 14     Creatinine  0.66 - 1.25 mg/dL 0.94     GFR Estimate  >60 mL/min/1.73:m2 87     GFR Estimate If Black  >60 mL/min/1.73:m2 >90     Calcium  8.5 - 10.1 mg/dL 8.2 (L)     Bilirubin Total  0.2 - 1.3 mg/dL 0.9     Albumin  3.4 - 5.0 g/dL 4.1     Protein Total  6.8 - 8.8 g/dL 8.4     Alkaline Phosphatase  40 - 150 U/L 66     ALT  0 - 70 U/L 88 (H)     AST  0 - 45 U/L 27     WBC  4.0 - 11.0 10e9/L 6.1     RBC Count  4.4 - 5.9 10e12/L 5.03     Hemoglobin  13.3 - 17.7 g/dL 16.1     Hematocrit  40.0 - 53.0 % 46.0     MCV  78 - 100 fl 92     MCH  26.5 - 33.0 pg 32.0     MCHC  31.5 - 36.5 g/dL 35.0     RDW  10.0 - 15.0 % 12.4     Platelet Count  150 - 450 10e9/L 206     Cholesterol  <200 mg/dL 130     Triglycerides  <150 mg/dL 111     HDL Cholesterol  >39 mg/dL 38 (L)     LDL Cholesterol Calculated  <100 mg/dL 70     Non HDL Cholesterol  <130 mg/dL 92         Call out to pt to review lab results. Pt aware is ALT is slightly elevated and states it has been this way for awhile. He had no questions or concerns today. Tiffany Alcala RN on 5/7/2020 at 3:31 PM

## 2020-05-27 DIAGNOSIS — I10 BENIGN ESSENTIAL HYPERTENSION: ICD-10-CM

## 2020-05-27 RX ORDER — AMLODIPINE BESYLATE 5 MG/1
5 TABLET ORAL DAILY
Qty: 90 TABLET | Refills: 3 | Status: SHIPPED | OUTPATIENT
Start: 2020-05-27 | End: 2021-05-18

## 2020-05-29 ENCOUNTER — TELEPHONE (OUTPATIENT)
Dept: CARDIOLOGY | Facility: CLINIC | Age: 60
End: 2020-05-29

## 2020-05-29 NOTE — TELEPHONE ENCOUNTER
Received VM from pt inquiring if Dr. Hernandez would refill his Cialis for him since he prescribes his other meds. It appears pt is on Cialis for ED, not PHTN. Pt's PCP currently prescribes Cialis. I called pt back, wife took message. I relayed that Dr. Hernandez typically refills cardiac meds and defers non-cardiac meds to the PCP. Informed if PCP not willing to prescribe Cialis any longer we can ask Dr. Hernandez for an exception, but it would be next week before he's back in clinic. Wife will relay message to the pt. Tiffany Alcala RN on 5/29/2020 at 2:36 PM

## 2020-06-10 DIAGNOSIS — N52.2 DRUG-INDUCED ERECTILE DYSFUNCTION: ICD-10-CM

## 2020-06-10 DIAGNOSIS — I25.10 CORONARY ARTERY DISEASE INVOLVING NATIVE CORONARY ARTERY OF NATIVE HEART WITHOUT ANGINA PECTORIS: ICD-10-CM

## 2020-06-10 DIAGNOSIS — E78.5 HYPERLIPIDEMIA LDL GOAL <70: ICD-10-CM

## 2020-06-10 RX ORDER — TADALAFIL 10 MG/1
TABLET ORAL
Qty: 12 TABLET | Refills: 5 | Status: SHIPPED | OUTPATIENT
Start: 2020-06-10 | End: 2020-08-05

## 2020-06-10 RX ORDER — CLOPIDOGREL BISULFATE 75 MG/1
75 TABLET ORAL DAILY
Qty: 90 TABLET | Refills: 3 | Status: SHIPPED | OUTPATIENT
Start: 2020-06-10 | End: 2021-05-24

## 2020-06-10 RX ORDER — ATORVASTATIN CALCIUM 80 MG/1
80 TABLET, FILM COATED ORAL DAILY
Qty: 90 TABLET | Refills: 2 | Status: SHIPPED | OUTPATIENT
Start: 2020-06-10 | End: 2021-03-08

## 2020-06-10 RX ORDER — METOPROLOL SUCCINATE 50 MG/1
50 TABLET, EXTENDED RELEASE ORAL DAILY
Qty: 90 TABLET | Refills: 3 | Status: SHIPPED | OUTPATIENT
Start: 2020-06-10 | End: 2021-05-18

## 2020-06-10 NOTE — TELEPHONE ENCOUNTER
Reason for Call:  Medication or medication refill:    Do you use a [facility]?  Name of the pharmacy and phone number for the current request:  [facility]    Name of the medication requested:  tadalafil (CIALIS) 10 MG tablet    Other request: patient states he needs this refilled and is unable to call it in. Patient is aware that Dr. Schoen is out of the office until 6/12/20 and is ok to wait until then.     Can we leave a detailed message on this number? YES    Phone number patient can be reached at: Cell number on file:    Telephone Information:   Mobile [phone]       Best Time: any    Call taken on 6/10/2020 at 12:11 PM by Farzana Harper

## 2020-06-10 NOTE — TELEPHONE ENCOUNTER
I attempted to call pharmacy and it hung up on me.  Please review/advise refill request.  Jovanni Andino, CMA

## 2020-08-05 ENCOUNTER — TELEPHONE (OUTPATIENT)
Dept: FAMILY MEDICINE | Facility: CLINIC | Age: 60
End: 2020-08-05

## 2020-08-05 DIAGNOSIS — N52.2 DRUG-INDUCED ERECTILE DYSFUNCTION: ICD-10-CM

## 2020-08-05 RX ORDER — TADALAFIL 10 MG/1
TABLET ORAL
Qty: 24 TABLET | Refills: 5 | Status: SHIPPED | OUTPATIENT
Start: 2020-08-05 | End: 2021-10-05

## 2020-08-05 NOTE — TELEPHONE ENCOUNTER
Please inform amount doubled and sent to his mail order pharmacy.  Will need to wait to see if they have a restriction on amount.   Electronically signed by Greg Schoen, MD

## 2020-08-05 NOTE — TELEPHONE ENCOUNTER
Reason for Call:  Medication or medication refill:    Do you use a [facility]?  Name of the pharmacy and phone number for the current request:  [facility]     Name of the medication requested: Tadalafil     Other request: requesting a increase in quantity and would like to get two pills per week instead of one.     Can we leave a detailed message on this number? YES    Phone number patient can be reached at: Cell number on file:    Telephone Information:   Mobile [phone]       Best Time: any     Call taken on 8/5/2020 at 12:13 PM by Annette Cobos

## 2020-11-05 ENCOUNTER — MEDICAL CORRESPONDENCE (OUTPATIENT)
Dept: HEALTH INFORMATION MANAGEMENT | Facility: CLINIC | Age: 60
End: 2020-11-05

## 2020-11-05 DIAGNOSIS — Z79.899 NEED FOR PROPHYLACTIC CHEMOTHERAPY: Primary | ICD-10-CM

## 2020-11-09 DIAGNOSIS — Z79.899 NEED FOR PROPHYLACTIC CHEMOTHERAPY: ICD-10-CM

## 2020-11-09 LAB
ALBUMIN SERPL-MCNC: 4.2 G/DL (ref 3.4–5)
ALP SERPL-CCNC: 85 U/L (ref 40–150)
ALT SERPL W P-5'-P-CCNC: 74 U/L (ref 0–70)
ANION GAP SERPL CALCULATED.3IONS-SCNC: 6 MMOL/L (ref 3–14)
AST SERPL W P-5'-P-CCNC: 26 U/L (ref 0–45)
BASOPHILS # BLD AUTO: 0 10E9/L (ref 0–0.2)
BASOPHILS NFR BLD AUTO: 0.3 %
BILIRUB SERPL-MCNC: 0.7 MG/DL (ref 0.2–1.3)
BUN SERPL-MCNC: 18 MG/DL (ref 7–30)
CALCIUM SERPL-MCNC: 9.1 MG/DL (ref 8.5–10.1)
CHLORIDE SERPL-SCNC: 108 MMOL/L (ref 94–109)
CO2 SERPL-SCNC: 27 MMOL/L (ref 20–32)
CREAT SERPL-MCNC: 0.93 MG/DL (ref 0.66–1.25)
DIFFERENTIAL METHOD BLD: NORMAL
EOSINOPHIL NFR BLD AUTO: 2 %
ERYTHROCYTE [DISTWIDTH] IN BLOOD BY AUTOMATED COUNT: 12.3 % (ref 10–15)
GFR SERPL CREATININE-BSD FRML MDRD: 87 ML/MIN/{1.73_M2}
GLUCOSE SERPL-MCNC: 101 MG/DL (ref 70–99)
HCT VFR BLD AUTO: 43 % (ref 40–53)
HGB BLD-MCNC: 15.4 G/DL (ref 13.3–17.7)
IMM GRANULOCYTES # BLD: 0 10E9/L (ref 0–0.4)
IMM GRANULOCYTES NFR BLD: 0.1 %
LYMPHOCYTES # BLD AUTO: 3.1 10E9/L (ref 0.8–5.3)
LYMPHOCYTES NFR BLD AUTO: 44.3 %
MCH RBC QN AUTO: 32.4 PG (ref 26.5–33)
MCHC RBC AUTO-ENTMCNC: 35.8 G/DL (ref 31.5–36.5)
MCV RBC AUTO: 91 FL (ref 78–100)
MONOCYTES # BLD AUTO: 0.6 10E9/L (ref 0–1.3)
MONOCYTES NFR BLD AUTO: 8.4 %
NEUTROPHILS # BLD AUTO: 3.1 10E9/L (ref 1.6–8.3)
NEUTROPHILS NFR BLD AUTO: 44.9 %
NRBC # BLD AUTO: 0 10*3/UL
NRBC BLD AUTO-RTO: 0 /100
PLATELET # BLD AUTO: 213 10E9/L (ref 150–450)
POTASSIUM SERPL-SCNC: 3.8 MMOL/L (ref 3.4–5.3)
PROT SERPL-MCNC: 8 G/DL (ref 6.8–8.8)
RBC # BLD AUTO: 4.75 10E12/L (ref 4.4–5.9)
SODIUM SERPL-SCNC: 141 MMOL/L (ref 133–144)
WBC # BLD AUTO: 6.9 10E9/L (ref 4–11)

## 2020-11-09 PROCEDURE — 85025 COMPLETE CBC W/AUTO DIFF WBC: CPT | Performed by: PHYSICIAN ASSISTANT

## 2020-11-09 PROCEDURE — 80053 COMPREHEN METABOLIC PANEL: CPT | Performed by: PHYSICIAN ASSISTANT

## 2020-11-09 PROCEDURE — 86481 TB AG RESPONSE T-CELL SUSP: CPT | Performed by: PHYSICIAN ASSISTANT

## 2020-11-09 PROCEDURE — 36415 COLL VENOUS BLD VENIPUNCTURE: CPT | Performed by: PHYSICIAN ASSISTANT

## 2020-11-11 LAB
GAMMA INTERFERON BACKGROUND BLD IA-ACNC: 0.03 IU/ML
M TB IFN-G CD4+ BCKGRND COR BLD-ACNC: 9.97 IU/ML
M TB TUBERC IFN-G BLD QL: NEGATIVE
MITOGEN IGNF BCKGRD COR BLD-ACNC: 0 IU/ML
MITOGEN IGNF BCKGRD COR BLD-ACNC: 0.01 IU/ML

## 2021-03-08 DIAGNOSIS — E78.5 HYPERLIPIDEMIA LDL GOAL <70: ICD-10-CM

## 2021-03-08 RX ORDER — ATORVASTATIN CALCIUM 80 MG/1
80 TABLET, FILM COATED ORAL DAILY
Qty: 90 TABLET | Refills: 0 | Status: SHIPPED | OUTPATIENT
Start: 2021-03-08 | End: 2021-06-08

## 2021-03-13 ENCOUNTER — HEALTH MAINTENANCE LETTER (OUTPATIENT)
Age: 61
End: 2021-03-13

## 2021-04-20 DIAGNOSIS — E78.5 HYPERLIPIDEMIA LDL GOAL <70: ICD-10-CM

## 2021-04-20 RX ORDER — EZETIMIBE 10 MG/1
10 TABLET ORAL DAILY
Qty: 90 TABLET | Refills: 0 | Status: SHIPPED | OUTPATIENT
Start: 2021-04-20 | End: 2021-07-19

## 2021-05-04 ENCOUNTER — OFFICE VISIT (OUTPATIENT)
Dept: CARDIOLOGY | Facility: CLINIC | Age: 61
End: 2021-05-04
Payer: COMMERCIAL

## 2021-05-04 VITALS
SYSTOLIC BLOOD PRESSURE: 122 MMHG | DIASTOLIC BLOOD PRESSURE: 84 MMHG | WEIGHT: 216.6 LBS | BODY MASS INDEX: 29.34 KG/M2 | OXYGEN SATURATION: 98 % | HEART RATE: 58 BPM | HEIGHT: 72 IN

## 2021-05-04 DIAGNOSIS — E78.5 HYPERLIPIDEMIA LDL GOAL <70: ICD-10-CM

## 2021-05-04 DIAGNOSIS — I10 BENIGN ESSENTIAL HYPERTENSION: ICD-10-CM

## 2021-05-04 DIAGNOSIS — I25.118 CORONARY ARTERY DISEASE OF NATIVE ARTERY OF NATIVE HEART WITH STABLE ANGINA PECTORIS (H): Primary | ICD-10-CM

## 2021-05-04 DIAGNOSIS — I20.89 STABLE ANGINA PECTORIS (H): ICD-10-CM

## 2021-05-04 DIAGNOSIS — L40.0 PSORIASIS VULGARIS: Primary | ICD-10-CM

## 2021-05-04 LAB
ALBUMIN SERPL-MCNC: 4.6 G/DL (ref 3.4–5)
ALP SERPL-CCNC: 62 U/L (ref 40–150)
ALT SERPL W P-5'-P-CCNC: 53 U/L (ref 0–70)
ANION GAP SERPL CALCULATED.3IONS-SCNC: 3 MMOL/L (ref 3–14)
AST SERPL W P-5'-P-CCNC: 23 U/L (ref 0–45)
BASOPHILS # BLD AUTO: 0 10E9/L (ref 0–0.2)
BASOPHILS NFR BLD AUTO: 0.2 %
BILIRUB SERPL-MCNC: 1.2 MG/DL (ref 0.2–1.3)
BUN SERPL-MCNC: 15 MG/DL (ref 7–30)
CALCIUM SERPL-MCNC: 9.2 MG/DL (ref 8.5–10.1)
CHLORIDE SERPL-SCNC: 107 MMOL/L (ref 94–109)
CHOLEST SERPL-MCNC: 130 MG/DL
CO2 SERPL-SCNC: 29 MMOL/L (ref 20–32)
CREAT SERPL-MCNC: 0.92 MG/DL (ref 0.66–1.25)
DIFFERENTIAL METHOD BLD: NORMAL
EOSINOPHIL # BLD AUTO: 0.1 10E9/L (ref 0–0.7)
EOSINOPHIL NFR BLD AUTO: 1.8 %
ERYTHROCYTE [DISTWIDTH] IN BLOOD BY AUTOMATED COUNT: 12.9 % (ref 10–15)
GFR SERPL CREATININE-BSD FRML MDRD: 89 ML/MIN/{1.73_M2}
GLUCOSE SERPL-MCNC: 92 MG/DL (ref 70–99)
HCT VFR BLD AUTO: 47.9 % (ref 40–53)
HDLC SERPL-MCNC: 42 MG/DL
HGB BLD-MCNC: 16.5 G/DL (ref 13.3–17.7)
IMM GRANULOCYTES # BLD: 0 10E9/L (ref 0–0.4)
IMM GRANULOCYTES NFR BLD: 0 %
LDLC SERPL CALC-MCNC: 67 MG/DL
LYMPHOCYTES # BLD AUTO: 2.5 10E9/L (ref 0.8–5.3)
LYMPHOCYTES NFR BLD AUTO: 40.7 %
MCH RBC QN AUTO: 31.7 PG (ref 26.5–33)
MCHC RBC AUTO-ENTMCNC: 34.4 G/DL (ref 31.5–36.5)
MCV RBC AUTO: 92 FL (ref 78–100)
MONOCYTES # BLD AUTO: 0.4 10E9/L (ref 0–1.3)
MONOCYTES NFR BLD AUTO: 6.6 %
NEUTROPHILS # BLD AUTO: 3.1 10E9/L (ref 1.6–8.3)
NEUTROPHILS NFR BLD AUTO: 50.7 %
NONHDLC SERPL-MCNC: 88 MG/DL
NRBC # BLD AUTO: 0 10*3/UL
NRBC BLD AUTO-RTO: 0 /100
PLATELET # BLD AUTO: 217 10E9/L (ref 150–450)
POTASSIUM SERPL-SCNC: 4.4 MMOL/L (ref 3.4–5.3)
PROT SERPL-MCNC: 8.5 G/DL (ref 6.8–8.8)
RBC # BLD AUTO: 5.2 10E12/L (ref 4.4–5.9)
SODIUM SERPL-SCNC: 139 MMOL/L (ref 133–144)
TRIGL SERPL-MCNC: 104 MG/DL
WBC # BLD AUTO: 6.2 10E9/L (ref 4–11)

## 2021-05-04 PROCEDURE — 99214 OFFICE O/P EST MOD 30 MIN: CPT | Performed by: INTERNAL MEDICINE

## 2021-05-04 PROCEDURE — 80061 LIPID PANEL: CPT | Performed by: INTERNAL MEDICINE

## 2021-05-04 PROCEDURE — 36415 COLL VENOUS BLD VENIPUNCTURE: CPT | Performed by: INTERNAL MEDICINE

## 2021-05-04 PROCEDURE — 86481 TB AG RESPONSE T-CELL SUSP: CPT | Performed by: PHYSICIAN ASSISTANT

## 2021-05-04 PROCEDURE — 80053 COMPREHEN METABOLIC PANEL: CPT | Performed by: INTERNAL MEDICINE

## 2021-05-04 PROCEDURE — 85025 COMPLETE CBC W/AUTO DIFF WBC: CPT | Performed by: PHYSICIAN ASSISTANT

## 2021-05-04 ASSESSMENT — MIFFLIN-ST. JEOR: SCORE: 1815.49

## 2021-05-04 NOTE — LETTER
5/4/2021    Gregory G. Schoen, MD  [address] [place] [address]    RE: Leonides Paul       Dear Colleague,    I had the pleasure of seeing Leonides Paul in the [facility].    HPI and Plan:   See dictation    Orders Placed This Encounter   Procedures     Basic metabolic panel     Lipid Profile     ALT     Comprehensive metabolic panel     Follow-Up with Cardiologist       Orders Placed This Encounter   Medications     guselkumab (TREMFYA) 100 MG/ML SOSY     Sig: Inject 100 mg Subcutaneous once       Medications Discontinued During This Encounter   Medication Reason     UNABLE TO FIND Therapy completed         Encounter Diagnoses   Name Primary?     Coronary artery disease of native artery of native heart with stable angina pectoris (H) Yes     Hyperlipidemia LDL goal <70      Benign essential hypertension      Stable angina pectoris (H)        CURRENT MEDICATIONS:  Current Outpatient Medications   Medication Sig Dispense Refill     amLODIPine (NORVASC) 5 MG tablet Take 1 tablet (5 mg) by mouth daily 90 tablet 3     atorvastatin (LIPITOR) 80 MG tablet Take 1 tablet (80 mg) by mouth daily 90 tablet 0     clobetasol propionate (OLUX) 0.05 % external foam        clopidogrel (PLAVIX) 75 MG tablet Take 1 tablet (75 mg) by mouth daily 90 tablet 3     ezetimibe (ZETIA) 10 MG tablet Take 1 tablet (10 mg) by mouth daily 90 tablet 0     guselkumab (TREMFYA) 100 MG/ML SOSY Inject 100 mg Subcutaneous once       MELATONIN PO Take by mouth At Bedtime       metoprolol succinate ER (TOPROL XL) 50 MG 24 hr tablet Take 1 tablet (50 mg) by mouth daily 90 tablet 3     tadalafil (CIALIS) 10 MG tablet TAKE 1 TABLET DAILY AS     NEEDED FOR ERECTILE        DYSFUNCTION 24 tablet 5     ACE/ARB NOT PRESCRIBED, INTENTIONAL, by Other route continuous prn.       ASPIRIN NOT PRESCRIBED, INTENTIONAL, by Other route continuous prn.  0     triamcinolone (KENALOG) 0.5 % cream  Apply to affected areas twice daily (Patient not taking: Reported on 2021) 30 g 2       ALLERGIES     Allergies   Allergen Reactions     No Known Drug Allergies        PAST MEDICAL HISTORY:  Past Medical History:   Diagnosis Date     CAD (coronary artery disease) 2010     History of angina pectoris 3/1/2018     Hyperlipidemia LDL goal < 100 2010     Hypertension 2010     Other psoriasis 12/15/2005       PAST SURGICAL HISTORY:  Past Surgical History:   Procedure Laterality Date     COLONOSCOPY  02/10/10     HC LAPAROSCOPY, SURGICAL; APPENDECTOMY  7/17/10     REMOVAL OF SPERM DUCT(S)         FAMILY HISTORY:  Family History   Problem Relation Age of Onset     Allergies Daughter         environment al     Allergies Son         same as sister     Alzheimer Disease Father          at age 73     Blood Disease Father      Cancer Maternal Grandfather         Pulcifer lung -     Heart Disease Maternal Grandmother      Psychotic Disorder Brother        SOCIAL HISTORY:  Social History     Socioeconomic History     Marital status:      Spouse name: Not on file     Number of children: Not on file     Years of education: Not on file     Highest education level: Not on file   Occupational History     Not on file   Social Needs     Financial resource strain: Not on file     Food insecurity     Worry: Not on file     Inability: Not on file     Transportation needs     Medical: Not on file     Non-medical: Not on file   Tobacco Use     Smoking status: Former Smoker     Packs/day: 2.00     Years: 12.00     Pack years: 24.00     Smokeless tobacco: Never Used     Tobacco comment: Quit at age 25   Substance and Sexual Activity     Alcohol use: Yes     Comment: beer weekly     Drug use: No     Sexual activity: Yes     Partners: Female   Lifestyle     Physical activity     Days per week: Not on file     Minutes per session: Not on file     Stress: Not on file   Relationships     Social connections     Talks  on phone: Not on file     Gets together: Not on file     Attends Episcopal service: Not on file     Active member of club or organization: Not on file     Attends meetings of clubs or organizations: Not on file     Relationship status: Not on file     Intimate partner violence     Fear of current or ex partner: Not on file     Emotionally abused: Not on file     Physically abused: Not on file     Forced sexual activity: Not on file   Other Topics Concern     Parent/sibling w/ CABG, MI or angioplasty before 65F 55M? Not Asked   Social History Narrative     Not on file       Review of Systems:  Skin:  Positive for rash     Eyes:  Negative      ENT:  Negative      Respiratory:  Positive for dyspnea on exertion     Cardiovascular:  Negative for;edema;lightheadedness;dizziness Positive for;chest pain;palpitations does get a little bit of chest discomfort when walking this has gone on since he was 40  Gastroenterology: Negative      Genitourinary:  Negative      Musculoskeletal:  Positive for arthritis    Neurologic:  Negative      Psychiatric:  Negative      Heme/Lymph/Imm:  Negative      Endocrine:  Negative        Physical Exam:  Vitals: /84 (BP Location: Right arm, Patient Position: Sitting, Cuff Size: Adult Regular)   Pulse 58   Ht 1.829 m (6')   Wt 98.2 kg (216 lb 9.6 oz)   SpO2 98%   BMI 29.38 kg/m      Constitutional:  cooperative, alert and oriented, well developed, well nourished, in no acute distress        Skin:  warm and dry to the touch, no apparent skin lesions or masses noted          Head:  normocephalic, no masses or lesions        Eyes:           Lymph:      ENT:  no pallor or cyanosis, dentition good        Neck:           Respiratory:  normal breath sounds, clear to auscultation, normal A-P diameter, normal symmetry, normal respiratory excursion, no use of accessory muscles;clear to auscultation         Cardiac: regular rhythm, normal S1/S2, no S3 or S4, apical impulse not displaced, no  murmurs, gallops or rubs                pulses full and equal, no bruits auscultated                                        GI:  abdomen soft, non-tender, BS normoactive, no mass, no HSM, no bruits        Extremities and Muscular Skeletal:  no deformities, clubbing, cyanosis, erythema observed;no edema              Neurological:           Psych:         Thank you for allowing me to participate in the care of your patient.      Sincerely,     TAY HERNANDEZ MD     [facility]    cc:   Tay Hernandez MD  [address] [address]

## 2021-05-04 NOTE — PROGRESS NOTES
HPI and Plan:   See dictation    Orders Placed This Encounter   Procedures     Basic metabolic panel     Lipid Profile     ALT     Comprehensive metabolic panel     Follow-Up with Cardiologist       Orders Placed This Encounter   Medications     guselkumab (TREMFYA) 100 MG/ML SOSY     Sig: Inject 100 mg Subcutaneous once       Medications Discontinued During This Encounter   Medication Reason     UNABLE TO FIND Therapy completed         Encounter Diagnoses   Name Primary?     Coronary artery disease of native artery of native heart with stable angina pectoris (H) Yes     Hyperlipidemia LDL goal <70      Benign essential hypertension      Stable angina pectoris (H)        CURRENT MEDICATIONS:  Current Outpatient Medications   Medication Sig Dispense Refill     amLODIPine (NORVASC) 5 MG tablet Take 1 tablet (5 mg) by mouth daily 90 tablet 3     atorvastatin (LIPITOR) 80 MG tablet Take 1 tablet (80 mg) by mouth daily 90 tablet 0     clobetasol propionate (OLUX) 0.05 % external foam        clopidogrel (PLAVIX) 75 MG tablet Take 1 tablet (75 mg) by mouth daily 90 tablet 3     ezetimibe (ZETIA) 10 MG tablet Take 1 tablet (10 mg) by mouth daily 90 tablet 0     guselkumab (TREMFYA) 100 MG/ML SOSY Inject 100 mg Subcutaneous once       MELATONIN PO Take by mouth At Bedtime       metoprolol succinate ER (TOPROL XL) 50 MG 24 hr tablet Take 1 tablet (50 mg) by mouth daily 90 tablet 3     tadalafil (CIALIS) 10 MG tablet TAKE 1 TABLET DAILY AS     NEEDED FOR ERECTILE        DYSFUNCTION 24 tablet 5     ACE/ARB NOT PRESCRIBED, INTENTIONAL, by Other route continuous prn.       ASPIRIN NOT PRESCRIBED, INTENTIONAL, by Other route continuous prn.  0     triamcinolone (KENALOG) 0.5 % cream Apply to affected areas twice daily (Patient not taking: Reported on 5/4/2021) 30 g 2       ALLERGIES     Allergies   Allergen Reactions     No Known Drug Allergies        PAST MEDICAL HISTORY:  Past Medical History:   Diagnosis Date     CAD (coronary  artery disease) 2010     History of angina pectoris 3/1/2018     Hyperlipidemia LDL goal < 100 2010     Hypertension 2010     Other psoriasis 12/15/2005       PAST SURGICAL HISTORY:  Past Surgical History:   Procedure Laterality Date     COLONOSCOPY  02/10/10     HC LAPAROSCOPY, SURGICAL; APPENDECTOMY  7/17/10     REMOVAL OF SPERM DUCT(S)         FAMILY HISTORY:  Family History   Problem Relation Age of Onset     Allergies Daughter         environment al     Allergies Son         same as sister     Alzheimer Disease Father          at age 73     Blood Disease Father      Cancer Maternal Grandfather         [place] lung -     Heart Disease Maternal Grandmother      Psychotic Disorder Brother        SOCIAL HISTORY:  Social History     Socioeconomic History     Marital status:      Spouse name: Not on file     Number of children: Not on file     Years of education: Not on file     Highest education level: Not on file   Occupational History     Not on file   Social Needs     Financial resource strain: Not on file     Food insecurity     Worry: Not on file     Inability: Not on file     Transportation needs     Medical: Not on file     Non-medical: Not on file   Tobacco Use     Smoking status: Former Smoker     Packs/day: 2.00     Years: 12.00     Pack years: 24.00     Smokeless tobacco: Never Used     Tobacco comment: Quit at age 25   Substance and Sexual Activity     Alcohol use: Yes     Comment: beer weekly     Drug use: No     Sexual activity: Yes     Partners: Female   Lifestyle     Physical activity     Days per week: Not on file     Minutes per session: Not on file     Stress: Not on file   Relationships     Social connections     Talks on phone: Not on file     Gets together: Not on file     Attends Mor[employer] service: Not on file     Active member of club or organization: Not on file     Attends meetings of clubs or organizations: Not on file     Relationship status: Not on file      Intimate partner violence     Fear of current or ex partner: Not on file     Emotionally abused: Not on file     Physically abused: Not on file     Forced sexual activity: Not on file   Other Topics Concern     Parent/sibling w/ CABG, MI or angioplasty before 65F 55M? Not Asked   Social History Narrative     Not on file       Review of Systems:  Skin:  Positive for rash     Eyes:  Negative      ENT:  Negative      Respiratory:  Positive for dyspnea on exertion     Cardiovascular:  Negative for;edema;lightheadedness;dizziness Positive for;chest pain;palpitations does get a little bit of chest discomfort when walking this has gone on since he was 40  Gastroenterology: Negative      Genitourinary:  Negative      Musculoskeletal:  Positive for arthritis    Neurologic:  Negative      Psychiatric:  Negative      Heme/Lymph/Imm:  Negative      Endocrine:  Negative        Physical Exam:  Vitals: /84 (BP Location: Right arm, Patient Position: Sitting, Cuff Size: Adult Regular)   Pulse 58   Ht 1.829 m (6')   Wt 98.2 kg (216 lb 9.6 oz)   SpO2 98%   BMI 29.38 kg/m      Constitutional:  cooperative, alert and oriented, well developed, well nourished, in no acute distress        Skin:  warm and dry to the touch, no apparent skin lesions or masses noted          Head:  normocephalic, no masses or lesions        Eyes:           Lymph:      ENT:  no pallor or cyanosis, dentition good        Neck:           Respiratory:  normal breath sounds, clear to auscultation, normal A-P diameter, normal symmetry, normal respiratory excursion, no use of accessory muscles;clear to auscultation         Cardiac: regular rhythm, normal S1/S2, no S3 or S4, apical impulse not displaced, no murmurs, gallops or rubs                pulses full and equal, no bruits auscultated                                        GI:  abdomen soft, non-tender, BS normoactive, no mass, no HSM, no bruits        Extremities and Muscular Skeletal:  no  deformities, clubbing, cyanosis, erythema observed;no edema              Neurological:           Psych:           CC  Tay Hernandez MD  [address]  [place] [address]

## 2021-05-04 NOTE — PROGRESS NOTES
Service Date: 2021    Gregory G. Schoen, MD  [facility]  [address] [place]  [address]    RE:  Leonides Paul  MRN:  [id number]  :  1960    Dear Juan:    I had the opportunity to see Mr. Leonides Paul in Cardiology Clinic today at [facility] in [place] for reevaluation of coronary artery disease and chronic stable angina.  We identified his coronary artery disease issues almost 20 years ago and initially stented his LAD, but he developed chronic restenosis issues and his LAD has become chronically occluded with good distal collaterals.  As a result, he has had chronic exertional angina, which has been stable for many years.  He is still quite active and walks the course while he is playing golf.  He notices that he develops a little bit of angina if he walks too fast or exerts himself too strenuously but if he stops to rest that resolves very quickly and often does not recur again after he continues to walk longer.    He is planning to retire at the beginning of , play more golf, and do some traveling to [place].    His risk factors include hypertension and dyslipidemia.  I am awaiting his cholesterol numbers from this year, but they have been under excellent control in the past.  He has had some mildly elevated liver function tests recently because of some medication he takes for psoriasis primarily.  This would not prompt me to stop his statin or reduce the dose.  He drinks very little alcohol.    PHYSICAL EXAMINATION:    VITAL SIGNS:  Blood pressure is 122/84, heart rate 58 and weight 215 pounds.  LUNGS:  Clear.  HEART:  Rhythm is regular.  There are no cardiac murmurs or carotid bruits.    ASSESSMENT:  Mr. Leonides Paul is a 61-year-old gentleman with hypertension, dyslipidemia, and chronic stable angina due to an occluded LAD with collaterals.  He is very stable and doing well.  He is able to exert himself without too many anginal issues.  We  will continue his medical therapy and plan on having him follow up with me again next year for reevaluation.  His cholesterol numbers are excellent and I am not concerned about his mildly elevated liver function tests at this point as long as they remain stable.    Sincerely,    MD Tay Kwok MD, [facility]        D: 2021   T: 2021   MT: alicia    Name:     JIM FERNANDEZ  MRN:      [id number]        Account:      [id number]   :      1960           Service Date: 2021       Document: [id number]

## 2021-05-05 LAB
GAMMA INTERFERON BACKGROUND BLD IA-ACNC: 0 IU/ML
M TB IFN-G CD4+ BCKGRND COR BLD-ACNC: 10 IU/ML
M TB TUBERC IFN-G BLD QL: NEGATIVE
MITOGEN IGNF BCKGRD COR BLD-ACNC: 0.12 IU/ML
MITOGEN IGNF BCKGRD COR BLD-ACNC: 0.13 IU/ML

## 2021-05-18 DIAGNOSIS — I10 BENIGN ESSENTIAL HYPERTENSION: ICD-10-CM

## 2021-05-18 DIAGNOSIS — I25.10 CORONARY ARTERY DISEASE INVOLVING NATIVE CORONARY ARTERY OF NATIVE HEART WITHOUT ANGINA PECTORIS: ICD-10-CM

## 2021-05-18 RX ORDER — AMLODIPINE BESYLATE 5 MG/1
5 TABLET ORAL DAILY
Qty: 90 TABLET | Refills: 3 | Status: SHIPPED | OUTPATIENT
Start: 2021-05-18 | End: 2022-04-19

## 2021-05-18 RX ORDER — METOPROLOL SUCCINATE 50 MG/1
50 TABLET, EXTENDED RELEASE ORAL DAILY
Qty: 90 TABLET | Refills: 3 | Status: SHIPPED | OUTPATIENT
Start: 2021-05-18 | End: 2022-04-19

## 2021-05-24 DIAGNOSIS — I25.10 CORONARY ARTERY DISEASE INVOLVING NATIVE CORONARY ARTERY OF NATIVE HEART WITHOUT ANGINA PECTORIS: ICD-10-CM

## 2021-05-24 RX ORDER — CLOPIDOGREL BISULFATE 75 MG/1
75 TABLET ORAL DAILY
Qty: 90 TABLET | Refills: 3 | Status: SHIPPED | OUTPATIENT
Start: 2021-05-24 | End: 2022-04-19

## 2021-06-08 DIAGNOSIS — E78.5 HYPERLIPIDEMIA LDL GOAL <70: ICD-10-CM

## 2021-06-08 RX ORDER — ATORVASTATIN CALCIUM 80 MG/1
80 TABLET, FILM COATED ORAL DAILY
Qty: 90 TABLET | Refills: 2 | Status: SHIPPED | OUTPATIENT
Start: 2021-06-08 | End: 2022-02-14

## 2021-07-19 DIAGNOSIS — E78.5 HYPERLIPIDEMIA LDL GOAL <70: ICD-10-CM

## 2021-07-19 RX ORDER — EZETIMIBE 10 MG/1
10 TABLET ORAL DAILY
Qty: 90 TABLET | Refills: 2 | Status: SHIPPED | OUTPATIENT
Start: 2021-07-19 | End: 2022-03-22

## 2021-10-04 ENCOUNTER — MYC MEDICAL ADVICE (OUTPATIENT)
Dept: FAMILY MEDICINE | Facility: CLINIC | Age: 61
End: 2021-10-04

## 2021-10-23 ENCOUNTER — HEALTH MAINTENANCE LETTER (OUTPATIENT)
Age: 61
End: 2021-10-23

## 2021-11-12 ENCOUNTER — OFFICE VISIT (OUTPATIENT)
Dept: FAMILY MEDICINE | Facility: CLINIC | Age: 61
End: 2021-11-12
Payer: COMMERCIAL

## 2021-11-12 VITALS
HEART RATE: 63 BPM | WEIGHT: 208.5 LBS | BODY MASS INDEX: 28.28 KG/M2 | OXYGEN SATURATION: 97 % | TEMPERATURE: 98.2 F | SYSTOLIC BLOOD PRESSURE: 118 MMHG | RESPIRATION RATE: 16 BRPM | DIASTOLIC BLOOD PRESSURE: 70 MMHG

## 2021-11-12 DIAGNOSIS — Z12.11 SPECIAL SCREENING FOR MALIGNANT NEOPLASMS, COLON: ICD-10-CM

## 2021-11-12 DIAGNOSIS — E78.5 HYPERLIPIDEMIA WITH TARGET LDL LESS THAN 100: ICD-10-CM

## 2021-11-12 DIAGNOSIS — I25.118 CORONARY ARTERY DISEASE OF NATIVE ARTERY OF NATIVE HEART WITH STABLE ANGINA PECTORIS (H): Primary | ICD-10-CM

## 2021-11-12 DIAGNOSIS — N52.2 DRUG-INDUCED ERECTILE DYSFUNCTION: ICD-10-CM

## 2021-11-12 PROCEDURE — 90714 TD VACC NO PRESV 7 YRS+ IM: CPT | Performed by: FAMILY MEDICINE

## 2021-11-12 PROCEDURE — 90750 HZV VACC RECOMBINANT IM: CPT | Performed by: FAMILY MEDICINE

## 2021-11-12 PROCEDURE — 90472 IMMUNIZATION ADMIN EACH ADD: CPT | Performed by: FAMILY MEDICINE

## 2021-11-12 PROCEDURE — 99214 OFFICE O/P EST MOD 30 MIN: CPT | Mod: 25 | Performed by: FAMILY MEDICINE

## 2021-11-12 PROCEDURE — 90471 IMMUNIZATION ADMIN: CPT | Performed by: FAMILY MEDICINE

## 2021-11-12 RX ORDER — TADALAFIL 20 MG/1
20 TABLET ORAL DAILY PRN
Qty: 24 TABLET | Refills: 3 | Status: SHIPPED | OUTPATIENT
Start: 2021-11-12 | End: 2022-08-24

## 2021-11-12 ASSESSMENT — PAIN SCALES - GENERAL: PAINLEVEL: NO PAIN (0)

## 2021-11-12 NOTE — PROGRESS NOTES
Assessment & Plan     Coronary artery disease of native artery of native heart with stable angina pectoris (H)  Clinically stable with exertional microvascular angina that resolves with rest.  No change in meds. Follow up with cardiology visit in 6 months with repeat labs. Call if any change in angina.     Hyperlipidemia with target LDL less than 100  Tolerating statin plus zetia with good control as noted by labs.     Drug-induced erectile dysfunction  Decreasing effect of 10 mg cialis and requests increase in dose or alternative med.  He has noted good response withi 20 mg dose. Will adjust Rx.   - tadalafil (CIALIS) 20 MG tablet; Take 1 tablet (20 mg) by mouth daily as needed    Special screening for malignant neoplasms, colon  Past due for routine screening colonoscopy.  This has been ordered and patient informed that if it is done within 30 days of this visit, I have given him clearance to proceed with conscious/deep sedation without further work up.   - Adult Gastro Ref - Procedure Only; Future    Weight management plan: Discussed healthy diet and exercise guidelines        Return in about 6 months (around 5/12/2022) for in person with cardiology.    Gregory G. Schoen, MD  [facility]   Leonides is a 61 year old who presents for the following health issues     HPI     Medication Followup of Amlodipine, atorvastatin, clopidogrel, ezetimibe, metoprolol, tadalafil     Taking Medication as prescribed: yes    Side Effects:  None    Medication Helping Symptoms:  yes   Leonides reports overall he has been feeling well. He continues to have microvascular angina with exertion and states he walks until he feels a bit of chest discomfort, rests a few minutes until it clears, then resumes, as he has been doing for years.  Otherwise no unprovoked chest pains, palpitations, shortness of breath, dizziness or angina equivalents.     He states he strained his right shoulder in some  fashion without specific injury and that put his golf game on the shelf from most of this past summer, which was unfortunate as he is retired.  It is better now and does not request any evaluation.     He reports he is tolerating his psoriasis medication fine and no issues with infections, etc.     Primary concern today is he has been having more issues with ED with his current dose of 10 mg cialis.  He is most often able to obtain an erection but not sustain it.  He has tried taking two tablets now and then and 20 mg does seem to work okay but has limited supply.     Current Outpatient Medications   Medication Sig Dispense Refill     amLODIPine (NORVASC) 5 MG tablet Take 1 tablet (5 mg) by mouth daily 90 tablet 3     atorvastatin (LIPITOR) 80 MG tablet Take 1 tablet (80 mg) by mouth daily 90 tablet 2     clobetasol propionate (OLUX) 0.05 % external foam        clopidogrel (PLAVIX) 75 MG tablet Take 1 tablet (75 mg) by mouth daily 90 tablet 3     ezetimibe (ZETIA) 10 MG tablet Take 1 tablet (10 mg) by mouth daily 90 tablet 2     guselkumab (TREMFYA) 100 MG/ML SOSY Inject 100 mg Subcutaneous once       metoprolol succinate ER (TOPROL XL) 50 MG 24 hr tablet Take 1 tablet (50 mg) by mouth daily 90 tablet 3     tadalafil (CIALIS) 20 MG tablet Take 1 tablet (20 mg) by mouth daily as needed 24 tablet 3     ACE/ARB NOT PRESCRIBED, INTENTIONAL, by Other route continuous prn. (Patient not taking: Reported on 11/12/2021)       tadalafil (CIALIS) 20 MG tablet Take 1 tablet (20 mg) by mouth daily as needed 6 tablet 0         Review of Systems   CONSTITUTIONAL: NEGATIVE for fever, chills, change in weight  INTEGUMENTARY/SKIN: psoriasis controlled with biologic   ENT/MOUTH: NEGATIVE for ear, mouth and throat problems  RESP: NEGATIVE for significant cough or SOB  CV: as above  : as above  MUSCULOSKELETAL: right shoulder pain as above  HEME/ALLERGY/IMMUNE: NEGATIVE for bleeding problems      Objective    /70 (BP Location:  Right arm, Patient Position: Sitting, Cuff Size: Adult Large)   Pulse 63   Temp 98.2  F (36.8  C) (Temporal)   Resp 16   Wt 94.6 kg (208 lb 8 oz)   SpO2 97%   BMI 28.28 kg/m    There is no height or weight on file to calculate BMI.  Physical Exam   GENERAL: healthy, alert and no distress  EYES: Eyes grossly normal to inspection, PERRL and conjunctivae and sclerae normal  HENT: ear canals and TM's normal, nose and mouth without ulcers or lesions  NECK: no adenopathy, no asymmetry, masses, or scars and thyroid normal to palpation  RESP: lungs clear to auscultation - no rales, rhonchi or wheezes  CV: regular rate and rhythm, normal S1 S2, no murmur, click or rub, no peripheral edema.  ABDOMEN: soft, nontender, no hepatosplenomegaly, no masses and bowel sounds normal  MS: no gross musculoskeletal defects noted, no edema  SKIN: no suspicious lesions or rashes  PSYCH: mentation appears normal, affect normal/bright    Last labs were 6 months ago and all normal.  He is not on ACE-I, ARB or diuretic so annual renal/electrolyte/hepatic and lipid screening is adequate each May.     Component      Latest Ref Rng & Units 5/4/2021   WBC      4.0 - 11.0 10e9/L 6.2   RBC Count      4.4 - 5.9 10e12/L 5.20   Hemoglobin      13.3 - 17.7 g/dL 16.5   Hematocrit      40.0 - 53.0 % 47.9   MCV      78 - 100 fl 92   MCH      26.5 - 33.0 pg 31.7   MCHC      31.5 - 36.5 g/dL 34.4   RDW      10.0 - 15.0 % 12.9   Platelet Count      150 - 450 10e9/L 217   Diff Method       Automated Method   % Neutrophils      % 50.7   % Lymphocytes      % 40.7   % Monocytes      % 6.6   % Eosinophils      % 1.8   % Basophils      % 0.2   % Immature Granulocytes      % 0.0   Nucleated RBCs      0 /100 0   Absolute Neutrophil      1.6 - 8.3 10e9/L 3.1   Absolute Lymphocytes      0.8 - 5.3 10e9/L 2.5   Absolute Monocytes      0.0 - 1.3 10e9/L 0.4   Absolute Eosinophils      0.0 - 0.7 10e9/L 0.1   Absolute Basophils      0.0 - 0.2 10e9/L 0.0   Abs Immature  Granulocytes      0 - 0.4 10e9/L 0.0   Absolute Nucleated RBC       0.0   Sodium      133 - 144 mmol/L 139   Potassium      3.4 - 5.3 mmol/L 4.4   Chloride      94 - 109 mmol/L 107   Carbon Dioxide      20 - 32 mmol/L 29   Anion Gap      3 - 14 mmol/L 3   Glucose      70 - 99 mg/dL 92   Urea Nitrogen      7 - 30 mg/dL 15   Creatinine      0.66 - 1.25 mg/dL 0.92   GFR Estimate      >60 mL/min/1.73:m2 89   GFR Estimate If Black      >60 mL/min/1.73:m2 >90   Calcium      8.5 - 10.1 mg/dL 9.2   Bilirubin Total      0.2 - 1.3 mg/dL 1.2   Albumin      3.4 - 5.0 g/dL 4.6   Protein Total      6.8 - 8.8 g/dL 8.5   Alkaline Phosphatase      40 - 150 U/L 62   ALT      0 - 70 U/L 53   AST      0 - 45 U/L 23   Cholesterol      <200 mg/dL 130   Triglycerides      <150 mg/dL 104   HDL Cholesterol      >39 mg/dL 42   LDL Cholesterol Calculated      <100 mg/dL 67   Non HDL Cholesterol      <130 mg/dL 88

## 2021-11-15 ENCOUNTER — MYC MEDICAL ADVICE (OUTPATIENT)
Dept: FAMILY MEDICINE | Facility: CLINIC | Age: 61
End: 2021-11-15
Payer: COMMERCIAL

## 2021-11-15 DIAGNOSIS — N52.2 DRUG-INDUCED ERECTILE DYSFUNCTION: Primary | ICD-10-CM

## 2021-11-15 RX ORDER — TADALAFIL 20 MG/1
20 TABLET ORAL DAILY PRN
Qty: 6 TABLET | Refills: 0 | Status: SHIPPED | OUTPATIENT
Start: 2021-11-15 | End: 2021-11-22

## 2021-11-15 NOTE — TELEPHONE ENCOUNTER
Spoke with patient who reports that his pharmacy is unable to fill the new script for Cialis until Jan. 1st.  Patient is wondering if he can get a supply filled at a local pharmacy until he is able to get his in January.     Didi Solorio RN

## 2021-11-16 ENCOUNTER — MYC MEDICAL ADVICE (OUTPATIENT)
Dept: FAMILY MEDICINE | Facility: CLINIC | Age: 61
End: 2021-11-16
Payer: COMMERCIAL

## 2021-11-16 ENCOUNTER — TELEPHONE (OUTPATIENT)
Dept: FAMILY MEDICINE | Facility: CLINIC | Age: 61
End: 2021-11-16
Payer: COMMERCIAL

## 2021-11-16 NOTE — LETTER

## 2021-11-16 NOTE — TELEPHONE ENCOUNTER
Date of procedure: 12/15  Colonoscopy  Surgeon: Dr. Chang  Prep:Miralax  Packet:Colonoscopy/EGD instructions were sent to the patient in [employer].   Date: 11/16/2021      Surgery Scheduler

## 2021-11-18 NOTE — TELEPHONE ENCOUNTER
It is okay for patient to stop Plavix 7 days prior to colonoscopy and resume post procedure. Please contact.  Electronically signed by Greg Schoen, MD

## 2021-11-22 ENCOUNTER — MYC MEDICAL ADVICE (OUTPATIENT)
Dept: FAMILY MEDICINE | Facility: CLINIC | Age: 61
End: 2021-11-22
Payer: COMMERCIAL

## 2021-11-22 DIAGNOSIS — Z11.59 ENCOUNTER FOR SCREENING FOR OTHER VIRAL DISEASES: ICD-10-CM

## 2021-11-22 DIAGNOSIS — N52.2 DRUG-INDUCED ERECTILE DYSFUNCTION: ICD-10-CM

## 2021-12-12 ENCOUNTER — LAB (OUTPATIENT)
Dept: LAB | Facility: CLINIC | Age: 61
End: 2021-12-12
Payer: COMMERCIAL

## 2021-12-12 DIAGNOSIS — Z11.59 ENCOUNTER FOR SCREENING FOR OTHER VIRAL DISEASES: ICD-10-CM

## 2021-12-12 LAB — SARS-COV-2 RNA RESP QL NAA+PROBE: NEGATIVE

## 2021-12-12 PROCEDURE — U0005 INFEC AGEN DETEC AMPLI PROBE: HCPCS

## 2021-12-12 PROCEDURE — U0003 INFECTIOUS AGENT DETECTION BY NUCLEIC ACID (DNA OR RNA); SEVERE ACUTE RESPIRATORY SYNDROME CORONAVIRUS 2 (SARS-COV-2) (CORONAVIRUS DISEASE [COVID-19]), AMPLIFIED PROBE TECHNIQUE, MAKING USE OF HIGH THROUGHPUT TECHNOLOGIES AS DESCRIBED BY CMS-2020-01-R: HCPCS

## 2021-12-14 ENCOUNTER — ANESTHESIA EVENT (OUTPATIENT)
Dept: GASTROENTEROLOGY | Facility: CLINIC | Age: 61
End: 2021-12-14
Payer: COMMERCIAL

## 2021-12-14 ASSESSMENT — LIFESTYLE VARIABLES: TOBACCO_USE: 1

## 2021-12-15 ENCOUNTER — ANESTHESIA (OUTPATIENT)
Dept: GASTROENTEROLOGY | Facility: CLINIC | Age: 61
End: 2021-12-15
Payer: COMMERCIAL

## 2021-12-15 ENCOUNTER — HOSPITAL ENCOUNTER (OUTPATIENT)
Facility: CLINIC | Age: 61
Discharge: HOME | End: 2021-12-15
Attending: SURGERY | Admitting: SURGERY
Payer: COMMERCIAL

## 2021-12-15 VITALS
SYSTOLIC BLOOD PRESSURE: 101 MMHG | RESPIRATION RATE: 16 BRPM | HEART RATE: 50 BPM | HEIGHT: 72 IN | DIASTOLIC BLOOD PRESSURE: 63 MMHG | BODY MASS INDEX: 28.17 KG/M2 | WEIGHT: 208 LBS | OXYGEN SATURATION: 99 % | TEMPERATURE: 97.7 F

## 2021-12-15 LAB — COLONOSCOPY: NORMAL

## 2021-12-15 PROCEDURE — 250N000009 HC RX 250: Performed by: NURSE ANESTHETIST, CERTIFIED REGISTERED

## 2021-12-15 PROCEDURE — 370N000017 HC ANESTHESIA TECHNICAL FEE, PER MIN: Performed by: SURGERY

## 2021-12-15 PROCEDURE — 250N000011 HC RX IP 250 OP 636: Performed by: NURSE ANESTHETIST, CERTIFIED REGISTERED

## 2021-12-15 PROCEDURE — 45378 DIAGNOSTIC COLONOSCOPY: CPT | Performed by: SURGERY

## 2021-12-15 PROCEDURE — G0121 COLON CA SCRN NOT HI RSK IND: HCPCS | Performed by: SURGERY

## 2021-12-15 PROCEDURE — 258N000003 HC RX IP 258 OP 636: Performed by: NURSE ANESTHETIST, CERTIFIED REGISTERED

## 2021-12-15 RX ORDER — SODIUM CHLORIDE, SODIUM LACTATE, POTASSIUM CHLORIDE, CALCIUM CHLORIDE 600; 310; 30; 20 MG/100ML; MG/100ML; MG/100ML; MG/100ML
INJECTION, SOLUTION INTRAVENOUS CONTINUOUS
Status: DISCONTINUED | OUTPATIENT
Start: 2021-12-15 | End: 2021-12-15 | Stop reason: HOSPADM

## 2021-12-15 RX ORDER — PROPOFOL 10 MG/ML
INJECTION, EMULSION INTRAVENOUS PRN
Status: DISCONTINUED | OUTPATIENT
Start: 2021-12-15 | End: 2021-12-15

## 2021-12-15 RX ORDER — LIDOCAINE 40 MG/G
CREAM TOPICAL
Status: DISCONTINUED | OUTPATIENT
Start: 2021-12-15 | End: 2021-12-15 | Stop reason: HOSPADM

## 2021-12-15 RX ORDER — LIDOCAINE HYDROCHLORIDE 20 MG/ML
INJECTION, SOLUTION INFILTRATION; PERINEURAL PRN
Status: DISCONTINUED | OUTPATIENT
Start: 2021-12-15 | End: 2021-12-15

## 2021-12-15 RX ORDER — PROPOFOL 10 MG/ML
INJECTION, EMULSION INTRAVENOUS CONTINUOUS PRN
Status: DISCONTINUED | OUTPATIENT
Start: 2021-12-15 | End: 2021-12-15

## 2021-12-15 RX ADMIN — PROPOFOL 50 MG: 10 INJECTION, EMULSION INTRAVENOUS at 09:07

## 2021-12-15 RX ADMIN — SODIUM CHLORIDE, POTASSIUM CHLORIDE, SODIUM LACTATE AND CALCIUM CHLORIDE: 600; 310; 30; 20 INJECTION, SOLUTION INTRAVENOUS at 08:18

## 2021-12-15 RX ADMIN — LIDOCAINE HYDROCHLORIDE 40 MG: 20 INJECTION, SOLUTION INFILTRATION; PERINEURAL at 09:06

## 2021-12-15 RX ADMIN — PROPOFOL 200 MCG/KG/MIN: 10 INJECTION, EMULSION INTRAVENOUS at 09:08

## 2021-12-15 ASSESSMENT — MIFFLIN-ST. JEOR: SCORE: 1776.48

## 2021-12-15 NOTE — H&P
[facility]    Pre-Endoscopy History and Physical     Leonides Paul MRN# [id number]   YOB: 1960 Age: 61 year old     Date of Procedure: 12/15/2021  Primary care provider: Schoen, Gregory G  Type of Endoscopy: Colonoscopy with possible biopsy, possible polypectomy  Reason for Procedure: screening  Type of Anesthesia Anticipated: MAC    HPI:    Leonides is a 61 year old male who will be undergoing the above procedure.  last colonoscopy 2010 - normal at the time; on plavix. no famhx of colon cancer    A history and physical has been performed. The patient's medications and allergies have been reviewed. The risks and benefits of the procedure and the sedation options and risks were discussed with the patient.  All questions were answered and informed consent was obtained.      He denies a personal or family history of anesthesia complications or bleeding disorders.     Patient Active Problem List   Diagnosis     Other psoriasis     CAD (coronary artery disease)     Hyperlipidemia with target LDL less than 100     Drug-induced erectile dysfunction     History of angina pectoris        Past Medical History:   Diagnosis Date     CAD (coronary artery disease) 7/19/2010     History of angina pectoris 3/1/2018     Hyperlipidemia LDL goal < 100 7/19/2010     Hypertension 7/19/2010     Other psoriasis 12/15/2005        Past Surgical History:   Procedure Laterality Date     COLONOSCOPY  02/10/10     HC LAPAROSCOPY, SURGICAL; APPENDECTOMY  7/17/10     REMOVAL OF SPERM DUCT(S)         Social History     Tobacco Use     Smoking status: Former Smoker     Packs/day: 2.00     Years: 12.00     Pack years: 24.00     Smokeless tobacco: Never Used     Tobacco comment: Quit at age 25   Substance Use Topics     Alcohol use: Yes     Comment: beer weekly       Family History   Problem Relation Age of Onset     Allergies Daughter         environment al     Allergies Son         same as sister     Alzheimer  Disease Father          at age 73     Blood Disease Father      Cancer Maternal Grandfather         [place] lung -     Heart Disease Maternal Grandmother      Psychotic Disorder Brother        Prior to Admission medications    Medication Sig Start Date End Date Taking? Authorizing Provider   clopidogrel (PLAVIX) 75 MG tablet Take 1 tablet (75 mg) by mouth daily 21  Yes Tay Hernandez MD   ACE/ARB NOT PRESCRIBED, INTENTIONAL, by Other route continuous prn.  Patient not taking: Reported on 2021   Schoen, Gregory G, MD   amLODIPine (NORVASC) 5 MG tablet Take 1 tablet (5 mg) by mouth daily 21   Tay Hernandez MD   atorvastatin (LIPITOR) 80 MG tablet Take 1 tablet (80 mg) by mouth daily 21   Tay Hernandez MD   clobetasol propionate (OLUX) 0.05 % external foam  19   Reported, Patient   ezetimibe (ZETIA) 10 MG tablet Take 1 tablet (10 mg) by mouth daily 21   Tay Hernandez MD   guselkumab (TREMFYA) 100 MG/ML SOSY Inject 100 mg Subcutaneous once    Reported, Patient   metoprolol succinate ER (TOPROL XL) 50 MG 24 hr tablet Take 1 tablet (50 mg) by mouth daily 21   aTy Hernandez MD   tadalafil (CIALIS) 20 MG tablet Take 1 tablet (20 mg) by mouth daily as needed 21   Schoen, Gregory G, MD       Allergies   Allergen Reactions     No Known Drug Allergies         REVIEW OF SYSTEMS:   5 point ROS negative except as noted above in HPI, including Gen., Resp., CV, GI &  system review.    PHYSICAL EXAM:   /89   Temp 97.7  F (36.5  C) (Oral)   Resp 16   Ht 1.829 m (6')   Wt 94.3 kg (208 lb)   SpO2 98%   BMI 28.21 kg/m   Estimated body mass index is 28.21 kg/m  as calculated from the following:    Height as of this encounter: 1.829 m (6').    Weight as of this encounter: 94.3 kg (208 lb).   Constitutional: Awake, alert, no acute distress.  Eyes: No scleral icterus.  Conjunctiva are without injection.  ENMT: Mucous membranes moist, dentition and gums  are intact.   Neck: Soft, supple, trachea midline.    Endocrine: n/a   Lymphatic: There is no cervical, submandibularadenopathy.  Respiratory: normal effortgs   Cardiovascular: S1, S2  Abdomen: Non-distended, non-tender,  No masses,  Musculoskeletal: No spinal or CVA tenderness. Full range of motion in the upper and lower extremities.    Skin: No skin rashes or lesions to inspection.  No petechia.    Neurologic: alerted and oriented 3x  Psychiatric: The patient's affect is not blunted and mood is appropriate.  DIAGNOSTICS:    Not indicated    IMPRESSION   ASA Class 2 - Mild systemic disease    PLAN:   Plan for Colonoscopy with possible biopsy, possible polypectomy. We discussed the risks, benefits and alternatives and the patient wished to proceed.  Patient is cleared for the above procedure.    The above has been forwarded to the consulting provider.    [facility], [facility]

## 2021-12-15 NOTE — ANESTHESIA PREPROCEDURE EVALUATION
Anesthesia Pre-Procedure Evaluation    Patient: Leonides Paul   MRN: [id number] : 1960        Preoperative Diagnosis: Special screening for malignant neoplasms, colon [Z12.11]    Procedure : Procedure(s):  COLONOSCOPY          Past Medical History:   Diagnosis Date     CAD (coronary artery disease) 2010     History of angina pectoris 3/1/2018     Hyperlipidemia LDL goal < 100 2010     Hypertension 2010     Other psoriasis 12/15/2005      Past Surgical History:   Procedure Laterality Date     COLONOSCOPY  02/10/10     HC LAPAROSCOPY, SURGICAL; APPENDECTOMY  7/17/10     REMOVAL OF SPERM DUCT(S)        Allergies   Allergen Reactions     No Known Drug Allergies       Social History     Tobacco Use     Smoking status: Former Smoker     Packs/day: 2.00     Years: 12.00     Pack years: 24.00     Smokeless tobacco: Never Used     Tobacco comment: Quit at age 25   Substance Use Topics     Alcohol use: Yes     Comment: beer weekly      Wt Readings from Last 1 Encounters:   21 94.6 kg (208 lb 8 oz)        Anesthesia Evaluation   Pt has had prior anesthetic. Type: MAC and General.    No history of anesthetic complications       ROS/MED HX  ENT/Pulmonary:     (+) tobacco use, Past use,     Neurologic:  - neg neurologic ROS     Cardiovascular:     (+) Dyslipidemia hypertension--CAD angina---Previous cardiac testing   Echo: Date: Results:    Stress Test: Date:  Results:  IMPRESSIONS:   I   Adenosine Test   1. Adenosine infusion dictated under separate cover.   2. ECG report done under separate cover.       II  Myocardial Perfusion Scintigraphy   1. Myocardial perfusion using single isotope technique demonstrated a   moderate adenosine induced ischemic defect in the anterior wall   involving approximately 15% of the myocardial mass with complete   reversibility.     2. Gating demonstrated an adenosine induced wall motion abnormality   extending from the mid-anterior wall to the apex.     3. The  ejection fraction was 53% at rest and 52% post-stress.   4. Comparing this study to the previous study dated 11/04/2004, the   anterior defect is once again seen and the inferior and inferoseptal   defect is no longer clearly visualized.  The ejection fraction has   improved slightly as it was previously noted to be 50%.  Also noted   at that time was the laurent-apical wall motion abnormality with   exercise.   ECG Reviewed: Date: 2010 Results:  NSR  Cath: Date: Results:      METS/Exercise Tolerance:     Hematologic:  - neg hematologic  ROS     Musculoskeletal:  - neg musculoskeletal ROS     GI/Hepatic:  - neg GI/hepatic ROS     Renal/Genitourinary:  - neg Renal ROS     Endo:  - neg endo ROS     Psychiatric/Substance Use:  - neg psychiatric ROS     Infectious Disease:  - neg infectious disease ROS     Malignancy:  - neg malignancy ROS     Other:  - neg other ROS          Physical Exam    Airway  airway exam normal      Mallampati: II   TM distance: > 3 FB   Neck ROM: full   Mouth opening: > 3 cm    Respiratory Devices and Support         Dental       (+) caps      Cardiovascular   cardiovascular exam normal       Rhythm and rate: regular and normal     Pulmonary   pulmonary exam normal        breath sounds clear to auscultation           OUTSIDE LABS:  CBC:   Lab Results   Component Value Date    WBC 6.2 05/04/2021    WBC 6.9 11/09/2020    HGB 16.5 05/04/2021    HGB 15.4 11/09/2020    HCT 47.9 05/04/2021    HCT 43.0 11/09/2020     05/04/2021     11/09/2020     BMP:   Lab Results   Component Value Date     05/04/2021     11/09/2020    POTASSIUM 4.4 05/04/2021    POTASSIUM 3.8 11/09/2020    CHLORIDE 107 05/04/2021    CHLORIDE 108 11/09/2020    CO2 29 05/04/2021    CO2 27 11/09/2020    BUN 15 05/04/2021    BUN 18 11/09/2020    CR 0.92 05/04/2021    CR 0.93 11/09/2020    GLC 92 05/04/2021     (H) 11/09/2020     COAGS: No results found for: PTT, INR, FIBR  POC: No results found for: BGM,  HCG, HCGS  HEPATIC:   Lab Results   Component Value Date    ALBUMIN 4.6 05/04/2021    PROTTOTAL 8.5 05/04/2021    ALT 53 05/04/2021    AST 23 05/04/2021    ALKPHOS 62 05/04/2021    BILITOTAL 1.2 05/04/2021     OTHER:   Lab Results   Component Value Date    JUAN JOSE 9.2 05/04/2021    AMYLASE 63 07/17/2010       Anesthesia Plan    ASA Status:  2   NPO Status:  NPO Appropriate    Anesthesia Type: MAC.      Maintenance: TIVA.        Consents    Anesthesia Plan(s) and associated risks, benefits, and realistic alternatives discussed. Questions answered and patient/representative(s) expressed understanding.    - Discussed:     - Discussed with:  Patient    Use of blood products discussed: No .     Postoperative Care            Comments:    Other Comments: The risks and benefits of anesthesia, and the alternatives where applicable, have been discussed with the patient, and they wish to proceed.            Dillon Smith, APRN CRNA

## 2021-12-15 NOTE — ANESTHESIA POSTPROCEDURE EVALUATION
Patient: Leonides Paul    Procedure: Procedure(s):  COLONOSCOPY       Diagnosis:Special screening for malignant neoplasms, colon [Z12.11]  Diagnosis Additional Information: No value filed.    Anesthesia Type:  MAC    Note:  Disposition: Outpatient   Postop Pain Control: Uneventful            Sign Out: Well controlled pain   PONV: No   Neuro/Psych: Uneventful            Sign Out: Acceptable/Baseline neuro status   Airway/Respiratory: Uneventful            Sign Out: Acceptable/Baseline resp. status   CV/Hemodynamics: Uneventful            Sign Out: Acceptable CV status   Other NRE: NONE   DID A NON-ROUTINE EVENT OCCUR? No    Event details/Postop Comments:  Pt was happy with anesthesia care.  No complications.  I will follow up with the pt if needed.           Last vitals:  Vitals Value Taken Time   /69 12/15/21 0950   Temp     Pulse 66 12/15/21 0950   Resp     SpO2 85 % 12/15/21 0947   Vitals shown include unvalidated device data.    Electronically Signed By: BRUCE Guido CRNA  December 15, 2021  10:26 AM

## 2021-12-15 NOTE — DISCHARGE INSTRUCTIONS
[facility]    Home Care Following Endoscopy  Dr. Chang          Activity:    You have just undergone an endoscopic procedure usually performed with conscious sedation.  Do not work or operate machinery (including a car) for at least 12 hours.      I encourage you to walk and attempt to pass this air as soon as possible.    Diet:    Return to the diet you were on before your procedure but eat lightly for the first 12-24 hours.    Drink plenty of water.    Resume any regular medications unless otherwise advised by your physician.  Please begin any new medication prescribed as a result of your procedure as directed by your physician.     If you had any biopsy or polyp removed please refrain from aspirin or aspirin products for 2 days.  If on Coumadin please restart as instructed by your physician.   Pain:    You may take Tylenol as needed for pain.  Expected Recovery:    You can expect some mild abdominal fullness and/or discomfort due to the air used to inflate your intestinal tract. It is also normal to have a mild sore throat after upper endoscopy.    Call Your Physician if You Have:    After Colonoscopy:  o Worsening persisting abdominal pain which is worse with activity.  o Fevers (>101 degrees F), chills or shakes.  o Passage of continued blood with bowel movements.   Any questions or concerns about your recovery, please call [phone] or after hours [phone] Nurse Advice Line.    Follow-up Care:    You should receive a call or letter with your results within 1 week. Please call if you have not received a notification of your results.  If asked to return to clinic please make an appointment 1 week after your procedure.  Call [phone].

## 2021-12-15 NOTE — ANESTHESIA CARE TRANSFER NOTE
Patient: Leonides Paul    Procedure: Procedure(s):  COLONOSCOPY       Diagnosis: Special screening for malignant neoplasms, colon [Z12.11]  Diagnosis Additional Information: No value filed.    Anesthesia Type:   MAC     Note:    Oropharynx: oropharynx clear of all foreign objects and spontaneously breathing  Level of Consciousness: drowsy  Oxygen Supplementation: face mask    Independent Airway: airway patency satisfactory and stable  Dentition: dentition unchanged  Vital Signs Stable: post-procedure vital signs reviewed and stable  Report to RN Given: handoff report given  Patient transferred to: Phase II    Handoff Report: Identifed the Patient, Identified the Reponsible Provider, Reviewed the pertinent medical history, Discussed the surgical course, Reviewed Intra-OP anesthesia mangement and issues during anesthesia, Set expectations for post-procedure period and Allowed opportunity for questions and acknowledgement of understanding      Vitals:  Vitals Value Taken Time   BP 96/58 12/15/21 0932   Temp     Pulse 50 12/15/21 0932   Resp     SpO2 99 % 12/15/21 0939   Vitals shown include unvalidated device data.    Electronically Signed By: BRUCE Guido CRNA  December 15, 2021  9:40 AM

## 2022-01-12 ENCOUNTER — ALLIED HEALTH/NURSE VISIT (OUTPATIENT)
Dept: FAMILY MEDICINE | Facility: CLINIC | Age: 62
End: 2022-01-12
Payer: COMMERCIAL

## 2022-01-12 DIAGNOSIS — Z23 NEED FOR VACCINATION: Primary | ICD-10-CM

## 2022-01-12 PROCEDURE — 90750 HZV VACC RECOMBINANT IM: CPT

## 2022-01-12 PROCEDURE — 99207 PR NO CHARGE LOS: CPT

## 2022-01-12 PROCEDURE — 90471 IMMUNIZATION ADMIN: CPT

## 2022-01-12 NOTE — PROGRESS NOTES
Prior to immunization administration, verified patients identity using patient s name and date of birth. Please see Immunization Activity for additional information.     Screening Questionnaire for Adult Immunization    Are you sick today?   No   Do you have allergies to medications, food, a vaccine component or latex?   No   Have you ever had a serious reaction after receiving a vaccination?   No   Do you have a long-term health problem with heart, lung, kidney, or metabolic disease (e.g., diabetes), asthma, a blood disorder, no spleen, complement component deficiency, a cochlear implant, or a spinal fluid leak?  Are you on long-term aspirin therapy?   No   Do you have cancer, leukemia, HIV/AIDS, or any other immune system problem?   No   Do you have a parent, brother, or sister with an immune system problem?   No   In the past 3 months, have you taken medications that affect  your immune system, such as prednisone, other steroids, or anticancer drugs; drugs for the treatment of rheumatoid arthritis, Crohn s disease, or psoriasis; or have you had radiation treatments?   YES   Have you had a seizure, or a brain or other nervous system problem?   No   During the past year, have you received a transfusion of blood or blood    products, or been given immune (gamma) globulin or antiviral drug?   No   For women: Are you pregnant or is there a chance you could become       pregnant during the next month?   No   Have you received any vaccinations in the past 4 weeks?   No     Immunization questionnaire was positive for at least one answer.  Notified Dr Khanna and said it was ok to give vaccine because it been 2 months since his last dose of medication that suppresses his immune system.          Patient instructed to remain in clinic for 15 minutes afterwards, and to report any adverse reaction to me immediately.       Screening performed by Supriya Paniagua on 1/12/2022 at 10:21 AM.

## 2022-02-14 DIAGNOSIS — E78.5 HYPERLIPIDEMIA LDL GOAL <70: ICD-10-CM

## 2022-02-14 RX ORDER — ATORVASTATIN CALCIUM 80 MG/1
80 TABLET, FILM COATED ORAL DAILY
Qty: 90 TABLET | Refills: 0 | Status: SHIPPED | OUTPATIENT
Start: 2022-02-14 | End: 2022-04-19

## 2022-02-14 NOTE — TELEPHONE ENCOUNTER
[facility] Refill Guideline reviewed.  Medication meets criteria for refill. Lipitor 80mg sent to [facility].

## 2022-03-22 DIAGNOSIS — E78.5 HYPERLIPIDEMIA LDL GOAL <70: ICD-10-CM

## 2022-03-22 RX ORDER — EZETIMIBE 10 MG/1
10 TABLET ORAL DAILY
Qty: 90 TABLET | Refills: 0 | Status: SHIPPED | OUTPATIENT
Start: 2022-03-22 | End: 2022-04-19

## 2022-04-09 ENCOUNTER — HEALTH MAINTENANCE LETTER (OUTPATIENT)
Age: 62
End: 2022-04-09

## 2022-04-19 ENCOUNTER — LAB (OUTPATIENT)
Dept: LAB | Facility: CLINIC | Age: 62
End: 2022-04-19
Payer: COMMERCIAL

## 2022-04-19 ENCOUNTER — OFFICE VISIT (OUTPATIENT)
Dept: CARDIOLOGY | Facility: CLINIC | Age: 62
End: 2022-04-19
Attending: INTERNAL MEDICINE
Payer: COMMERCIAL

## 2022-04-19 VITALS
OXYGEN SATURATION: 96 % | WEIGHT: 215.3 LBS | DIASTOLIC BLOOD PRESSURE: 66 MMHG | HEART RATE: 57 BPM | SYSTOLIC BLOOD PRESSURE: 104 MMHG | HEIGHT: 72 IN | BODY MASS INDEX: 29.16 KG/M2

## 2022-04-19 DIAGNOSIS — I25.10 CORONARY ARTERY DISEASE INVOLVING NATIVE CORONARY ARTERY OF NATIVE HEART WITHOUT ANGINA PECTORIS: ICD-10-CM

## 2022-04-19 DIAGNOSIS — E78.5 HYPERLIPIDEMIA LDL GOAL <70: ICD-10-CM

## 2022-04-19 DIAGNOSIS — I10 BENIGN ESSENTIAL HYPERTENSION: ICD-10-CM

## 2022-04-19 DIAGNOSIS — I25.118 CORONARY ARTERY DISEASE OF NATIVE ARTERY OF NATIVE HEART WITH STABLE ANGINA PECTORIS (H): Primary | ICD-10-CM

## 2022-04-19 LAB
ALT SERPL W P-5'-P-CCNC: 65 U/L (ref 0–70)
ANION GAP SERPL CALCULATED.3IONS-SCNC: 3 MMOL/L (ref 3–14)
BUN SERPL-MCNC: 14 MG/DL (ref 7–30)
CALCIUM SERPL-MCNC: 8.9 MG/DL (ref 8.5–10.1)
CHLORIDE BLD-SCNC: 108 MMOL/L (ref 94–109)
CHOLEST SERPL-MCNC: 109 MG/DL
CO2 SERPL-SCNC: 30 MMOL/L (ref 20–32)
CREAT SERPL-MCNC: 0.97 MG/DL (ref 0.66–1.25)
FASTING STATUS PATIENT QL REPORTED: YES
GFR SERPL CREATININE-BSD FRML MDRD: 88 ML/MIN/1.73M2
GLUCOSE BLD-MCNC: 101 MG/DL (ref 70–99)
HDLC SERPL-MCNC: 36 MG/DL
LDLC SERPL CALC-MCNC: 55 MG/DL
NONHDLC SERPL-MCNC: 73 MG/DL
POTASSIUM BLD-SCNC: 4.4 MMOL/L (ref 3.4–5.3)
SODIUM SERPL-SCNC: 141 MMOL/L (ref 133–144)
TRIGL SERPL-MCNC: 90 MG/DL

## 2022-04-19 PROCEDURE — 84460 ALANINE AMINO (ALT) (SGPT): CPT | Performed by: INTERNAL MEDICINE

## 2022-04-19 PROCEDURE — 36415 COLL VENOUS BLD VENIPUNCTURE: CPT

## 2022-04-19 PROCEDURE — 80048 BASIC METABOLIC PNL TOTAL CA: CPT | Performed by: INTERNAL MEDICINE

## 2022-04-19 PROCEDURE — 99214 OFFICE O/P EST MOD 30 MIN: CPT | Performed by: INTERNAL MEDICINE

## 2022-04-19 PROCEDURE — 80061 LIPID PANEL: CPT | Performed by: INTERNAL MEDICINE

## 2022-04-19 RX ORDER — ATORVASTATIN CALCIUM 80 MG/1
80 TABLET, FILM COATED ORAL DAILY
Qty: 90 TABLET | Refills: 3 | Status: SHIPPED | OUTPATIENT
Start: 2022-04-19 | End: 2022-12-13

## 2022-04-19 RX ORDER — EZETIMIBE 10 MG/1
10 TABLET ORAL DAILY
Qty: 90 TABLET | Refills: 3 | Status: SHIPPED | OUTPATIENT
Start: 2022-04-19 | End: 2023-01-04

## 2022-04-19 RX ORDER — METOPROLOL SUCCINATE 50 MG/1
50 TABLET, EXTENDED RELEASE ORAL DAILY
Qty: 90 TABLET | Refills: 3 | Status: SHIPPED | OUTPATIENT
Start: 2022-04-19 | End: 2022-12-13

## 2022-04-19 RX ORDER — AMLODIPINE BESYLATE 5 MG/1
5 TABLET ORAL DAILY
Qty: 90 TABLET | Refills: 3 | Status: SHIPPED | OUTPATIENT
Start: 2022-04-19 | End: 2022-12-13

## 2022-04-19 RX ORDER — CLOPIDOGREL BISULFATE 75 MG/1
75 TABLET ORAL DAILY
Qty: 90 TABLET | Refills: 3 | Status: SHIPPED | OUTPATIENT
Start: 2022-04-19 | End: 2022-12-13

## 2022-04-19 NOTE — LETTER
4/19/2022    Gregory G. Schoen, MD  [address] [place] [address]    RE: Leonides Paul       Dear Colleague,     I had the pleasure of seeing Leonides [facility] in the [facility].  HPI and Plan:   See dictation    Today's clinic visit entailed:  Review of the result(s) of each unique test - bmp, alt, flp  Ordering of each unique test  Prescription drug management  30 minutes spent on the date of the encounter doing chart review, review of test results, patient visit and documentation   Provider  Link to MDM Help Grid     The level of medical decision making during this visit was of moderate complexity.      Orders Placed This Encounter   Procedures     Basic metabolic panel     Lipid Profile     ALT     Follow-Up with Cardiology       Orders Placed This Encounter   Medications     metoprolol succinate ER (TOPROL XL) 50 MG 24 hr tablet     Sig: Take 1 tablet (50 mg) by mouth daily     Dispense:  90 tablet     Refill:  3     ezetimibe (ZETIA) 10 MG tablet     Sig: Take 1 tablet (10 mg) by mouth daily     Dispense:  90 tablet     Refill:  3     clopidogrel (PLAVIX) 75 MG tablet     Sig: Take 1 tablet (75 mg) by mouth daily     Dispense:  90 tablet     Refill:  3     atorvastatin (LIPITOR) 80 MG tablet     Sig: Take 1 tablet (80 mg) by mouth daily     Dispense:  90 tablet     Refill:  3     amLODIPine (NORVASC) 5 MG tablet     Sig: Take 1 tablet (5 mg) by mouth daily     Dispense:  90 tablet     Refill:  3       Medications Discontinued During This Encounter   Medication Reason     metoprolol succinate ER (TOPROL XL) 50 MG 24 hr tablet Reorder     amLODIPine (NORVASC) 5 MG tablet Reorder     clopidogrel (PLAVIX) 75 MG tablet Reorder     atorvastatin (LIPITOR) 80 MG tablet Reorder     ezetimibe (ZETIA) 10 MG tablet Reorder         Encounter Diagnoses   Name Primary?     Coronary artery disease of native artery of native heart with stable angina pectoris (H) Yes     Hyperlipidemia LDL goal  <70      Benign essential hypertension      Coronary artery disease involving native coronary artery of native heart without angina pectoris        CURRENT MEDICATIONS:  Current Outpatient Medications   Medication Sig Dispense Refill     amLODIPine (NORVASC) 5 MG tablet Take 1 tablet (5 mg) by mouth daily 90 tablet 3     atorvastatin (LIPITOR) 80 MG tablet Take 1 tablet (80 mg) by mouth daily 90 tablet 3     clobetasol propionate (OLUX) 0.05 % external foam        clopidogrel (PLAVIX) 75 MG tablet Take 1 tablet (75 mg) by mouth daily 90 tablet 3     ezetimibe (ZETIA) 10 MG tablet Take 1 tablet (10 mg) by mouth daily 90 tablet 3     guselkumab (TREMFYA) 100 MG/ML SOSY Inject 100 mg Subcutaneous once       metoprolol succinate ER (TOPROL XL) 50 MG 24 hr tablet Take 1 tablet (50 mg) by mouth daily 90 tablet 3     tadalafil (CIALIS) 20 MG tablet Take 1 tablet (20 mg) by mouth daily as needed 24 tablet 3     ACE/ARB NOT PRESCRIBED, INTENTIONAL, by Other route continuous prn. (Patient not taking: No sig reported)         ALLERGIES     Allergies   Allergen Reactions     No Known Drug Allergies        PAST MEDICAL HISTORY:  Past Medical History:   Diagnosis Date     CAD (coronary artery disease) 2010     History of angina pectoris 3/1/2018     Hyperlipidemia LDL goal < 100 2010     Hypertension 2010     Other psoriasis 12/15/2005       PAST SURGICAL HISTORY:  Past Surgical History:   Procedure Laterality Date     COLONOSCOPY  02/10/10     COLONOSCOPY N/A 12/15/2021    Procedure: COLONOSCOPY;  Surgeon: Rehan Chang MD;  Location:  GI     HC LAPAROSCOPY, SURGICAL; APPENDECTOMY  7/17/10     REMOVAL OF SPERM DUCT(S)         FAMILY HISTORY:  Family History   Problem Relation Age of Onset     Allergies Daughter         environment al     Allergies Son         same as sister     Alzheimer Disease Father          at age 73     Blood Disease Father      Cancer Maternal Grandfather         [place] lung -      Heart Disease Maternal Grandmother      Psychotic Disorder Brother        SOCIAL HISTORY:  Social History     Socioeconomic History     Marital status:      Spouse name: None     Number of children: None     Years of education: None     Highest education level: None   Tobacco Use     Smoking status: Former Smoker     Packs/day: 2.00     Years: 12.00     Pack years: 24.00     Smokeless tobacco: Never Used     Tobacco comment: Quit at age 25   Substance and Sexual Activity     Alcohol use: Yes     Comment: beer weekly     Drug use: No     Sexual activity: Yes     Partners: Female       Review of Systems:  Skin:          Eyes:         ENT:         Respiratory:  Negative shortness of breath;cough;wheezing     Cardiovascular:  Negative;palpitations;chest pain;dizziness;lightheadedness;edema;syncope or near-syncope      Gastroenterology:        Genitourinary:         Musculoskeletal:         Neurologic:  Negative numbness or tingling of hands;numbness or tingling of feet    Psychiatric:         Heme/Lymph/Imm:         Endocrine:           Physical Exam:  Vitals: /66 (BP Location: Right arm, Patient Position: Sitting, Cuff Size: Adult Large)   Pulse 57   Ht 1.829 m (6')   Wt 97.7 kg (215 lb 4.8 oz)   SpO2 96%   BMI 29.20 kg/m      Constitutional:           Skin:             Head:           Eyes:           Lymph:      ENT:           Neck:           Respiratory:            Cardiac:                                                           GI:           Extremities and Muscular Skeletal:                 Neurological:           Psych:           CC  Tay Hernandez MD  [address] [facility]  [place] [address]    Thank you for allowing me to participate in the care of your patient.      Sincerely,     TAY HERNANDEZ MD     [facility]

## 2022-04-19 NOTE — PROGRESS NOTES
Service Date: 2022    Gregory G Schoen, MD   [facility]  [address] [place] [address]    RE:      Leonides Paul  MRN:  [id number]  :   1960    Dear Juan:    I had the opportunity to see Mr. Leonides Paul in Cardiology Clinic today at [facility] in [place] for reevaluation of coronary artery disease.  As you know, I have been seeing Leonides for many years.  He has a chronically occluded mid LAD which is supplied by collaterals.  We have been managing this medically and in the past, he has had some symptoms of chronic stable angina.  Recently, he has not had any anginal symptoms, possibly because of his exercise levels.  He has not been quite as active over the winter months and was not able to play golf consistently last summer because of shoulder problems.  He is now feeling well and is looking forward to getting back out on the golf course.    He has no concerning symptoms of shortness of breath, palpitations, lightheadedness or syncope.  His cholesterol numbers remain excellent and his blood pressure has been under good control.  I reviewed his cholesterol numbers, basic metabolic panel and ALT with him which all look great.    PHYSICAL EXAMINATION:    VITAL SIGNS:  Today, his blood pressure is 104/66, heart rate 57 and weight 215 pounds.    RESPIRATORY:  His lungs are clear.    CARDIAC:  His heart rhythm is regular.  He has no cardiac murmurs or carotid bruits.    IMPRESSIONS:  Mr. Leonides Paul is a 61-year-old gentleman with coronary artery disease and chronic total occlusion of mid LAD with collaterals.  He may still have some chronic stable angina at higher exercise workloads but this is to be expected.  He has been managing that situation for a number of years now.  His risk factors are under excellent control and I will not make any medication changes.  I plan to see him back again in 1 year for reevaluation of these  issues.    Sincerely,    Tay Hernandez MD, [facility]        D: 2022   T: 2022   MT: haley    Name:     JIM FERNANDEZ  MRN:      [id number]        Account:      [id number]   :      1960           Service Date: 2022       Document: [id number]

## 2022-04-19 NOTE — PROGRESS NOTES
HPI and Plan:   See dictation    Today's clinic visit entailed:  Review of the result(s) of each unique test - bmp, alt, flp  Ordering of each unique test  Prescription drug management  30 minutes spent on the date of the encounter doing chart review, review of test results, patient visit and documentation   Provider  Link to [facility] Help Grid     The level of medical decision making during this visit was of moderate complexity.      Orders Placed This Encounter   Procedures     Basic metabolic panel     Lipid Profile     ALT     Follow-Up with Cardiology       Orders Placed This Encounter   Medications     metoprolol succinate ER (TOPROL XL) 50 MG 24 hr tablet     Sig: Take 1 tablet (50 mg) by mouth daily     Dispense:  90 tablet     Refill:  3     ezetimibe (ZETIA) 10 MG tablet     Sig: Take 1 tablet (10 mg) by mouth daily     Dispense:  90 tablet     Refill:  3     clopidogrel (PLAVIX) 75 MG tablet     Sig: Take 1 tablet (75 mg) by mouth daily     Dispense:  90 tablet     Refill:  3     atorvastatin (LIPITOR) 80 MG tablet     Sig: Take 1 tablet (80 mg) by mouth daily     Dispense:  90 tablet     Refill:  3     amLODIPine (NORVASC) 5 MG tablet     Sig: Take 1 tablet (5 mg) by mouth daily     Dispense:  90 tablet     Refill:  3       Medications Discontinued During This Encounter   Medication Reason     metoprolol succinate ER (TOPROL XL) 50 MG 24 hr tablet Reorder     amLODIPine (NORVASC) 5 MG tablet Reorder     clopidogrel (PLAVIX) 75 MG tablet Reorder     atorvastatin (LIPITOR) 80 MG tablet Reorder     ezetimibe (ZETIA) 10 MG tablet Reorder         Encounter Diagnoses   Name Primary?     Coronary artery disease of native artery of native heart with stable angina pectoris (H) Yes     Hyperlipidemia LDL goal <70      Benign essential hypertension      Coronary artery disease involving native coronary artery of native heart without angina pectoris        CURRENT MEDICATIONS:  Current Outpatient Medications    Medication Sig Dispense Refill     amLODIPine (NORVASC) 5 MG tablet Take 1 tablet (5 mg) by mouth daily 90 tablet 3     atorvastatin (LIPITOR) 80 MG tablet Take 1 tablet (80 mg) by mouth daily 90 tablet 3     clobetasol propionate (OLUX) 0.05 % external foam        clopidogrel (PLAVIX) 75 MG tablet Take 1 tablet (75 mg) by mouth daily 90 tablet 3     ezetimibe (ZETIA) 10 MG tablet Take 1 tablet (10 mg) by mouth daily 90 tablet 3     guselkumab (TREMFYA) 100 MG/ML SOSY Inject 100 mg Subcutaneous once       metoprolol succinate ER (TOPROL XL) 50 MG 24 hr tablet Take 1 tablet (50 mg) by mouth daily 90 tablet 3     tadalafil (CIALIS) 20 MG tablet Take 1 tablet (20 mg) by mouth daily as needed 24 tablet 3     ACE/ARB NOT PRESCRIBED, INTENTIONAL, by Other route continuous prn. (Patient not taking: No sig reported)         ALLERGIES     Allergies   Allergen Reactions     No Known Drug Allergies        PAST MEDICAL HISTORY:  Past Medical History:   Diagnosis Date     CAD (coronary artery disease) 2010     History of angina pectoris 3/1/2018     Hyperlipidemia LDL goal < 100 2010     Hypertension 2010     Other psoriasis 12/15/2005       PAST SURGICAL HISTORY:  Past Surgical History:   Procedure Laterality Date     COLONOSCOPY  02/10/10     COLONOSCOPY N/A 12/15/2021    Procedure: COLONOSCOPY;  Surgeon: Rehan Chang MD;  Location:  GI      LAPAROSCOPY, SURGICAL; APPENDECTOMY  7/17/10     REMOVAL OF SPERM DUCT(S)         FAMILY HISTORY:  Family History   Problem Relation Age of Onset     Allergies Daughter         environment al     Allergies Son         same as sister     Alzheimer Disease Father          at age 73     Blood Disease Father      Cancer Maternal Grandfather         [place] lung -     Heart Disease Maternal Grandmother      Psychotic Disorder Brother        SOCIAL HISTORY:  Social History     Socioeconomic History     Marital status:      Spouse name: None     Number of  children: None     Years of education: None     Highest education level: None   Tobacco Use     Smoking status: Former Smoker     Packs/day: 2.00     Years: 12.00     Pack years: 24.00     Smokeless tobacco: Never Used     Tobacco comment: Quit at age 25   Substance and Sexual Activity     Alcohol use: Yes     Comment: beer weekly     Drug use: No     Sexual activity: Yes     Partners: Female       Review of Systems:  Skin:          Eyes:         ENT:         Respiratory:  Negative shortness of breath;cough;wheezing     Cardiovascular:  Negative;palpitations;chest pain;dizziness;lightheadedness;edema;syncope or near-syncope      Gastroenterology:        Genitourinary:         Musculoskeletal:         Neurologic:  Negative numbness or tingling of hands;numbness or tingling of feet    Psychiatric:         Heme/Lymph/Imm:         Endocrine:           Physical Exam:  Vitals: /66 (BP Location: Right arm, Patient Position: Sitting, Cuff Size: Adult Large)   Pulse 57   Ht 1.829 m (6')   Wt 97.7 kg (215 lb 4.8 oz)   SpO2 96%   BMI 29.20 kg/m      Constitutional:           Skin:             Head:           Eyes:           Lymph:      ENT:           Neck:           Respiratory:            Cardiac:                                                           GI:           Extremities and Muscular Skeletal:                 Neurological:           Psych:           CC  Tay Hernandez MD  [id number]  [place] [id number]

## 2022-08-22 DIAGNOSIS — N52.2 DRUG-INDUCED ERECTILE DYSFUNCTION: ICD-10-CM

## 2022-08-24 ENCOUNTER — MYC MEDICAL ADVICE (OUTPATIENT)
Dept: FAMILY MEDICINE | Facility: CLINIC | Age: 62
End: 2022-08-24

## 2022-08-24 RX ORDER — TADALAFIL 20 MG/1
TABLET ORAL
Qty: 24 TABLET | Refills: 3 | Status: SHIPPED | OUTPATIENT
Start: 2022-08-24 | End: 2022-12-23

## 2022-08-24 NOTE — TELEPHONE ENCOUNTER
Refill request came in from pharmacy on 8/22. Informed patient via [employer].     JAKOB RomanN, RN

## 2022-08-24 NOTE — TELEPHONE ENCOUNTER
Prescription approved per [facility] Refill Protocol.    Farzana Palacio,BSN, RN

## 2022-10-09 ENCOUNTER — HEALTH MAINTENANCE LETTER (OUTPATIENT)
Age: 62
End: 2022-10-09

## 2022-10-10 ENCOUNTER — TELEPHONE (OUTPATIENT)
Dept: FAMILY MEDICINE | Facility: CLINIC | Age: 62
End: 2022-10-10

## 2022-10-10 NOTE — TELEPHONE ENCOUNTER
Reason for Call:  Other appointment and call back    Detailed comments: Patient is looking to schedule with his primary for annual exam. Please advise patient tried to schedule online in over the phone, not scheduling options. Patient is looking to an update about is PCP, please advise patient would like a call back.     Phone Number Patient can be reached at: Home number on file [phone] (home)    Best Time: any    Can we leave a detailed message on this number? YES    Call taken on 10/10/2022 at 8:41 AM by Felisa Quinones

## 2022-10-11 NOTE — TELEPHONE ENCOUNTER
I spoke with patient and made an appointment with Dr. Schoen for a Yearly Px along with a covid booster, Flu shot and the last of three in his Hep B series.    Apt.date October 28th @ 3:30 pm    Judy MITCHELL     [facility]

## 2022-10-24 ASSESSMENT — ENCOUNTER SYMPTOMS
HEADACHES: 0
CONSTIPATION: 0
CHILLS: 0
HEARTBURN: 0
ABDOMINAL PAIN: 0
WEAKNESS: 0
MYALGIAS: 0
HEMATOCHEZIA: 0
FREQUENCY: 0
COUGH: 0
NERVOUS/ANXIOUS: 0
ARTHRALGIAS: 0
DIZZINESS: 0
PALPITATIONS: 0
NAUSEA: 0
JOINT SWELLING: 0
DYSURIA: 0
PARESTHESIAS: 0
HEMATURIA: 0
FEVER: 0
SHORTNESS OF BREATH: 0
EYE PAIN: 0
SORE THROAT: 0
DIARRHEA: 0

## 2022-10-28 ENCOUNTER — OFFICE VISIT (OUTPATIENT)
Dept: FAMILY MEDICINE | Facility: CLINIC | Age: 62
End: 2022-10-28
Payer: COMMERCIAL

## 2022-10-28 VITALS
TEMPERATURE: 97.9 F | WEIGHT: 201.6 LBS | HEART RATE: 53 BPM | BODY MASS INDEX: 27.3 KG/M2 | DIASTOLIC BLOOD PRESSURE: 72 MMHG | SYSTOLIC BLOOD PRESSURE: 116 MMHG | OXYGEN SATURATION: 98 % | HEIGHT: 72 IN | RESPIRATION RATE: 16 BRPM

## 2022-10-28 DIAGNOSIS — L40.8 OTHER PSORIASIS: ICD-10-CM

## 2022-10-28 DIAGNOSIS — E78.5 HYPERLIPIDEMIA WITH TARGET LDL LESS THAN 100: ICD-10-CM

## 2022-10-28 DIAGNOSIS — N52.2 DRUG-INDUCED ERECTILE DYSFUNCTION: ICD-10-CM

## 2022-10-28 DIAGNOSIS — Z00.00 WELL ADULT EXAM: Primary | ICD-10-CM

## 2022-10-28 DIAGNOSIS — I25.118 CORONARY ARTERY DISEASE OF NATIVE ARTERY OF NATIVE HEART WITH STABLE ANGINA PECTORIS (H): ICD-10-CM

## 2022-10-28 PROCEDURE — 99213 OFFICE O/P EST LOW 20 MIN: CPT | Mod: 25 | Performed by: FAMILY MEDICINE

## 2022-10-28 PROCEDURE — 0134A COVID-19,PF,MODERNA BIVALENT: CPT | Performed by: FAMILY MEDICINE

## 2022-10-28 PROCEDURE — 90682 RIV4 VACC RECOMBINANT DNA IM: CPT | Performed by: FAMILY MEDICINE

## 2022-10-28 PROCEDURE — 90471 IMMUNIZATION ADMIN: CPT | Performed by: FAMILY MEDICINE

## 2022-10-28 PROCEDURE — 99396 PREV VISIT EST AGE 40-64: CPT | Mod: 25 | Performed by: FAMILY MEDICINE

## 2022-10-28 PROCEDURE — 91313 COVID-19,PF,MODERNA BIVALENT: CPT | Performed by: FAMILY MEDICINE

## 2022-10-28 RX ORDER — AMOXICILLIN 500 MG/1
CAPSULE ORAL
COMMUNITY
Start: 2022-10-12 | End: 2022-10-28

## 2022-10-28 ASSESSMENT — ENCOUNTER SYMPTOMS
SHORTNESS OF BREATH: 0
COUGH: 0
SORE THROAT: 0
HEMATURIA: 0
PARESTHESIAS: 0
MYALGIAS: 0
FEVER: 0
WEAKNESS: 0
HEMATOCHEZIA: 0
EYE PAIN: 0
HEADACHES: 0
DIARRHEA: 0
CHILLS: 0
NAUSEA: 0
ARTHRALGIAS: 0
FREQUENCY: 0
CONSTIPATION: 0
JOINT SWELLING: 0
DYSURIA: 0
NERVOUS/ANXIOUS: 0
ABDOMINAL PAIN: 0
HEARTBURN: 0
PALPITATIONS: 0
DIZZINESS: 0

## 2022-10-28 ASSESSMENT — PAIN SCALES - GENERAL: PAINLEVEL: NO PAIN (0)

## 2022-10-28 NOTE — PROGRESS NOTES
SUBJECTIVE:   CC: Leonides is an 62 year old who presents for preventative health visit.       Patient has been advised of split billing requirements and indicates understanding: Yes  Healthy Habits:     Getting at least 3 servings of Calcium per day:  Yes    Bi-annual eye exam:  Yes    Dental care twice a year:  Yes    Sleep apnea or symptoms of sleep apnea:  None    Diet:  Regular (no restrictions)    Frequency of exercise:  4-5 days/week    Duration of exercise:  30-45 minutes    PHQ-2 Total Score: 0    Additional concerns today:  No    Leonides is here for his annual wellness exam.  States he has been doing well since alf.  Continues to get outdoors and do a lot of golfing and is generally fairly active.  He is able to manage without having episodes of chest pain on his current regimen and he continues to follow-up with cardiology annually, such that he sees me or cardiology every 6 months for his follow-ups.  He states he has lost about 12 or 13 pounds and is pleased with that.  Our records show that he has dropped from around 215 to 16 pounds down to 201 today, putting his BMI at 27.34.  He has no other specific concerns or things to address.    Today's PHQ-2 Score:   PHQ-2 ( 1999 Pfizer) 10/24/2022   Q1: Little interest or pleasure in doing things 0   Q2: Feeling down, depressed or hopeless 0   PHQ-2 Score 0   Q1: Little interest or pleasure in doing things Not at all   Q2: Feeling down, depressed or hopeless Not at all   PHQ-2 Score 0       Abuse: Current or Past(Physical, Sexual or Emotional)- No  Do you feel safe in your environment? Yes    Have you ever done Advance Care Planning? (For example, a Health Directive, POLST, or a discussion with a medical provider or your loved ones about your wishes): No, advance care planning information given to patient to review.  Patient declined advance care planning discussion at this time.    Social History     Tobacco Use     Smoking status: Former     Packs/day:  2.00     Years: 12.00     Pack years: 24.00     Types: Cigarettes     Smokeless tobacco: Never     Tobacco comments:     Quit at age 25   Substance Use Topics     Alcohol use: Yes     Comment: beer weekly         Alcohol Use 10/24/2022   Prescreen: >3 drinks/day or >7 drinks/week? No   Prescreen: >3 drinks/day or >7 drinks/week? -     Last PSA: No results found for: PSA  Reviewed and updated as needed this visit by clinical staff   Tobacco  Allergies  Meds   Med Hx  Surg Hx  Fam Hx  Soc Hx        Reviewed and updated as needed this visit by Provider                     Review of Systems   Constitutional: Negative for chills and fever.   HENT: Negative for congestion, ear pain, hearing loss and sore throat.    Eyes: Negative for pain and visual disturbance.   Respiratory: Negative for cough and shortness of breath.    Cardiovascular: Negative for chest pain, palpitations and peripheral edema.   Gastrointestinal: Negative for abdominal pain, constipation, diarrhea, heartburn, hematochezia and nausea.   Genitourinary: Positive for impotence. Negative for dysuria, frequency, genital sores, hematuria, penile discharge and urgency.   Musculoskeletal: Negative for arthralgias, joint swelling and myalgias.   Skin: Negative for rash.   Neurological: Negative for dizziness, weakness, headaches and paresthesias.   Psychiatric/Behavioral: Negative for mood changes. The patient is not nervous/anxious.      He notes that Cialis has been effective for him for his ED.  Also states that Tremfya has been effective at managing his psoriasis.  He does have questions about how effective vaccines are in him with this medication and we did speak to the fact that immunizations are quite safe to give and perhaps more important that he get them as often is recommended to optimize his response to these vaccines given immunocompromising effect of this medication.      OBJECTIVE:   /72 (BP Location: Left arm, Patient Position:  Sitting, Cuff Size: Adult Regular)   Pulse 53   Temp 97.9  F (36.6  C) (Temporal)   Resp 16   Ht 1.829 m (6')   Wt 91.4 kg (201 lb 9.6 oz)   SpO2 98%   BMI 27.34 kg/m      Physical Exam  GENERAL: healthy, alert and no distress  EYES: Eyes grossly normal to inspection, PERRL and conjunctivae and sclerae normal, fundi are clear  HENT: ear canals and TM's normal, nose and mouth without ulcers or lesions  NECK: no adenopathy, no asymmetry, masses, or scars and thyroid normal to palpation  RESP: lungs clear to auscultation - no rales, rhonchi or wheezes  CV: regular rate and rhythm, normal S1 S2, no S3 or S4, no murmur, click or rub, no peripheral edema   ABDOMEN: soft, nontender, no hepatosplenomegaly, no masses and bowel sounds normal  MS: no gross musculoskeletal defects noted, no edema  SKIN: no suspicious lesions or rashes  NEURO: Normal strength and tone, mentation intact and speech normal  PSYCH: mentation appears normal, affect normal/bright.    Labs from 6 months ago reviewed and normal.     Component      Latest Ref Rng & Units 4/19/2022   Sodium      133 - 144 mmol/L 141   Potassium      3.4 - 5.3 mmol/L 4.4   Chloride      94 - 109 mmol/L 108   Carbon Dioxide      20 - 32 mmol/L 30   Anion Gap      3 - 14 mmol/L 3   Urea Nitrogen      7 - 30 mg/dL 14   Creatinine      0.66 - 1.25 mg/dL 0.97   Calcium      8.5 - 10.1 mg/dL 8.9   Glucose      70 - 99 mg/dL 101 (H)   GFR Estimate      >60 mL/min/1.73m2 88   Cholesterol      <200 mg/dL 109   Triglycerides      <150 mg/dL 90   HDL Cholesterol      >=40 mg/dL 36 (L)   LDL Cholesterol Calculated      <=100 mg/dL 55   Non HDL Cholesterol      <130 mg/dL 73   Patient Fasting > 8hrs?       Yes   ALT      0 - 70 U/L 65           ASSESSMENT/PLAN:        Well adult exam  Up to date on cancer screenings.  Due for flu and covid booster vaccines today and will receive those.  Annual labs will be done in April when back to see cardiology and will include lipids.  Will  also discuss consideration of checking PSA testing. Congratulated on his weight loss.     Coronary artery disease of native artery of native heart with stable angina pectoris (H)  Remains asymptomatic without angina with his level of exertion.  His blood pressures are low normal range and no hypotension symptoms. No reported palpitations or tachycardia.      Hyperlipidemia with target LDL less than 100  Excellent response to his statin therapy/ no change indicated.     Other psoriasis  Getting a good response to Tremphya. No side effects and has minimal patches of scaliness on his forehead from time to time. Will continue to follow with dermatology for this.     Drug-induced erectile dysfunction  Okay to continue with use of cialis.          COUNSELING:   Reviewed preventive health counseling, as reflected in patient instructions       Regular exercise    Estimated body mass index is 27.34 kg/m  as calculated from the following:    Height as of this encounter: 1.829 m (6').    Weight as of this encounter: 91.4 kg (201 lb 9.6 oz).     Weight management plan: Discussed healthy diet and exercise guidelines    He reports that he has quit smoking. His smoking use included cigarettes. He has a 24.00 pack-year smoking history. He has never used smokeless tobacco.      Counseling Resources:  ATP IV Guidelines  Pooled Cohorts Equation Calculator  FRAX Risk Assessment  ICSI Preventive Guidelines  Dietary Guidelines for Americans, 2010  USDA's MyPlate  ASA Prophylaxis  Lung CA Screening    Gregory G. Schoen, MD  [facility]

## 2022-12-13 ENCOUNTER — MYC MEDICAL ADVICE (OUTPATIENT)
Dept: CARDIOLOGY | Facility: CLINIC | Age: 62
End: 2022-12-13

## 2022-12-13 DIAGNOSIS — I25.10 CORONARY ARTERY DISEASE INVOLVING NATIVE CORONARY ARTERY OF NATIVE HEART WITHOUT ANGINA PECTORIS: ICD-10-CM

## 2022-12-13 DIAGNOSIS — E78.5 HYPERLIPIDEMIA LDL GOAL <70: ICD-10-CM

## 2022-12-13 DIAGNOSIS — I10 BENIGN ESSENTIAL HYPERTENSION: ICD-10-CM

## 2022-12-13 RX ORDER — AMLODIPINE BESYLATE 5 MG/1
5 TABLET ORAL DAILY
Qty: 90 TABLET | Refills: 3 | Status: SHIPPED | OUTPATIENT
Start: 2022-12-13 | End: 2023-04-04

## 2022-12-13 RX ORDER — METOPROLOL SUCCINATE 50 MG/1
50 TABLET, EXTENDED RELEASE ORAL DAILY
Qty: 90 TABLET | Refills: 3 | Status: SHIPPED | OUTPATIENT
Start: 2022-12-13 | End: 2023-04-04

## 2022-12-13 RX ORDER — CLOPIDOGREL BISULFATE 75 MG/1
75 TABLET ORAL DAILY
Qty: 90 TABLET | Refills: 3 | Status: SHIPPED | OUTPATIENT
Start: 2022-12-13 | End: 2023-04-04

## 2022-12-13 RX ORDER — ATORVASTATIN CALCIUM 80 MG/1
80 TABLET, FILM COATED ORAL DAILY
Qty: 90 TABLET | Refills: 3 | Status: SHIPPED | OUTPATIENT
Start: 2022-12-13 | End: 2023-04-04

## 2022-12-13 NOTE — TELEPHONE ENCOUNTER
Amlodipine   Last Written Prescription Date:  4/19/22  Last Fill Quantity: 90,  # refills: 3   Last office visit: 4/19/2022 with prescribing provider:     Future Office Visit:  Next follow up due around 4/19/23 not yet scheduled.     Atorvastatin  Last Written Prescription Date:  4/19/22  Last Fill Quantity: 90,  # refills: 3   Last office visit: 4/19/2022 with prescribing provider:     Future Office Visit:  Next follow up due around 4/19/23 not yet scheduled.       Plavix   Last Written Prescription Date:  4/19/22  Last Fill Quantity: 90,  # refills: 3   Last office visit: 4/19/2022 with prescribing provider:     Future Office Visit:  Next follow up due around 4/19/23 not yet scheduled.     Metoprolol   Last Written Prescription Date:  4/19/22  Last Fill Quantity: 90,  # refills: 3   Last office visit: 4/19/2022 with prescribing provider:     Future Office Visit:  Next follow up due around 4/19/23 not yet scheduled.     Tish Clifford on 12/13/2022 at 9:13 AM

## 2022-12-29 ENCOUNTER — MYC MEDICAL ADVICE (OUTPATIENT)
Dept: FAMILY MEDICINE | Facility: CLINIC | Age: 62
End: 2022-12-29

## 2022-12-29 DIAGNOSIS — N52.2 DRUG-INDUCED ERECTILE DYSFUNCTION: ICD-10-CM

## 2022-12-30 ENCOUNTER — TELEPHONE (OUTPATIENT)
Dept: CARDIOLOGY | Facility: CLINIC | Age: 62
End: 2022-12-30

## 2022-12-30 NOTE — TELEPHONE ENCOUNTER
[facility] Call Center    Phone Message    May a detailed message be left on voicemail: no     Reason for Call: Other: Please extend labs for upcoming appt in May.  Please call pt to coordinate once labs are extended.       Pt scheduled via [email or web address] without labs.    Action Taken: Other: cardio    Travel Screening: Not Applicable

## 2023-01-02 RX ORDER — TADALAFIL 20 MG/1
TABLET ORAL
Qty: 90 TABLET | Refills: 3 | Status: SHIPPED | OUTPATIENT
Start: 2023-01-02 | End: 2023-03-01

## 2023-01-02 NOTE — TELEPHONE ENCOUNTER
Lab orders extended. Routed to scheduling to make sure patient knows to have fasting labs done prior to visit.

## 2023-01-04 ENCOUNTER — MYC MEDICAL ADVICE (OUTPATIENT)
Dept: CARDIOLOGY | Facility: CLINIC | Age: 63
End: 2023-01-04

## 2023-01-04 DIAGNOSIS — E78.5 HYPERLIPIDEMIA LDL GOAL <70: ICD-10-CM

## 2023-01-04 RX ORDER — EZETIMIBE 10 MG/1
10 TABLET ORAL DAILY
Qty: 90 TABLET | Refills: 3 | Status: SHIPPED | OUTPATIENT
Start: 2023-01-04 | End: 2023-04-04

## 2023-01-04 NOTE — TELEPHONE ENCOUNTER
Last Written Prescription Date:  4/19/2022  Last Fill Quantity: 90,  # refills: 3   Last office visit: 4/19/2022 with prescribing provider:      Future Office Visit:  5/2023    Tish Clifford on 1/4/2023 at 9:33 AM

## 2023-02-27 DIAGNOSIS — N52.2 DRUG-INDUCED ERECTILE DYSFUNCTION: ICD-10-CM

## 2023-03-01 RX ORDER — TADALAFIL 20 MG/1
TABLET ORAL
Qty: 90 TABLET | Refills: 2 | Status: SHIPPED | OUTPATIENT
Start: 2023-03-01 | End: 2023-08-14

## 2023-03-06 DIAGNOSIS — L40.0 PSORIASIS VULGARIS: Primary | ICD-10-CM

## 2023-03-06 DIAGNOSIS — Z79.899 ENCOUNTER FOR LONG-TERM (CURRENT) USE OF HIGH-RISK MEDICATION: ICD-10-CM

## 2023-04-03 ENCOUNTER — MYC MEDICAL ADVICE (OUTPATIENT)
Dept: CARDIOLOGY | Facility: CLINIC | Age: 63
End: 2023-04-03
Payer: COMMERCIAL

## 2023-04-03 DIAGNOSIS — E78.5 HYPERLIPIDEMIA LDL GOAL <70: ICD-10-CM

## 2023-04-03 DIAGNOSIS — I25.10 CORONARY ARTERY DISEASE INVOLVING NATIVE CORONARY ARTERY OF NATIVE HEART WITHOUT ANGINA PECTORIS: ICD-10-CM

## 2023-04-03 DIAGNOSIS — I10 BENIGN ESSENTIAL HYPERTENSION: ICD-10-CM

## 2023-04-04 RX ORDER — EZETIMIBE 10 MG/1
10 TABLET ORAL DAILY
Qty: 90 TABLET | Refills: 3 | Status: SHIPPED | OUTPATIENT
Start: 2023-04-04 | End: 2024-01-11

## 2023-04-04 RX ORDER — AMLODIPINE BESYLATE 5 MG/1
5 TABLET ORAL DAILY
Qty: 90 TABLET | Refills: 3 | Status: SHIPPED | OUTPATIENT
Start: 2023-04-04 | End: 2023-11-28

## 2023-04-04 RX ORDER — CLOPIDOGREL BISULFATE 75 MG/1
75 TABLET ORAL DAILY
Qty: 90 TABLET | Refills: 3 | Status: SHIPPED | OUTPATIENT
Start: 2023-04-04 | End: 2023-11-28

## 2023-04-04 RX ORDER — METOPROLOL SUCCINATE 50 MG/1
50 TABLET, EXTENDED RELEASE ORAL DAILY
Qty: 90 TABLET | Refills: 3 | Status: SHIPPED | OUTPATIENT
Start: 2023-04-04 | End: 2024-01-15

## 2023-04-04 RX ORDER — ATORVASTATIN CALCIUM 80 MG/1
80 TABLET, FILM COATED ORAL DAILY
Qty: 90 TABLET | Refills: 3 | Status: SHIPPED | OUTPATIENT
Start: 2023-04-04 | End: 2024-01-04

## 2023-04-04 NOTE — TELEPHONE ENCOUNTER
Per pt. daryn would like all Cardiac medications sent to a new pharmacy, [facility].     Medications are pended. Pt. Last saw  4/19/22 and RA stated to be seen back in one year Pt. Is scheduled to see  5/2/23.     Tish Clifford on 4/4/2023 at 7:54 AM

## 2023-05-02 ENCOUNTER — OFFICE VISIT (OUTPATIENT)
Dept: CARDIOLOGY | Facility: CLINIC | Age: 63
End: 2023-05-02
Payer: COMMERCIAL

## 2023-05-02 VITALS
DIASTOLIC BLOOD PRESSURE: 64 MMHG | OXYGEN SATURATION: 98 % | HEART RATE: 52 BPM | HEIGHT: 72 IN | BODY MASS INDEX: 26.76 KG/M2 | WEIGHT: 197.6 LBS | SYSTOLIC BLOOD PRESSURE: 104 MMHG

## 2023-05-02 DIAGNOSIS — I20.89 STABLE ANGINA PECTORIS (H): ICD-10-CM

## 2023-05-02 DIAGNOSIS — I25.118 CORONARY ARTERY DISEASE OF NATIVE ARTERY OF NATIVE HEART WITH STABLE ANGINA PECTORIS (H): ICD-10-CM

## 2023-05-02 DIAGNOSIS — E78.5 HYPERLIPIDEMIA LDL GOAL <70: ICD-10-CM

## 2023-05-02 DIAGNOSIS — I10 BENIGN ESSENTIAL HYPERTENSION: Primary | ICD-10-CM

## 2023-05-02 DIAGNOSIS — L40.0 PSORIASIS VULGARIS: ICD-10-CM

## 2023-05-02 LAB
ALBUMIN SERPL BCG-MCNC: 4.4 G/DL (ref 3.5–5.2)
ALP SERPL-CCNC: 66 U/L (ref 40–129)
ALT SERPL W P-5'-P-CCNC: 64 U/L (ref 10–50)
ANION GAP SERPL CALCULATED.3IONS-SCNC: 8 MMOL/L (ref 7–15)
AST SERPL W P-5'-P-CCNC: 33 U/L (ref 10–50)
BASOPHILS # BLD AUTO: 0 10E3/UL (ref 0–0.2)
BASOPHILS NFR BLD AUTO: 0 %
BILIRUB SERPL-MCNC: 0.7 MG/DL
BUN SERPL-MCNC: 13.2 MG/DL (ref 8–23)
CALCIUM SERPL-MCNC: 9.1 MG/DL (ref 8.8–10.2)
CHLORIDE SERPL-SCNC: 102 MMOL/L (ref 98–107)
CHOLEST SERPL-MCNC: 113 MG/DL
CREAT SERPL-MCNC: 0.89 MG/DL (ref 0.67–1.17)
DEPRECATED HCO3 PLAS-SCNC: 29 MMOL/L (ref 22–29)
EOSINOPHIL # BLD AUTO: 0.1 10E3/UL (ref 0–0.7)
EOSINOPHIL NFR BLD AUTO: 2 %
ERYTHROCYTE [DISTWIDTH] IN BLOOD BY AUTOMATED COUNT: 12.5 % (ref 10–15)
GFR SERPL CREATININE-BSD FRML MDRD: >90 ML/MIN/1.73M2
GLUCOSE SERPL-MCNC: 95 MG/DL (ref 70–99)
HAV IGM SERPL QL IA: NONREACTIVE
HBV CORE AB SERPL QL IA: NONREACTIVE
HBV SURFACE AG SERPL QL IA: NONREACTIVE
HCT VFR BLD AUTO: 45.3 % (ref 40–53)
HDLC SERPL-MCNC: 37 MG/DL
HGB BLD-MCNC: 15.7 G/DL (ref 13.3–17.7)
IMM GRANULOCYTES # BLD: 0 10E3/UL
IMM GRANULOCYTES NFR BLD: 0 %
LDLC SERPL CALC-MCNC: 66 MG/DL
LYMPHOCYTES # BLD AUTO: 2.1 10E3/UL (ref 0.8–5.3)
LYMPHOCYTES NFR BLD AUTO: 37 %
MCH RBC QN AUTO: 31.6 PG (ref 26.5–33)
MCHC RBC AUTO-ENTMCNC: 34.7 G/DL (ref 31.5–36.5)
MCV RBC AUTO: 91 FL (ref 78–100)
MONOCYTES # BLD AUTO: 0.4 10E3/UL (ref 0–1.3)
MONOCYTES NFR BLD AUTO: 7 %
NEUTROPHILS # BLD AUTO: 3.1 10E3/UL (ref 1.6–8.3)
NEUTROPHILS NFR BLD AUTO: 54 %
NONHDLC SERPL-MCNC: 76 MG/DL
NRBC # BLD AUTO: 0 10E3/UL
NRBC BLD AUTO-RTO: 0 /100
PLATELET # BLD AUTO: 191 10E3/UL (ref 150–450)
POTASSIUM SERPL-SCNC: 4.5 MMOL/L (ref 3.4–5.3)
PROT SERPL-MCNC: 7.5 G/DL (ref 6.4–8.3)
RBC # BLD AUTO: 4.97 10E6/UL (ref 4.4–5.9)
SODIUM SERPL-SCNC: 139 MMOL/L (ref 136–145)
TRIGL SERPL-MCNC: 52 MG/DL
WBC # BLD AUTO: 5.7 10E3/UL (ref 4–11)

## 2023-05-02 PROCEDURE — 36415 COLL VENOUS BLD VENIPUNCTURE: CPT | Performed by: INTERNAL MEDICINE

## 2023-05-02 PROCEDURE — 86709 HEPATITIS A IGM ANTIBODY: CPT | Performed by: INTERNAL MEDICINE

## 2023-05-02 PROCEDURE — 86704 HEP B CORE ANTIBODY TOTAL: CPT | Performed by: INTERNAL MEDICINE

## 2023-05-02 PROCEDURE — 85025 COMPLETE CBC W/AUTO DIFF WBC: CPT | Performed by: INTERNAL MEDICINE

## 2023-05-02 PROCEDURE — 80061 LIPID PANEL: CPT | Performed by: INTERNAL MEDICINE

## 2023-05-02 PROCEDURE — 87340 HEPATITIS B SURFACE AG IA: CPT | Performed by: INTERNAL MEDICINE

## 2023-05-02 PROCEDURE — 87522 HEPATITIS C REVRS TRNSCRPJ: CPT | Performed by: INTERNAL MEDICINE

## 2023-05-02 PROCEDURE — 80053 COMPREHEN METABOLIC PANEL: CPT | Performed by: INTERNAL MEDICINE

## 2023-05-02 PROCEDURE — 86481 TB AG RESPONSE T-CELL SUSP: CPT | Performed by: INTERNAL MEDICINE

## 2023-05-02 PROCEDURE — 99214 OFFICE O/P EST MOD 30 MIN: CPT | Mod: 25 | Performed by: INTERNAL MEDICINE

## 2023-05-02 NOTE — PROGRESS NOTES
HPI and Plan:   See dictation    Today's clinic visit entailed:  Review of the result(s) of each unique test - bmp, flp, alt  Ordering of each unique test  Prescription drug management  30 minutes spent by me on the date of the encounter doing chart review, review of test results, interpretation of tests and patient visit   Provider  Link to [facility] Help Grid     The level of medical decision making during this visit was of moderate complexity.      Orders Placed This Encounter   Procedures     CBC with platelets and differential     Basic metabolic panel     Lipid Profile     ALT     Follow-Up with Cardiology       No orders of the defined types were placed in this encounter.      There are no discontinued medications.      Encounter Diagnoses   Name Primary?     Coronary artery disease of native artery of native heart with stable angina pectoris (H)      Hyperlipidemia LDL goal <70      Psoriasis vulgaris      Benign essential hypertension Yes     Stable angina pectoris (H)        CURRENT MEDICATIONS:  Current Outpatient Medications   Medication Sig Dispense Refill     amLODIPine (NORVASC) 5 MG tablet Take 1 tablet (5 mg) by mouth daily 90 tablet 3     atorvastatin (LIPITOR) 80 MG tablet Take 1 tablet (80 mg) by mouth daily 90 tablet 3     clopidogrel (PLAVIX) 75 MG tablet Take 1 tablet (75 mg) by mouth daily 90 tablet 3     ezetimibe (ZETIA) 10 MG tablet Take 1 tablet (10 mg) by mouth daily 90 tablet 3     guselkumab (TREMFYA) 100 MG/ML SOSY Inject 100 mg Subcutaneous once       metoprolol succinate ER (TOPROL XL) 50 MG 24 hr tablet Take 1 tablet (50 mg) by mouth daily 90 tablet 3     tadalafil (CIALIS) 20 MG tablet TAKE 1 TABLET DAILY AS     NEEDED Strength: 20 mg 90 tablet 2     ACE/ARB NOT PRESCRIBED, INTENTIONAL, by Other route continuous prn. (Patient not taking: Reported on 11/12/2021)       clobetasol propionate (OLUX) 0.05 % external foam  (Patient not taking: Reported on 10/28/2022)         ALLERGIES      Allergies   Allergen Reactions     No Known Drug Allergy        PAST MEDICAL HISTORY:  Past Medical History:   Diagnosis Date     CAD (coronary artery disease) 2010     History of angina pectoris 3/1/2018     Hyperlipidemia LDL goal < 100 2010     Hypertension 2010     Other psoriasis 12/15/2005       PAST SURGICAL HISTORY:  Past Surgical History:   Procedure Laterality Date     COLONOSCOPY  02/10/10     COLONOSCOPY N/A 12/15/2021    Procedure: COLONOSCOPY;  Surgeon: Rehan Chang MD;  Location:  GI     HC LAPAROSCOPY, SURGICAL; APPENDECTOMY  7/17/10     REMOVAL OF SPERM DUCT(S)         FAMILY HISTORY:  Family History   Problem Relation Age of Onset     Allergies Daughter         environment al     Allergies Son         same as sister     Alzheimer Disease Father          at age 73     Blood Disease Father      Cancer Maternal Grandfather         [place] lung -     Heart Disease Maternal Grandmother      Psychotic Disorder Brother        SOCIAL HISTORY:  Social History     Socioeconomic History     Marital status:      Spouse name: None     Number of children: None     Years of education: None     Highest education level: None   Tobacco Use     Smoking status: Former     Packs/day: 2.00     Years: 12.00     Pack years: 24.00     Types: Cigarettes     Smokeless tobacco: Never     Tobacco comments:     Quit at age 25   Vaping Use     Vaping status: Never Used   Substance and Sexual Activity     Alcohol use: Yes     Comment: beer weekly     Drug use: No     Sexual activity: Yes     Partners: Female       Review of Systems:  Skin:          Eyes:         ENT:         Respiratory:  Negative shortness of breath;cough;wheezing     Cardiovascular:  Negative;palpitations;chest pain;edema;lightheadedness;dizziness;syncope or near-syncope      Gastroenterology:        Genitourinary:         Musculoskeletal:         Neurologic:  Negative numbness or tingling of hands;numbness or tingling of  feet    Psychiatric:         Heme/Lymph/Imm:         Endocrine:           Physical Exam:  Vitals: /64 (BP Location: Right arm, Patient Position: Sitting, Cuff Size: Adult Regular)   Pulse 52   Ht 1.829 m (6')   Wt 89.6 kg (197 lb 9.6 oz)   SpO2 98%   BMI 26.80 kg/m      Constitutional:           Skin:             Head:           Eyes:           Lymph:      ENT:           Neck:           Respiratory:            Cardiac:                                                           GI:           Extremities and Muscular Skeletal:                 Neurological:           Psych:           CC  No referring provider defined for this encounter.

## 2023-05-02 NOTE — PROGRESS NOTES
Service Date: 2023    HISTORY OF PRESENT ILLNESS:  I had the opportunity to see Mr. Leonides Fernandez in Cardiology Clinic today at [facility] in [place] for reevaluation of coronary artery disease.  His history includes a chronically occluded mid LAD supplied by collaterals, which we have been managing medically for quite a few years now.  He plays golf and continues to be quite active.  He occasionally has mild angina if he exerts himself too strenuously such as when he does some running.  If he slows down, it immediately resolves.  He does not feel that it limits him in any way.    His cardiac risk factors include hypertension and dyslipidemia.  His cholesterol numbers this year are excellent with an LDL of 66 and HDL of 37.  His basic metabolic panel is normal.    PHYSICAL EXAMINATION:  His blood pressure is 104/64, heart rate 52 and weight 197 pounds.  His lungs are clear.  Heart rhythm is regular.  He has no cardiac murmurs or carotid bruits.    IMPRESSIONS:  Mr. Leonides Fernandez is a 62-year-old gentleman with coronary artery disease including a chronically occluded mid LAD with collaterals.  He has normal left ventricular function and occasional mild exertional angina at high levels of exercise.  His blood pressure and cholesterol numbers are excellent.  He is on appropriate medications.  I will not make any changes.  I will plan to see him back again in 1 year for reevaluation.    Tay Hernandez MD, F.A.C.C.    cc:    Gregory Schoen, [place] [address]     Tay Hernandez MD, [facility]        D: 2023   T: 2023   MT: MERLYN    Name:     LEONIDES FERNANDEZ  MRN:      [id number]        Account:      [id number]   :      1960           Service Date: 2023       Document: [id number]

## 2023-05-02 NOTE — LETTER
5/2/2023    Gregory G. Schoen, MD  [address] [place] [address]    RE: Leonides Paul       Dear Colleague,     I had the pleasure of seeing Leonides Paul in the [facility].  HPI and Plan:   See dictation    Today's clinic visit entailed:  Review of the result(s) of each unique test - bmp, flp, alt  Ordering of each unique test  Prescription drug management  30 minutes spent by me on the date of the encounter doing chart review, review of test results, interpretation of tests and patient visit   Provider  Link to MDM Help Grid     The level of medical decision making during this visit was of moderate complexity.      Orders Placed This Encounter   Procedures    CBC with platelets and differential    Basic metabolic panel    Lipid Profile    ALT    Follow-Up with Cardiology       No orders of the defined types were placed in this encounter.      There are no discontinued medications.      Encounter Diagnoses   Name Primary?    Coronary artery disease of native artery of native heart with stable angina pectoris (H)     Hyperlipidemia LDL goal <70     Psoriasis vulgaris     Benign essential hypertension Yes    Stable angina pectoris (H)        CURRENT MEDICATIONS:  Current Outpatient Medications   Medication Sig Dispense Refill    amLODIPine (NORVASC) 5 MG tablet Take 1 tablet (5 mg) by mouth daily 90 tablet 3    atorvastatin (LIPITOR) 80 MG tablet Take 1 tablet (80 mg) by mouth daily 90 tablet 3    clopidogrel (PLAVIX) 75 MG tablet Take 1 tablet (75 mg) by mouth daily 90 tablet 3    ezetimibe (ZETIA) 10 MG tablet Take 1 tablet (10 mg) by mouth daily 90 tablet 3    guselkumab (TREMFYA) 100 MG/ML SOSY Inject 100 mg Subcutaneous once      metoprolol succinate ER (TOPROL XL) 50 MG 24 hr tablet Take 1 tablet (50 mg) by mouth daily 90 tablet 3    tadalafil (CIALIS) 20 MG tablet TAKE 1 TABLET DAILY AS     NEEDED Strength: 20 mg 90 tablet 2    ACE/ARB NOT PRESCRIBED, INTENTIONAL, by Other  route continuous prn. (Patient not taking: Reported on 2021)      clobetasol propionate (OLUX) 0.05 % external foam  (Patient not taking: Reported on 10/28/2022)         ALLERGIES     Allergies   Allergen Reactions    No Known Drug Allergy        PAST MEDICAL HISTORY:  Past Medical History:   Diagnosis Date    CAD (coronary artery disease) 2010    History of angina pectoris 3/1/2018    Hyperlipidemia LDL goal < 100 2010    Hypertension 2010    Other psoriasis 12/15/2005       PAST SURGICAL HISTORY:  Past Surgical History:   Procedure Laterality Date    COLONOSCOPY  02/10/10    COLONOSCOPY N/A 12/15/2021    Procedure: COLONOSCOPY;  Surgeon: Rehan Chang MD;  Location: [facility]    [facility] LAPAROSCOPY, SURGICAL; APPENDECTOMY  7/17/10    REMOVAL OF SPERM DUCT(S)         FAMILY HISTORY:  Family History   Problem Relation Age of Onset    Allergies Daughter         environment al    Allergies Son         same as sister    Alzheimer Disease Father          at age 73    Blood Disease Father     Cancer Maternal Grandfather         Hooks lung -    Heart Disease Maternal Grandmother     Psychotic Disorder Brother        SOCIAL HISTORY:  Social History     Socioeconomic History    Marital status:      Spouse name: None    Number of children: None    Years of education: None    Highest education level: None   Tobacco Use    Smoking status: Former     Packs/day: 2.00     Years: 12.00     Pack years: 24.00     Types: Cigarettes    Smokeless tobacco: Never    Tobacco comments:     Quit at age 25   Vaping Use    Vaping status: Never Used   Substance and Sexual Activity    Alcohol use: Yes     Comment: beer weekly    Drug use: No    Sexual activity: Yes     Partners: Female       Review of Systems:  Skin:          Eyes:         ENT:         Respiratory:  Negative shortness of breath;cough;wheezing     Cardiovascular:  Negative;palpitations;chest pain;edema;lightheadedness;dizziness;syncope or  near-syncope      Gastroenterology:        Genitourinary:         Musculoskeletal:         Neurologic:  Negative numbness or tingling of hands;numbness or tingling of feet    Psychiatric:         Heme/Lymph/Imm:         Endocrine:           Physical Exam:  Vitals: /64 (BP Location: Right arm, Patient Position: Sitting, Cuff Size: Adult Regular)   Pulse 52   Ht 1.829 m (6')   Wt 89.6 kg (197 lb 9.6 oz)   SpO2 98%   BMI 26.80 kg/m      Constitutional:           Skin:             Head:           Eyes:           Lymph:      ENT:           Neck:           Respiratory:            Cardiac:                                                           GI:           Extremities and Muscular Skeletal:                 Neurological:           Psych:           CC  No referring provider defined for this encounter.                Service Date: 05/02/2023    HISTORY OF PRESENT ILLNESS:  I had the opportunity to see Mr. Leonides Paul in Cardiology Clinic today at [facility] in [place] for reevaluation of coronary artery disease.  His history includes a chronically occluded mid LAD supplied by collaterals, which we have been managing medically for quite a few years now.  He plays golf and continues to be quite active.  He occasionally has mild angina if he exerts himself too strenuously such as when he does some running.  If he slows down, it immediately resolves.  He does not feel that it limits him in any way.    His cardiac risk factors include hypertension and dyslipidemia.  His cholesterol numbers this year are excellent with an LDL of 66 and HDL of 37.  His basic metabolic panel is normal.    PHYSICAL EXAMINATION:  His blood pressure is 104/64, heart rate 52 and weight 197 pounds.  His lungs are clear.  Heart rhythm is regular.  He has no cardiac murmurs or carotid bruits.    IMPRESSIONS:  Mr. Leonides Paul is a 62-year-old gentleman with coronary artery disease including a chronically occluded mid  LAD with collaterals.  He has normal left ventricular function and occasional mild exertional angina at high levels of exercise.  His blood pressure and cholesterol numbers are excellent.  He is on appropriate medications.  I will not make any changes.  I will plan to see him back again in 1 year for reevaluation.    Tay Hernandez MD, F.A.C.C.    cc:    Gregory Sch[place], [place] [address]     Tay Hernandez MD, [facility]        D: 2023   T: 2023   MT:     Name:     JIM FERNANDEZ  MRN:      [id number]        Account:      [id number]   :      1960           Service Date: 2023       Document: [id number]     Thank you for allowing me to participate in the care of your patient.      Sincerely,     TAY HERNANDEZ MD     [facility]  cc:   No referring provider defined for this encounter.

## 2023-05-04 LAB
GAMMA INTERFERON BACKGROUND BLD IA-ACNC: 0.57 IU/ML
HCV RNA SERPL NAA+PROBE-ACNC: NOT DETECTED IU/ML
M TB IFN-G BLD-IMP: NEGATIVE
M TB IFN-G CD4+ BCKGRND COR BLD-ACNC: 9.43 IU/ML
MITOGEN IGNF BCKGRD COR BLD-ACNC: -0.49 IU/ML
MITOGEN IGNF BCKGRD COR BLD-ACNC: -0.49 IU/ML
QUANTIFERON MITOGEN: 10 IU/ML
QUANTIFERON NIL TUBE: 0.57 IU/ML
QUANTIFERON TB1 TUBE: 0.08 IU/ML
QUANTIFERON TB2 TUBE: 0.08

## 2023-08-14 ENCOUNTER — MYC MEDICAL ADVICE (OUTPATIENT)
Dept: FAMILY MEDICINE | Facility: CLINIC | Age: 63
End: 2023-08-14
Payer: COMMERCIAL

## 2023-08-14 DIAGNOSIS — N52.2 DRUG-INDUCED ERECTILE DYSFUNCTION: ICD-10-CM

## 2023-08-14 RX ORDER — TADALAFIL 20 MG/1
TABLET ORAL
Qty: 90 TABLET | Refills: 2 | Status: SHIPPED | OUTPATIENT
Start: 2023-08-14 | End: 2024-04-25

## 2023-09-26 ASSESSMENT — ENCOUNTER SYMPTOMS
NAUSEA: 0
DIARRHEA: 0
SHORTNESS OF BREATH: 0
FEVER: 0
FREQUENCY: 0
HEARTBURN: 0
CHILLS: 0
DIZZINESS: 0
ARTHRALGIAS: 0
HEMATURIA: 0
COUGH: 0
HEADACHES: 0
DYSURIA: 0
CONSTIPATION: 0
JOINT SWELLING: 0
HEMATOCHEZIA: 0
WEAKNESS: 0
NERVOUS/ANXIOUS: 0
PARESTHESIAS: 0
SORE THROAT: 0
EYE PAIN: 0
ABDOMINAL PAIN: 0
MYALGIAS: 0
PALPITATIONS: 0

## 2023-09-26 ASSESSMENT — ACTIVITIES OF DAILY LIVING (ADL): CURRENT_FUNCTION: NO ASSISTANCE NEEDED

## 2023-10-02 ENCOUNTER — OFFICE VISIT (OUTPATIENT)
Dept: FAMILY MEDICINE | Facility: CLINIC | Age: 63
End: 2023-10-02
Payer: COMMERCIAL

## 2023-10-02 VITALS
WEIGHT: 195 LBS | BODY MASS INDEX: 26.41 KG/M2 | SYSTOLIC BLOOD PRESSURE: 122 MMHG | HEIGHT: 72 IN | RESPIRATION RATE: 16 BRPM | DIASTOLIC BLOOD PRESSURE: 76 MMHG | OXYGEN SATURATION: 97 % | TEMPERATURE: 98.1 F

## 2023-10-02 DIAGNOSIS — I10 BENIGN ESSENTIAL HYPERTENSION: ICD-10-CM

## 2023-10-02 DIAGNOSIS — L40.8 OTHER PSORIASIS: ICD-10-CM

## 2023-10-02 DIAGNOSIS — E78.5 HYPERLIPIDEMIA WITH TARGET LDL LESS THAN 100: ICD-10-CM

## 2023-10-02 DIAGNOSIS — Z00.00 WELLNESS EXAMINATION: Primary | ICD-10-CM

## 2023-10-02 DIAGNOSIS — I25.118 CORONARY ARTERY DISEASE OF NATIVE ARTERY OF NATIVE HEART WITH STABLE ANGINA PECTORIS (H): ICD-10-CM

## 2023-10-02 LAB
CREAT UR-MCNC: 224.9 MG/DL
MICROALBUMIN UR-MCNC: 23.7 MG/L
MICROALBUMIN/CREAT UR: 10.54 MG/G CR (ref 0–17)

## 2023-10-02 PROCEDURE — 99397 PER PM REEVAL EST PAT 65+ YR: CPT | Mod: 25 | Performed by: FAMILY MEDICINE

## 2023-10-02 PROCEDURE — 90662 IIV NO PRSV INCREASED AG IM: CPT | Performed by: FAMILY MEDICINE

## 2023-10-02 PROCEDURE — 82570 ASSAY OF URINE CREATININE: CPT | Performed by: FAMILY MEDICINE

## 2023-10-02 PROCEDURE — 90471 IMMUNIZATION ADMIN: CPT | Performed by: FAMILY MEDICINE

## 2023-10-02 PROCEDURE — 82043 UR ALBUMIN QUANTITATIVE: CPT | Performed by: FAMILY MEDICINE

## 2023-10-02 ASSESSMENT — ENCOUNTER SYMPTOMS
DYSURIA: 0
ARTHRALGIAS: 0
MYALGIAS: 0
WEAKNESS: 0
DIZZINESS: 0
PARESTHESIAS: 0
SORE THROAT: 0
FEVER: 0
JOINT SWELLING: 0
DIARRHEA: 0
HEMATOCHEZIA: 0
PALPITATIONS: 0
EYE PAIN: 0
HEARTBURN: 0
NERVOUS/ANXIOUS: 0
HEMATURIA: 0
COUGH: 0
HEADACHES: 0
ABDOMINAL PAIN: 0
FREQUENCY: 0
NAUSEA: 0
SHORTNESS OF BREATH: 0
CHILLS: 0
CONSTIPATION: 0

## 2023-10-02 ASSESSMENT — ACTIVITIES OF DAILY LIVING (ADL): CURRENT_FUNCTION: NO ASSISTANCE NEEDED

## 2023-10-02 NOTE — PROGRESS NOTES
Prior to immunization administration, verified patients identity using patient s name and date of birth. Please see Immunization Activity for additional information.     Screening Questionnaire for Adult Immunization    Are you sick today?   No   Do you have allergies to medications, food, a vaccine component or latex?   No   Have you ever had a serious reaction after receiving a vaccination?   No   Do you have a long-term health problem with heart, lung, kidney, or metabolic disease (e.g., diabetes), asthma, a blood disorder, no spleen, complement component deficiency, a cochlear implant, or a spinal fluid leak?  Are you on long-term aspirin therapy?   Yes   Do you have cancer, leukemia, HIV/AIDS, or any other immune system problem?   No   Do you have a parent, brother, or sister with an immune system problem?   No   In the past 3 months, have you taken medications that affect  your immune system, such as prednisone, other steroids, or anticancer drugs; drugs for the treatment of rheumatoid arthritis, Crohn s disease, or psoriasis; or have you had radiation treatments?   No   Have you had a seizure, or a brain or other nervous system problem?   No   During the past year, have you received a transfusion of blood or blood    products, or been given immune (gamma) globulin or antiviral drug?   No   For women: Are you pregnant or is there a chance you could become       pregnant during the next month?   No   Have you received any vaccinations in the past 4 weeks?   No     Immunization questionnaire was positive for at least one answer.  Notified .      Patient instructed to remain in clinic for 15 minutes afterwards, and to report any adverse reactions.     Screening performed by Daria Cobos CMA on 10/2/2023 at 4:09 PM.

## 2023-10-02 NOTE — PROGRESS NOTES
"SUBJECTIVE:   Leonides is a 63 year old who presents for Preventive Visit.      10/2/2023     3:02 PM   Additional Questions   Roomed by Daria Cobos       Are you in the first 12 months of your Medicare coverage?  Last eye exam within two years    Healthy Habits:     In general, how would you rate your overall health?  Good    Frequency of exercise:  4-5 days/week    Duration of exercise:  30-45 minutes    Do you usually eat at least 4 servings of fruit and vegetables a day, include whole grains    & fiber and avoid regularly eating high fat or \"junk\" foods?  Yes    Taking medications regularly:  Yes    Ability to successfully perform activities of daily living:  No assistance needed    Home Safety:  No safety concerns identified    Hearing Impairment:  No hearing concerns    In the past 6 months, have you been bothered by leaking of urine?  No    In general, how would you rate your overall mental or emotional health?  Good    Additional concerns today:  No    Here for wellness exam.  No medical concerns at this time.  Is able to be active without chest pains,shortness of breath or other issues.  Cardiology has deemed him to be stable.  He is on Tremfya for his psoriasis and as such, wonders about how best to proceed with the multiple vaccines he is due for based on some suppression of his immune response.      Today's PHQ-2 Score:       10/2/2023     1:33 PM   PHQ-2 ( 1999 Pf[employer])   Q1: Little interest or pleasure in doing things 0   Q2: Feeling down, depressed or hopeless 0   PHQ-2 Score 0   Q1: Little interest or pleasure in doing things Not at all   Q2: Feeling down, depressed or hopeless Not at all   PHQ-2 Score 0           Have you ever done Advance Care Planning? (For example, a Health Directive, POLST, or a discussion with a medical provider or your loved ones about your wishes): Yes, patient states has an Advance Care Planning document and will bring a copy to the clinic.       Fall risk  Fallen 2 or " more times in the past year?: No  Any fall with injury in the past year?: No    Cognitive Screening   1) Repeat 3 items (Leader, Season, Table)    2) Clock draw: NORMAL  3) 3 item recall: Recalls NO objects   Results: 0 items recalled: PROBABLE COGNITIVE IMPAIRMENT, **INFORM PROVIDER**     I gave Leonides three new objects to remember as he stated he really wasn't paying attention and he had no issues with recall at 5 and 15 minutes.        Mini-CogTM Copyright ANAND Prescott. Licensed by the author for use in [facility]; reprinted with permission (soob@[facility]). All rights reserved.      Do you have sleep apnea, excessive snoring or daytime drowsiness? : no    Reviewed and updated as needed this visit by clinical staff   Tobacco  Allergies  Meds              Reviewed and updated as needed this visit by Provider                 Social History     Tobacco Use    Smoking status: Former     Packs/day: 2.00     Years: 12.00     Pack years: 24.00     Types: Cigarettes    Smokeless tobacco: Never    Tobacco comments:     Quit at age 25   Substance Use Topics    Alcohol use: Yes     Comment: beer weekly             9/26/2023     8:10 AM   Alcohol Use   Prescreen: >3 drinks/day or >7 drinks/week? No     Do you have a current opioid prescription? No  Do you use any other controlled substances or medications that are not prescribed by a provider? None        Current providers sharing in care for this patient include:   Patient Care Team:  Schoen, Gregory G, MD as PCP - General  Schoen, Gregory G, MD as Assigned PCP  Tay Hernandez MD as Assigned Heart and Vascular Provider    The following health maintenance items are reviewed in Epic and correct as of today:  Health Maintenance   Topic Date Due    Pneumococcal Vaccine: 65+ Years (1 - PCV) Never done    MICROALBUMIN  11/04/2016    AORTIC ANEURYSM SCREENING (SYSTEM ASSIGNED)  Never done    INFLUENZA VACCINE (1) 09/01/2023    COVID-19 Vaccine (5 - 2023-24 season)  "09/01/2023    MEDICARE ANNUAL WELLNESS VISIT  10/28/2023    LUNG CANCER SCREENING  10/28/2023    ANNUAL REVIEW OF HM ORDERS  11/02/2023    LIPID  05/02/2024    CREATININE  05/02/2024    FALL RISK ASSESSMENT  10/02/2024    ADVANCE CARE PLANNING  11/02/2027    DTAP/TDAP/TD IMMUNIZATION (3 - Td or Tdap) 11/12/2031    COLORECTAL CANCER SCREENING  12/15/2031    HEPATITIS C SCREENING  Completed    HIV SCREENING  Completed    PHQ-2 (once per calendar year)  Completed    ZOSTER IMMUNIZATION  Completed    IPV IMMUNIZATION  Aged Out    HPV IMMUNIZATION  Aged Out    MENINGITIS IMMUNIZATION  Aged Out             Review of Systems   Constitutional:  Negative for chills and fever.   HENT:  Negative for congestion, ear pain, hearing loss and sore throat.    Eyes:  Negative for pain and visual disturbance.   Respiratory:  Negative for cough and shortness of breath.    Cardiovascular:  Negative for chest pain, palpitations and peripheral edema.   Gastrointestinal:  Negative for abdominal pain, constipation, diarrhea, heartburn, hematochezia and nausea.   Genitourinary:  Negative for dysuria, frequency, genital sores, hematuria, impotence, penile discharge and urgency.   Musculoskeletal:  Negative for arthralgias, joint swelling and myalgias.   Skin:  Negative for rash.   Neurological:  Negative for dizziness, weakness, headaches and paresthesias.   Psychiatric/Behavioral:  Negative for mood changes. The patient is not nervous/anxious.          OBJECTIVE:   /76   Temp 98.1  F (36.7  C) (Temporal)   Resp 16   Ht 1.816 m (5' 11.5\")   Wt 88.5 kg (195 lb)   SpO2 97%   BMI 26.82 kg/m   Estimated body mass index is 26.82 kg/m  as calculated from the following:    Height as of this encounter: 1.816 m (5' 11.5\").    Weight as of this encounter: 88.5 kg (195 lb).  Physical Exam  GENERAL: healthy, alert and no distress  EYES: Eyes grossly normal to inspection, PERRL and conjunctivae and sclerae normal  HENT: ear canals and TM's " normal, nose and mouth without ulcers or lesions  NECK: no adenopathy, no asymmetry, masses, or scars and thyroid normal to palpation  RESP: lungs clear to auscultation - no rales, rhonchi or wheezes  CV: regular rate and rhythm, normal S1 S2, no S3 or S4, no murmur, click or rub, no peripheral edema and peripheral pulses strong  ABDOMEN: soft, nontender, no hepatosplenomegaly, no masses and bowel sounds normal  MS: no gross musculoskeletal defects noted, no edema  SKIN: no suspicious lesions or rashes  NEURO: Normal strength and tone, mentation intact and speech normal  PSYCH: mentation appears normal, affect normal/bright    Results for orders placed or performed in visit on 10/02/23   Albumin Random Urine Quantitative with Creat Ratio     Status: None   Result Value Ref Range    Creatinine Urine mg/dL 224.9 mg/dL    Albumin Urine mg/L 23.7 mg/L    Albumin Urine mg/g Cr 10.54 0.00 - 17.00 mg/g Cr     Component      Latest Ref [facility] 5/2/2023  8:27 AM   WBC      4.0 - 11.0 10e3/uL 5.7    RBC Count      4.40 - 5.90 10e6/uL 4.97    Hemoglobin      13.3 - 17.7 g/dL 15.7    Hematocrit      40.0 - 53.0 % 45.3    MCV      78 - 100 fL 91    MCH      26.5 - 33.0 pg 31.6    MCHC      31.5 - 36.5 g/dL 34.7    RDW      10.0 - 15.0 % 12.5    Platelet Count      150 - 450 10e3/uL 191    % Neutrophils      % 54    % Lymphocytes      % 37    % Monocytes      % 7    % Eosinophils      % 2    % Basophils      % 0    % Immature Granulocytes      % 0    NRBCs per 100 WBC      <1 /100 0    Absolute Neutrophils      1.6 - 8.3 10e3/uL 3.1    Absolute Lymphocytes      0.8 - 5.3 10e3/uL 2.1    Absolute Monocytes      0.0 - 1.3 10e3/uL 0.4    Absolute Eosinophils      0.0 - 0.7 10e3/uL 0.1    Absolute Basophils      0.0 - 0.2 10e3/uL 0.0    Absolute Immature Granulocytes      <=0.4 10e3/uL 0.0    Absolute NRBCs      10e3/uL 0.0    Sodium      136 - 145 mmol/L 139    Potassium      3.4 - 5.3 mmol/L 4.5    Chloride      98 - 107 mmol/L 102     Carbon Dioxide (CO2)      22 - 29 mmol/L 29    Anion Gap      7 - 15 mmol/L 8    Urea Nitrogen      8.0 - 23.0 mg/dL 13.2    Creatinine      0.67 - 1.17 mg/dL 0.89    Calcium      8.8 - 10.2 mg/dL 9.1    Glucose      70 - 99 mg/dL 95    Alkaline Phosphatase      40 - 129 U/L 66    AST      10 - 50 U/L 33    ALT      10 - 50 U/L 64 (H)    Protein Total      6.4 - 8.3 g/dL 7.5    Albumin      3.5 - 5.2 g/dL 4.4    Bilirubin Total      <=1.2 mg/dL 0.7    GFR Estimate      >60 mL/min/1.73m2 >90       Component      Latest Ref Rng 5/2/2023  8:27 AM   Cholesterol      <200 mg/dL 113    Triglycerides      <150 mg/dL 52    HDL Cholesterol      >=40 mg/dL 37 (L)    LDL Cholesterol Calculated      <=100 mg/dL 66    Non HDL Cholesterol      <130 mg/dL 76       Legend:  (L) Low  Legend:  (H) High      ASSESSMENT / PLAN:   (Z00.00) Wellness examination  (primary encounter diagnosis)  Comment: Overall is up to date on most HM needs.  Annual vaccinations due and age 63 HM review indicates numerous vaccines available.  Because he is on an immune modulating drug for his psoriasis, I am recommending he get one vaccine at a time with a 3-4 week pause between vaccines to give him the best immune uptake of each vaccine.   Up to date on cancer screenings.   Plan: Flu shot given today.  He can return or go to his pharmacy in 3-4 weeks for COVID booster, then in another 3-4 weeks get his prevnar 20 and finally in 3-4 weeks after that can get the RSV vaccination.      (I10) Benign essential hypertension  Comment: Stable on current meds; no change indicated. Labs for renal and electrolyte issues are stable, so no changes indicated.   Plan: Continue same meds; recheck in 6 months.     (I25.118) Coronary artery disease of native artery of native heart with stable angina pectoris (H24)  Comment: Stable on current meds.    Plan: Continue same    (E78.5) Hyperlipidemia with target LDL less than 100  Comment: Labs stable on current dose of  "lipitor 80 mg daily  Plan: No changes    (L40.8) Other psoriasis  Comment: On immune modulator therapy at this time and keeping things under control.   Plan: No changes in current plan of care.           COUNSELING:  Reviewed preventive health counseling, as reflected in patient instructions       Regular exercise       Healthy diet/nutrition       Dental care      BMI:   Estimated body mass index is 26.82 kg/m  as calculated from the following:    Height as of this encounter: 1.816 m (5' 11.5\").    Weight as of this encounter: 88.5 kg (195 lb).   Weight management plan: Discussed healthy diet and exercise guidelines      He reports that he has quit smoking. His smoking use included cigarettes. He has a 24.00 pack-year smoking history. He has never used smokeless tobacco.      Appropriate preventive services were discussed with this patient, including applicable screening as appropriate for fall prevention, nutrition, physical activity, Tobacco-use cessation, weight loss and cognition.  Checklist reviewing preventive services available has been given to the patient.    Reviewed patients plan of care and provided an AVS. The Intermediate Care Plan ( asthma action plan, low back pain action plan, and migraine action plan) for Leonides meets the Care Plan requirement. This Care Plan has been established and reviewed with the patient.      Gregory G. Schoen, MD  [facility]    "

## 2023-11-28 DIAGNOSIS — I10 BENIGN ESSENTIAL HYPERTENSION: ICD-10-CM

## 2023-11-28 DIAGNOSIS — I25.10 CORONARY ARTERY DISEASE INVOLVING NATIVE CORONARY ARTERY OF NATIVE HEART WITHOUT ANGINA PECTORIS: ICD-10-CM

## 2023-11-28 RX ORDER — AMLODIPINE BESYLATE 5 MG/1
5 TABLET ORAL DAILY
Qty: 90 TABLET | Refills: 1 | Status: SHIPPED | OUTPATIENT
Start: 2023-11-28 | End: 2024-04-08

## 2023-11-28 RX ORDER — CLOPIDOGREL BISULFATE 75 MG/1
75 TABLET ORAL DAILY
Qty: 90 TABLET | Refills: 1 | Status: SHIPPED | OUTPATIENT
Start: 2023-11-28 | End: 2024-05-09

## 2024-01-04 DIAGNOSIS — E78.5 HYPERLIPIDEMIA LDL GOAL <70: ICD-10-CM

## 2024-01-04 RX ORDER — ATORVASTATIN CALCIUM 80 MG/1
80 TABLET, FILM COATED ORAL DAILY
Qty: 90 TABLET | Refills: 1 | Status: SHIPPED | OUTPATIENT
Start: 2024-01-04 | End: 2024-05-06

## 2024-01-11 DIAGNOSIS — E78.5 HYPERLIPIDEMIA LDL GOAL <70: ICD-10-CM

## 2024-01-11 RX ORDER — EZETIMIBE 10 MG/1
10 TABLET ORAL DAILY
Qty: 90 TABLET | Refills: 1 | Status: SHIPPED | OUTPATIENT
Start: 2024-01-11 | End: 2024-05-09

## 2024-01-15 DIAGNOSIS — I25.10 CORONARY ARTERY DISEASE INVOLVING NATIVE CORONARY ARTERY OF NATIVE HEART WITHOUT ANGINA PECTORIS: ICD-10-CM

## 2024-01-15 RX ORDER — METOPROLOL SUCCINATE 50 MG/1
50 TABLET, EXTENDED RELEASE ORAL DAILY
Qty: 90 TABLET | Refills: 1 | Status: SHIPPED | OUTPATIENT
Start: 2024-01-15 | End: 2024-05-09

## 2024-04-08 DIAGNOSIS — I10 BENIGN ESSENTIAL HYPERTENSION: ICD-10-CM

## 2024-04-08 RX ORDER — AMLODIPINE BESYLATE 5 MG/1
5 TABLET ORAL DAILY
Qty: 90 TABLET | Refills: 0 | Status: SHIPPED | OUTPATIENT
Start: 2024-04-08 | End: 2024-05-09

## 2024-04-25 DIAGNOSIS — N52.2 DRUG-INDUCED ERECTILE DYSFUNCTION: ICD-10-CM

## 2024-04-25 RX ORDER — TADALAFIL 20 MG/1
TABLET ORAL
Qty: 90 TABLET | Refills: 0 | Status: SHIPPED | OUTPATIENT
Start: 2024-04-25

## 2024-04-29 DIAGNOSIS — Z79.899 NEED FOR PROPHYLACTIC CHEMOTHERAPY: Primary | ICD-10-CM

## 2024-05-06 ENCOUNTER — MYC MEDICAL ADVICE (OUTPATIENT)
Dept: CARDIOLOGY | Facility: CLINIC | Age: 64
End: 2024-05-06
Payer: COMMERCIAL

## 2024-05-06 DIAGNOSIS — E78.5 HYPERLIPIDEMIA LDL GOAL <70: ICD-10-CM

## 2024-05-06 RX ORDER — ATORVASTATIN CALCIUM 80 MG/1
80 TABLET, FILM COATED ORAL DAILY
Qty: 90 TABLET | Refills: 3 | Status: SHIPPED | OUTPATIENT
Start: 2024-05-06 | End: 2024-05-09

## 2024-05-06 NOTE — TELEPHONE ENCOUNTER
Per pt. Request pended Atorvastatin Refill to be sent to [facility].     Tish Clifford on 5/6/2024 at 9:32 AM

## 2024-05-09 ENCOUNTER — OFFICE VISIT (OUTPATIENT)
Dept: CARDIOLOGY | Facility: CLINIC | Age: 64
End: 2024-05-09
Payer: COMMERCIAL

## 2024-05-09 ENCOUNTER — LAB (OUTPATIENT)
Dept: LAB | Facility: CLINIC | Age: 64
End: 2024-05-09
Payer: COMMERCIAL

## 2024-05-09 VITALS
HEART RATE: 52 BPM | HEIGHT: 72 IN | RESPIRATION RATE: 16 BRPM | SYSTOLIC BLOOD PRESSURE: 112 MMHG | BODY MASS INDEX: 27.12 KG/M2 | WEIGHT: 200.2 LBS | DIASTOLIC BLOOD PRESSURE: 78 MMHG | OXYGEN SATURATION: 98 %

## 2024-05-09 DIAGNOSIS — E78.5 HYPERLIPIDEMIA LDL GOAL <70: ICD-10-CM

## 2024-05-09 DIAGNOSIS — I25.10 CORONARY ARTERY DISEASE INVOLVING NATIVE CORONARY ARTERY OF NATIVE HEART WITHOUT ANGINA PECTORIS: ICD-10-CM

## 2024-05-09 DIAGNOSIS — I25.118 CORONARY ARTERY DISEASE OF NATIVE ARTERY OF NATIVE HEART WITH STABLE ANGINA PECTORIS (H): ICD-10-CM

## 2024-05-09 DIAGNOSIS — I10 BENIGN ESSENTIAL HYPERTENSION: ICD-10-CM

## 2024-05-09 DIAGNOSIS — Z79.899 NEED FOR PROPHYLACTIC CHEMOTHERAPY: ICD-10-CM

## 2024-05-09 LAB
ALT SERPL W P-5'-P-CCNC: 55 U/L (ref 0–70)
ANION GAP SERPL CALCULATED.3IONS-SCNC: 11 MMOL/L (ref 7–15)
BUN SERPL-MCNC: 13.7 MG/DL (ref 8–23)
CALCIUM SERPL-MCNC: 9.4 MG/DL (ref 8.8–10.2)
CHLORIDE SERPL-SCNC: 103 MMOL/L (ref 98–107)
CHOLEST SERPL-MCNC: 105 MG/DL
CREAT SERPL-MCNC: 0.95 MG/DL (ref 0.67–1.17)
DEPRECATED HCO3 PLAS-SCNC: 25 MMOL/L (ref 22–29)
EGFRCR SERPLBLD CKD-EPI 2021: 88 ML/MIN/1.73M2
FASTING STATUS PATIENT QL REPORTED: YES
FASTING STATUS PATIENT QL REPORTED: YES
GLUCOSE SERPL-MCNC: 93 MG/DL (ref 70–99)
HDLC SERPL-MCNC: 41 MG/DL
LDLC SERPL CALC-MCNC: 52 MG/DL
NONHDLC SERPL-MCNC: 64 MG/DL
POTASSIUM SERPL-SCNC: 4.4 MMOL/L (ref 3.4–5.3)
SODIUM SERPL-SCNC: 139 MMOL/L (ref 135–145)
TRIGL SERPL-MCNC: 60 MG/DL

## 2024-05-09 PROCEDURE — 84460 ALANINE AMINO (ALT) (SGPT): CPT

## 2024-05-09 PROCEDURE — 36415 COLL VENOUS BLD VENIPUNCTURE: CPT

## 2024-05-09 PROCEDURE — 86481 TB AG RESPONSE T-CELL SUSP: CPT

## 2024-05-09 PROCEDURE — 80048 BASIC METABOLIC PNL TOTAL CA: CPT

## 2024-05-09 PROCEDURE — 80061 LIPID PANEL: CPT

## 2024-05-09 PROCEDURE — 99214 OFFICE O/P EST MOD 30 MIN: CPT | Performed by: INTERNAL MEDICINE

## 2024-05-09 RX ORDER — ATORVASTATIN CALCIUM 80 MG/1
80 TABLET, FILM COATED ORAL DAILY
Qty: 90 TABLET | Refills: 4 | Status: SHIPPED | OUTPATIENT
Start: 2024-05-09

## 2024-05-09 RX ORDER — CLOPIDOGREL BISULFATE 75 MG/1
75 TABLET ORAL DAILY
Qty: 90 TABLET | Refills: 4 | Status: SHIPPED | OUTPATIENT
Start: 2024-05-09

## 2024-05-09 RX ORDER — AMLODIPINE BESYLATE 5 MG/1
5 TABLET ORAL DAILY
Qty: 90 TABLET | Refills: 4 | Status: SHIPPED | OUTPATIENT
Start: 2024-05-09

## 2024-05-09 RX ORDER — EZETIMIBE 10 MG/1
10 TABLET ORAL DAILY
Qty: 90 TABLET | Refills: 4 | Status: SHIPPED | OUTPATIENT
Start: 2024-05-09

## 2024-05-09 RX ORDER — METOPROLOL SUCCINATE 50 MG/1
50 TABLET, EXTENDED RELEASE ORAL DAILY
Qty: 90 TABLET | Refills: 4 | Status: SHIPPED | OUTPATIENT
Start: 2024-05-09

## 2024-05-09 ASSESSMENT — PAIN SCALES - GENERAL: PAINLEVEL: NO PAIN (0)

## 2024-05-09 NOTE — LETTER
5/9/2024    Gregory G. Schoen, MD  [address] [place] [address]    RE: Leonides Paul       Dear Colleague,     I had the pleasure of seeing Leonides Paul in the [facility].    General Cardiology Clinic Progress Note  Leonides Paul MRN# [id number]   YOB: 1960 Age: 64 year old       Reason for visit: Coronary artery disease    History of presenting illness:    I had the opportunity to see Leonides Paul at [facility] today for coronary artery disease and chronic stable angina.  His mid LAD is chronically occluded and supplied by collaterals which we have been managing medically for many years.  He continues to stay active and only rarely notices typical central chest discomfort with walking too far and too fast.  This is a very stable pattern of chest discomfort and he knows his limits.  He has also noticed a little bit of back discomfort at those times as well.  If he slows down or stops, the distal chest discomfort resolves immediately.    He has no other symptoms such as shortness of breath palpitations lightheadedness or syncope.    His cholesterol numbers today were excellent with a total cholesterol of 105, HDL 41, LDL 52, triglycerides 60.  Basic metabolic panel is normal.  ALT is 55.    On examination today his blood pressure is 112/78, heart rate 52, and weight 200 pounds.  His lungs are clear his heart rhythm is regular he has no cardiac murmurs or carotid bruits                Assessment and Plan:     ASSESSMENT:    Mr. Leonides Paul is a 64-year-old gentleman wit hypertension, dyslipidemia, and coronary artery disease with a chronically occluded mid LAD with collaterals and chronic stable angina.  This is a very stable pattern of chronic angina at the same level of exertion for quite a few years.  He continues to be active and generally feels good.  He does not find his angina limiting in any way.  His cardiac risk factors are under good  control.    I will not make any changes to his medication program and plan on seeing him back again in 1 year for reevaluation.    Tay Hernandez MD           Orders this Visit:  Orders Placed This Encounter   Procedures    Basic metabolic panel    CBC with platelets    Lipid Profile    ALT    Follow-Up with Cardiology     Orders Placed This Encounter   Medications    amLODIPine (NORVASC) 5 MG tablet     Sig: Take 1 tablet (5 mg) by mouth daily     Dispense:  90 tablet     Refill:  4    atorvastatin (LIPITOR) 80 MG tablet     Sig: Take 1 tablet (80 mg) by mouth daily     Dispense:  90 tablet     Refill:  4    clopidogrel (PLAVIX) 75 MG tablet     Sig: Take 1 tablet (75 mg) by mouth daily     Dispense:  90 tablet     Refill:  4    metoprolol succinate ER (TOPROL XL) 50 MG 24 hr tablet     Sig: Take 1 tablet (50 mg) by mouth daily     Dispense:  90 tablet     Refill:  4    ezetimibe (ZETIA) 10 MG tablet     Sig: Take 1 tablet (10 mg) by mouth daily     Dispense:  90 tablet     Refill:  4     Medications Discontinued During This Encounter   Medication Reason    clopidogrel (PLAVIX) 75 MG tablet Reorder (No AVS)    ezetimibe (ZETIA) 10 MG tablet Reorder (No AVS)    metoprolol succinate ER (TOPROL XL) 50 MG 24 hr tablet Reorder (No AVS)    amLODIPine (NORVASC) 5 MG tablet Reorder (No AVS)    atorvastatin (LIPITOR) 80 MG tablet Reorder (No AVS)       Today's clinic visit entailed:    30 minutes spent by me on the date of the encounter doing chart review, history and exam, documentation and further activities per the note  Provider  Link to [facility] Help Grid     The level of medical decision making during this visit was of high complexity.           Review of Systems:     Review of Systems:  Skin:        Eyes:       ENT:       Respiratory:  Negative shortness of breath;cough;wheezing  Cardiovascular:  Negative;palpitations;chest pain;edema;lightheadedness;dizziness;syncope or near-syncope    Gastroenterology:      Genitourinary:  "      Musculoskeletal:       Neurologic:  Negative numbness or tingling of hands;numbness or tingling of feet  Psychiatric:  Positive for sleep disturbances  Heme/Lymph/Imm:       Endocrine:                 Physical Exam:     Vitals: /78 (BP Location: Right arm, Patient Position: Sitting, Cuff Size: Adult Regular)   Pulse 52   Resp 16   Ht 1.816 m (5' 11.5\")   Wt 90.8 kg (200 lb 3.2 oz)   SpO2 98%   BMI 27.53 kg/m    Constitutional: Well nourished and in no apparent distress.  Eyes: Pupils equal, round. Sclerae anicteric.   HEENT: Normocephalic, atraumatic.   Neck: Supple. JVD   Respiratory: Breathing non-labored. Lungs clear to auscultation bilaterally. No crackles, wheezes, rhonchi, or rales.  Cardiovascular:  Regular rate and rhythm, normal S1 and S2. No murmur, rub, or gallop.  Skin: Warm, dry. No rashes, cyanosis, or xanthelasma.  Extremities: No edema.  Neurologic: No gross motor deficits. Alert, awake, and oriented to person, place and time.  Psychiatric: Affect appropriate.             Medications:     Current Outpatient Medications   Medication Sig Dispense Refill    amLODIPine (NORVASC) 5 MG tablet Take 1 tablet (5 mg) by mouth daily 90 tablet 4    atorvastatin (LIPITOR) 80 MG tablet Take 1 tablet (80 mg) by mouth daily 90 tablet 4    clopidogrel (PLAVIX) 75 MG tablet Take 1 tablet (75 mg) by mouth daily 90 tablet 4    ezetimibe (ZETIA) 10 MG tablet Take 1 tablet (10 mg) by mouth daily 90 tablet 4    guselkumab (TREMFYA) 100 MG/ML SOSY Inject 100 mg Subcutaneous once      metoprolol succinate ER (TOPROL XL) 50 MG 24 hr tablet Take 1 tablet (50 mg) by mouth daily 90 tablet 4    tadalafil (CIALIS) 20 MG tablet TAKE 1 TABLET DAILY AS     NEEDED Strength: 20 mg 90 tablet 0    ACE/ARB NOT PRESCRIBED, INTENTIONAL, by Other route continuous prn (Patient not taking: Reported on 5/9/2024)         Family History   Problem Relation Age of Onset    Allergies Daughter         environment al    Allergies Son "         same as sister    Alzheimer Disease Father          at age 73    Blood Disease Father     Cancer Maternal Grandfather         [place] lung -    Heart Disease Maternal Grandmother     Psychotic Disorder Brother        Social History     Socioeconomic History    Marital status:      Spouse name: Not on file    Number of children: Not on file    Years of education: Not on file    Highest education level: Not on file   Occupational History    Not on file   Tobacco Use    Smoking status: Former     Current packs/day: 2.00     Average packs/day: 2.0 packs/day for 12.0 years (24.0 ttl pk-yrs)     Types: Cigarettes    Smokeless tobacco: Never    Tobacco comments:     Quit at age 25   Vaping Use    Vaping status: Never Used   Substance and Sexual Activity    Alcohol use: Yes     Comment: beer weekly    Drug use: No    Sexual activity: Yes     Partners: Female   Other Topics Concern    Parent/sibling w/ CABG, MI or angioplasty before 65F 55M? Not Asked   Social History Narrative    Not on file     Social Determinants of Health     Financial Resource Strain: Low Risk  (2023)    Financial Resource Strain     Within the past 12 months, have you or your family members you live with been unable to get utilities (heat, electricity) when it was really needed?: No   Food Insecurity: Low Risk  (2023)    Food Insecurity     Within the past 12 months, did you worry that your food would run out before you got money to buy more?: No     Within the past 12 months, did the food you bought just not last and you didn t have money to get more?: No   Transportation Needs: Low Risk  (2023)    Transportation Needs     Within the past 12 months, has lack of transportation kept you from medical appointments, getting your medicines, non-medical meetings or appointments, work, or from getting things that you need?: No   Physical Activity: Not on file   Stress: Not on file   Social Connections: Not on file    Interpersonal Safety: Low Risk  (10/2/2023)    Interpersonal Safety     Do you feel physically and emotionally safe where you currently live?: Yes     Within the past 12 months, have you been hit, slapped, kicked or otherwise physically hurt by someone?: No     Within the past 12 months, have you been humiliated or emotionally abused in other ways by your partner or ex-partner?: No   Housing Stability: Low Risk  (9/26/2023)    Housing Stability     Do you have housing? : Yes     Are you worried about losing your housing?: No            Past Medical History:     Past Medical History:   Diagnosis Date    CAD (coronary artery disease) 7/19/2010    History of angina pectoris 3/1/2018    Hyperlipidemia LDL goal < 100 7/19/2010    Hypertension 7/19/2010    Other psoriasis 12/15/2005              Past Surgical History:     Past Surgical History:   Procedure Laterality Date    COLONOSCOPY  02/10/10    COLONOSCOPY N/A 12/15/2021    Procedure: COLONOSCOPY;  Surgeon: Rehan Chang MD;  Location:  GI    HC LAPAROSCOPY, SURGICAL; APPENDECTOMY  7/17/10    REMOVAL OF SPERM DUCT(S)                Allergies:   No known drug allergy       Data:   All laboratory data reviewed:    Recent Labs   Lab Test 05/09/24  0801 05/02/23  0827 04/19/22  0838   LDL 52 66 55   HDL 41 37* 36*   NHDL 64 76 73   CHOL 105 113 109   TRIG 60 52 90       Lab Results   Component Value Date    WBC 5.7 05/02/2023    WBC 6.2 05/04/2021    RBC 4.97 05/02/2023    RBC 5.20 05/04/2021    HGB 15.7 05/02/2023    HGB 16.5 05/04/2021    HCT 45.3 05/02/2023    HCT 47.9 05/04/2021    MCV 91 05/02/2023    MCV 92 05/04/2021    MCH 31.6 05/02/2023    MCH 31.7 05/04/2021    MCHC 34.7 05/02/2023    MCHC 34.4 05/04/2021    RDW 12.5 05/02/2023    RDW 12.9 05/04/2021     05/02/2023     05/04/2021       Lab Results   Component Value Date     05/09/2024     05/04/2021    POTASSIUM 4.4 05/09/2024    POTASSIUM 4.4 04/19/2022    POTASSIUM 4.4  "05/04/2021    CHLORIDE 103 05/09/2024    CHLORIDE 108 04/19/2022    CHLORIDE 107 05/04/2021    CO2 25 05/09/2024    CO2 30 04/19/2022    CO2 29 05/04/2021    ANIONGAP 11 05/09/2024    ANIONGAP 3 04/19/2022    ANIONGAP 3 05/04/2021    GLC 93 05/09/2024     (H) 04/19/2022    GLC 92 05/04/2021    BUN 13.7 05/09/2024    BUN 14 04/19/2022    BUN 15 05/04/2021    CR 0.95 05/09/2024    CR 0.92 05/04/2021    GFRESTIMATED 88 05/09/2024    GFRESTIMATED 89 05/04/2021    GFRESTBLACK >90 05/04/2021    JUAN JOSE 9.4 05/09/2024    JUAN JOSE 9.2 05/04/2021      Lab Results   Component Value Date    AST 33 05/02/2023    AST 23 05/04/2021    ALT 55 05/09/2024    ALT 53 05/04/2021       No results found for: \"A1C\"    No results found for: \"INR\"      TAY HERNANDEZ MD  [facility]      Thank you for allowing me to participate in the care of your patient.      Sincerely,     TAY HERNANDEZ MD     [facility]  cc:   Tay Hernandez MD  [address]  [place] [address]    "

## 2024-05-09 NOTE — PROGRESS NOTES
General Cardiology Clinic Progress Note  Leonides Paul MRN# [id number]   YOB: 1960 Age: 64 year old       Reason for visit: Coronary artery disease    History of presenting illness:    I had the opportunity to see Leonides Paul at [facility] today for coronary artery disease and chronic stable angina.  His mid LAD is chronically occluded and supplied by collaterals which we have been managing medically for many years.  He continues to stay active and only rarely notices typical central chest discomfort with walking too far and too fast.  This is a very stable pattern of chest discomfort and he knows his limits.  He has also noticed a little bit of back discomfort at those times as well.  If he slows down or stops, the distal chest discomfort resolves immediately.    He has no other symptoms such as shortness of breath palpitations lightheadedness or syncope.    His cholesterol numbers today were excellent with a total cholesterol of 105, HDL 41, LDL 52, triglycerides 60.  Basic metabolic panel is normal.  ALT is 55.    On examination today his blood pressure is 112/78, heart rate 52, and weight 200 pounds.  His lungs are clear his heart rhythm is regular he has no cardiac murmurs or carotid bruits                Assessment and Plan:     ASSESSMENT:    Mr. Leonides Paul is a 64-year-old gentleman wit hypertension, dyslipidemia, and coronary artery disease with a chronically occluded mid LAD with collaterals and chronic stable angina.  This is a very stable pattern of chronic angina at the same level of exertion for quite a few years.  He continues to be active and generally feels good.  He does not find his angina limiting in any way.  His cardiac risk factors are under good control.    I will not make any changes to his medication program and plan on seeing him back again in 1 year for reevaluation.    Tay Hernandez MD           Orders this Visit:  Orders Placed This Encounter    Procedures    Basic metabolic panel    CBC with platelets    Lipid Profile    ALT    Follow-Up with Cardiology     Orders Placed This Encounter   Medications    amLODIPine (NORVASC) 5 MG tablet     Sig: Take 1 tablet (5 mg) by mouth daily     Dispense:  90 tablet     Refill:  4    atorvastatin (LIPITOR) 80 MG tablet     Sig: Take 1 tablet (80 mg) by mouth daily     Dispense:  90 tablet     Refill:  4    clopidogrel (PLAVIX) 75 MG tablet     Sig: Take 1 tablet (75 mg) by mouth daily     Dispense:  90 tablet     Refill:  4    metoprolol succinate ER (TOPROL XL) 50 MG 24 hr tablet     Sig: Take 1 tablet (50 mg) by mouth daily     Dispense:  90 tablet     Refill:  4    ezetimibe (ZETIA) 10 MG tablet     Sig: Take 1 tablet (10 mg) by mouth daily     Dispense:  90 tablet     Refill:  4     Medications Discontinued During This Encounter   Medication Reason    clopidogrel (PLAVIX) 75 MG tablet Reorder (No AVS)    ezetimibe (ZETIA) 10 MG tablet Reorder (No AVS)    metoprolol succinate ER (TOPROL XL) 50 MG 24 hr tablet Reorder (No AVS)    amLODIPine (NORVASC) 5 MG tablet Reorder (No AVS)    atorvastatin (LIPITOR) 80 MG tablet Reorder (No AVS)       Today's clinic visit entailed:    30 minutes spent by me on the date of the encounter doing chart review, history and exam, documentation and further activities per the note  Provider  Link to [facility] Help Grid     The level of medical decision making during this visit was of high complexity.           Review of Systems:     Review of Systems:  Skin:        Eyes:       ENT:       Respiratory:  Negative shortness of breath;cough;wheezing  Cardiovascular:  Negative;palpitations;chest pain;edema;lightheadedness;dizziness;syncope or near-syncope    Gastroenterology:      Genitourinary:       Musculoskeletal:       Neurologic:  Negative numbness or tingling of hands;numbness or tingling of feet  Psychiatric:  Positive for sleep disturbances  Heme/Lymph/Imm:       Endocrine:                "  Physical Exam:     Vitals: /78 (BP Location: Right arm, Patient Position: Sitting, Cuff Size: Adult Regular)   Pulse 52   Resp 16   Ht 1.816 m (5' 11.5\")   Wt 90.8 kg (200 lb 3.2 oz)   SpO2 98%   BMI 27.53 kg/m    Constitutional: Well nourished and in no apparent distress.  Eyes: Pupils equal, round. Sclerae anicteric.   HEENT: Normocephalic, atraumatic.   Neck: Supple. JVD   Respiratory: Breathing non-labored. Lungs clear to auscultation bilaterally. No crackles, wheezes, rhonchi, or rales.  Cardiovascular:  Regular rate and rhythm, normal S1 and S2. No murmur, rub, or gallop.  Skin: Warm, dry. No rashes, cyanosis, or xanthelasma.  Extremities: No edema.  Neurologic: No gross motor deficits. Alert, awake, and oriented to person, place and time.  Psychiatric: Affect appropriate.             Medications:     Current Outpatient Medications   Medication Sig Dispense Refill    amLODIPine (NORVASC) 5 MG tablet Take 1 tablet (5 mg) by mouth daily 90 tablet 4    atorvastatin (LIPITOR) 80 MG tablet Take 1 tablet (80 mg) by mouth daily 90 tablet 4    clopidogrel (PLAVIX) 75 MG tablet Take 1 tablet (75 mg) by mouth daily 90 tablet 4    ezetimibe (ZETIA) 10 MG tablet Take 1 tablet (10 mg) by mouth daily 90 tablet 4    guselkumab (TREMFYA) 100 MG/ML SOSY Inject 100 mg Subcutaneous once      metoprolol succinate ER (TOPROL XL) 50 MG 24 hr tablet Take 1 tablet (50 mg) by mouth daily 90 tablet 4    tadalafil (CIALIS) 20 MG tablet TAKE 1 TABLET DAILY AS     NEEDED Strength: 20 mg 90 tablet 0    ACE/ARB NOT PRESCRIBED, INTENTIONAL, by Other route continuous prn (Patient not taking: Reported on 2024)         Family History   Problem Relation Age of Onset    Allergies Daughter         environment al    Allergies Son         same as sister    Alzheimer Disease Father          at age 73    Blood Disease Father     Cancer Maternal Grandfather         [place] lung -    Heart Disease Maternal Grandmother     " Psychotic Disorder Brother        Social History     Socioeconomic History    Marital status:      Spouse name: Not on file    Number of children: Not on file    Years of education: Not on file    Highest education level: Not on file   Occupational History    Not on file   Tobacco Use    Smoking status: Former     Current packs/day: 2.00     Average packs/day: 2.0 packs/day for 12.0 years (24.0 ttl pk-yrs)     Types: Cigarettes    Smokeless tobacco: Never    Tobacco comments:     Quit at age 25   Vaping Use    Vaping status: Never Used   Substance and Sexual Activity    Alcohol use: Yes     Comment: beer weekly    Drug use: No    Sexual activity: Yes     Partners: Female   Other Topics Concern    Parent/sibling w/ CABG, MI or angioplasty before 65F 55M? Not Asked   Social History Narrative    Not on file     Social Determinants of Health     Financial Resource Strain: Low Risk  (9/26/2023)    Financial Resource Strain     Within the past 12 months, have you or your family members you live with been unable to get utilities (heat, electricity) when it was really needed?: No   Food Insecurity: Low Risk  (9/26/2023)    Food Insecurity     Within the past 12 months, did you worry that your food would run out before you got money to buy more?: No     Within the past 12 months, did the food you bought just not last and you didn t have money to get more?: No   Transportation Needs: Low Risk  (9/26/2023)    Transportation Needs     Within the past 12 months, has lack of transportation kept you from medical appointments, getting your medicines, non-medical meetings or appointments, work, or from getting things that you need?: No   Physical Activity: Not on file   Stress: Not on file   Social Connections: Not on file   Interpersonal Safety: Low Risk  (10/2/2023)    Interpersonal Safety     Do you feel physically and emotionally safe where you currently live?: Yes     Within the past 12 months, have you been hit,  slapped, kicked or otherwise physically hurt by someone?: No     Within the past 12 months, have you been humiliated or emotionally abused in other ways by your partner or ex-partner?: No   Housing Stability: Low Risk  (9/26/2023)    Housing Stability     Do you have housing? : Yes     Are you worried about losing your housing?: No            Past Medical History:     Past Medical History:   Diagnosis Date    CAD (coronary artery disease) 7/19/2010    History of angina pectoris 3/1/2018    Hyperlipidemia LDL goal < 100 7/19/2010    Hypertension 7/19/2010    Other psoriasis 12/15/2005              Past Surgical History:     Past Surgical History:   Procedure Laterality Date    COLONOSCOPY  02/10/10    COLONOSCOPY N/A 12/15/2021    Procedure: COLONOSCOPY;  Surgeon: Rehan Chang MD;  Location: [facility]    [facility] LAPAROSCOPY, SURGICAL; APPENDECTOMY  7/17/10    REMOVAL OF SPERM DUCT(S)                Allergies:   No known drug allergy       Data:   All laboratory data reviewed:    Recent Labs   Lab Test 05/09/24  0801 05/02/23  0827 04/19/22  0838   LDL 52 66 55   HDL 41 37* 36*   NHDL 64 76 73   CHOL 105 113 109   TRIG 60 52 90       Lab Results   Component Value Date    WBC 5.7 05/02/2023    WBC 6.2 05/04/2021    RBC 4.97 05/02/2023    RBC 5.20 05/04/2021    HGB 15.7 05/02/2023    HGB 16.5 05/04/2021    HCT 45.3 05/02/2023    HCT 47.9 05/04/2021    MCV 91 05/02/2023    MCV 92 05/04/2021    MCH 31.6 05/02/2023    MCH 31.7 05/04/2021    MCHC 34.7 05/02/2023    MCHC 34.4 05/04/2021    RDW 12.5 05/02/2023    RDW 12.9 05/04/2021     05/02/2023     05/04/2021       Lab Results   Component Value Date     05/09/2024     05/04/2021    POTASSIUM 4.4 05/09/2024    POTASSIUM 4.4 04/19/2022    POTASSIUM 4.4 05/04/2021    CHLORIDE 103 05/09/2024    CHLORIDE 108 04/19/2022    CHLORIDE 107 05/04/2021    CO2 25 05/09/2024    CO2 30 04/19/2022    CO2 29 05/04/2021    ANIONGAP 11 05/09/2024    ANIONGAP 3 04/19/2022  "   ANIONGAP 3 05/04/2021    GLC 93 05/09/2024     (H) 04/19/2022    GLC 92 05/04/2021    BUN 13.7 05/09/2024    BUN 14 04/19/2022    BUN 15 05/04/2021    CR 0.95 05/09/2024    CR 0.92 05/04/2021    GFRESTIMATED 88 05/09/2024    GFRESTIMATED 89 05/04/2021    GFRESTBLACK >90 05/04/2021    JUAN JOSE 9.4 05/09/2024    JUAN JOSE 9.2 05/04/2021      Lab Results   Component Value Date    AST 33 05/02/2023    AST 23 05/04/2021    ALT 55 05/09/2024    ALT 53 05/04/2021       No results found for: \"A1C\"    No results found for: \"INR\"      MENDOZA BATES MD  [facility]  "

## 2024-05-10 LAB
QUANTIFERON MITOGEN: 10 IU/ML
QUANTIFERON NIL TUBE: 0 IU/ML
QUANTIFERON TB1 TUBE: 0.02 IU/ML
QUANTIFERON TB2 TUBE: 0

## 2024-05-13 LAB
GAMMA INTERFERON BACKGROUND BLD IA-ACNC: 0 IU/ML
M TB IFN-G BLD-IMP: NEGATIVE
M TB IFN-G CD4+ BCKGRND COR BLD-ACNC: 10 IU/ML
MITOGEN IGNF BCKGRD COR BLD-ACNC: 0 IU/ML
MITOGEN IGNF BCKGRD COR BLD-ACNC: 0.02 IU/ML

## 2024-05-16 ENCOUNTER — TELEPHONE (OUTPATIENT)
Dept: FAMILY MEDICINE | Facility: CLINIC | Age: 64
End: 2024-05-16
Payer: COMMERCIAL

## 2024-05-16 NOTE — TELEPHONE ENCOUNTER
S-(situation):   Patient reports he is having trouble with sleeping, he thinks it may be due to anxiety.     B-(background):   Patient reports on and off sleep troubles for a couple years (3 years). He reports he was fine for a year and a half then reports difficulties started coming back the last few months.     A-(assessment):   He has trouble falling asleep, once asleep he is able to sleep.   He reports is taking Benadryl to help with sleep and reports it seems to help but is requesting medication medication to help with sleep.     Recent changes are that his son and his wife with their  and cats moved into his home. Unsure if this is causing symptoms or could be contributing.     R-(recommendations):   Patient should see provider to discuss options for managing sleep.  Are you able to work patient in for an assessment of sleep concerns either in clinic or virtually? E-visit appropriate?    ELIOT Cole, RN  [facility] ~ Registered Nurse  Clinic Triage  May 16, 2024

## 2024-05-24 ENCOUNTER — OFFICE VISIT (OUTPATIENT)
Dept: FAMILY MEDICINE | Facility: CLINIC | Age: 64
End: 2024-05-24
Payer: COMMERCIAL

## 2024-05-24 VITALS
BODY MASS INDEX: 27.86 KG/M2 | OXYGEN SATURATION: 98 % | HEIGHT: 71 IN | SYSTOLIC BLOOD PRESSURE: 112 MMHG | TEMPERATURE: 97 F | DIASTOLIC BLOOD PRESSURE: 72 MMHG | HEART RATE: 53 BPM | WEIGHT: 199 LBS

## 2024-05-24 DIAGNOSIS — G47.09 OTHER INSOMNIA: Primary | ICD-10-CM

## 2024-05-24 PROCEDURE — 99213 OFFICE O/P EST LOW 20 MIN: CPT | Performed by: FAMILY MEDICINE

## 2024-05-24 RX ORDER — RESPIRATORY SYNCYTIAL VIRUS VACCINE 120MCG/0.5
0.5 KIT INTRAMUSCULAR ONCE
Qty: 1 EACH | Refills: 0 | Status: CANCELLED | OUTPATIENT
Start: 2024-05-24 | End: 2024-05-24

## 2024-05-24 ASSESSMENT — ANXIETY QUESTIONNAIRES
2. NOT BEING ABLE TO STOP OR CONTROL WORRYING: SEVERAL DAYS
IF YOU CHECKED OFF ANY PROBLEMS ON THIS QUESTIONNAIRE, HOW DIFFICULT HAVE THESE PROBLEMS MADE IT FOR YOU TO DO YOUR WORK, TAKE CARE OF THINGS AT HOME, OR GET ALONG WITH OTHER PEOPLE: SOMEWHAT DIFFICULT
5. BEING SO RESTLESS THAT IT IS HARD TO SIT STILL: MORE THAN HALF THE DAYS
4. TROUBLE RELAXING: MORE THAN HALF THE DAYS
3. WORRYING TOO MUCH ABOUT DIFFERENT THINGS: SEVERAL DAYS
6. BECOMING EASILY ANNOYED OR IRRITABLE: SEVERAL DAYS
8. IF YOU CHECKED OFF ANY PROBLEMS, HOW DIFFICULT HAVE THESE MADE IT FOR YOU TO DO YOUR WORK, TAKE CARE OF THINGS AT HOME, OR GET ALONG WITH OTHER PEOPLE?: SOMEWHAT DIFFICULT
7. FEELING AFRAID AS IF SOMETHING AWFUL MIGHT HAPPEN: SEVERAL DAYS
1. FEELING NERVOUS, ANXIOUS, OR ON EDGE: MORE THAN HALF THE DAYS
GAD7 TOTAL SCORE: 10
7. FEELING AFRAID AS IF SOMETHING AWFUL MIGHT HAPPEN: SEVERAL DAYS
GAD7 TOTAL SCORE: 10

## 2024-05-24 NOTE — PROGRESS NOTES
"  Assessment & Plan     Other insomnia  Although Leonides seems to understate the impact that having his son and family with a child and 8 cats in the house, this seems to be an obvious disruption to his daily life that likely is giving some angst that is playing out with this sleep issue.  He agreed that this may be playing a role.  He mentioned he has tried melatonin in the past without much success.  Since he is doing well with benadryl 25mg at HS, I suggested he can continue this and if sleeps well for a couple of weeks, could see how things go for a night or two without it. If this becomes no longer effective, recommended he contact me for considertion of other options.  Although there is not strong evidence that sleep hygiene measures truly impact sleep issues, we did review and encourage these to promote a better sleep pattern (no TV/reading in bed, avoid late in day exercise, late heavy intake of food, etc).            BMI  Estimated body mass index is 27.75 kg/m  as calculated from the following:    Height as of this encounter: 1.803 m (5' 11\").    Weight as of this encounter: 90.3 kg (199 lb).       Electronically signed by Greg Schoen, MD          Subjective   Leonides is a 64 year old, presenting for the following health issues:  sleeping concerns        5/24/2024     1:21 PM   Additional Questions   Roomed by Maye VERNON     History of Present Illness       Mental Health Follow-up:  Patient presents to follow-up on Anxiety.    Patient's anxiety since last visit has been:  Worse  The patient is not having other symptoms associated with anxiety.  Any significant life events: other  Patient is feeling anxious or having panic attacks.  Patient has no concerns about alcohol or drug use.    Reason for visit:  Sleeping concerns and is thinking anxiety is the cause  Symptom onset:  More than a month  Symptoms include:  Not sleeping well, hard to get to sleep, hopeless feeling right before fall alseep.  What makes it " better:  Benadryl is helping, will this cause issues in the long run    He eats 2-3 servings of fruits and vegetables daily.He consumes 0 sweetened beverage(s) daily.He exercises with enough effort to increase his heart rate 30 to 60 minutes per day.  He exercises with enough effort to increase his heart rate 5 days per week.   He is taking medications regularly.         Anxiety   How are you doing with your anxiety since your last visit? No change  Are you having other symptoms that might be associated with anxiety? Yes:  not sleeping well is the only concern at this time  Have you had a significant life event? No   Are you feeling depressed? No  Do you have any concerns with your use of alcohol or other drugs? No    Leonides has been having trouble sleeping the past month or so. Although he does not see that there are any new stressors in his life, when he lays down in bed and seems to be nearly asleep he will have a sense of anxiety that makes him alert again and then can't fall asleep.  However, his son and daughter-in-law with one baby and 8 cats are living with them and that may be part of it. Once he finally falls asleep he sleeps well the rest of the night, so this is an issue of falling asleep.  He has tried taking 25mg benadryl this past week and it has helped each time he has taken it 30-60 minutes before planning to go to sleep.  No AM side effects have been noted wit this.   Social History     Tobacco Use    Smoking status: Former     Current packs/day: 2.00     Average packs/day: 2.0 packs/day for 12.0 years (24.0 ttl pk-yrs)     Types: Cigarettes    Smokeless tobacco: Never    Tobacco comments:     Quit at age 25   Vaping Use    Vaping status: Never Used   Substance Use Topics    Alcohol use: Yes     Comment: beer weekly    Drug use: No         3/1/2018     3:40 PM 3/1/2018     3:41 PM 5/24/2024     1:08 PM   SHINE-7 SCORE   Total Score 0 (minimal anxiety)  10 (moderate anxiety)   Total Score  0 10          "3/1/2018     3:39 PM   PHQ   PHQ-9 Total Score 1   Q9: Thoughts of better off dead/self-harm past 2 weeks Not at all         3/1/2018     3:39 PM   Last PHQ-9   1.  Little interest or pleasure in doing things 0   2.  Feeling down, depressed, or hopeless 0   3.  Trouble falling or staying asleep, or sleeping too much 1   4.  Feeling tired or having little energy 0   5.  Poor appetite or overeating 0   6.  Feeling bad about yourself 0   7.  Trouble concentrating 0   8.  Moving slowly or restless 0   Q9: Thoughts of better off dead/self-harm past 2 weeks 0   PHQ-9 Total Score 1         5/24/2024     1:08 PM   SHINE-7    1. Feeling nervous, anxious, or on edge 2   2. Not being able to stop or control worrying 1   3. Worrying too much about different things 1   4. Trouble relaxing 2   5. Being so restless that it is hard to sit still 2   6. Becoming easily annoyed or irritable 1   7. Feeling afraid, as if something awful might happen 1   SHINE-7 Total Score 10   If you checked any problems, how difficult have they made it for you to do your work, take care of things at home, or get along with other people? Somewhat difficult             Review of Systems  CONSTITUTIONAL: NEGATIVE for fever, chills, change in weight  ENT/MOUTH: NEGATIVE for ear, mouth and throat problems  RESP: NEGATIVE for significant cough or SOB  CV: NEGATIVE for chest pain, palpitations or peripheral edema    He just had his annual check up with cardiology and there are no changes to his meds or plan of care.        Objective    /72   Pulse 53   Temp 97  F (36.1  C) (Temporal)   Ht 1.803 m (5' 11\")   Wt 90.3 kg (199 lb)   SpO2 98%   BMI 27.75 kg/m    Body mass index is 27.75 kg/m .  Physical Exam   Alert and oriented, in no acute distress.  Chest is clear to auscultation.  Heart is regular without murmurs, clicks or rubs.   MENTAL STATUS EXAM  Appearance: appropriate  Attitude: cooperative  Behavior: normal  Eye Contact: normal  Speech: " normal  Orientation: oriented to person , place, time   Mood:  normal  Affect: normal  Thought Process: clear                Signed Electronically by: Gregory G. Schoen, MD

## 2024-09-03 ENCOUNTER — PATIENT OUTREACH (OUTPATIENT)
Dept: CARE COORDINATION | Facility: CLINIC | Age: 64
End: 2024-09-03
Payer: COMMERCIAL

## 2024-09-09 ENCOUNTER — TELEPHONE (OUTPATIENT)
Dept: FAMILY MEDICINE | Facility: CLINIC | Age: 64
End: 2024-09-09
Payer: COMMERCIAL

## 2024-09-09 NOTE — TELEPHONE ENCOUNTER
Reason for Call:  Appointment Request    Patient requesting this type of appt:  Annual Physical     Requested provider: Schoen, Gregory G    Reason patient unable to be scheduled: Not within requested timeframe    When does patient want to be seen/preferred time:  October or November     Comments: [facility]    Could we send this information to you in [facility] or would you prefer to receive a phone call?:   Patient would like to be contacted via [employer] or a phone call to mobile phone okay to leave a detailed message on mobile phone.     Call taken on 9/9/2024 at 9:08 AM by Gabriela Kelly

## 2024-10-01 SDOH — HEALTH STABILITY: PHYSICAL HEALTH: ON AVERAGE, HOW MANY DAYS PER WEEK DO YOU ENGAGE IN MODERATE TO STRENUOUS EXERCISE (LIKE A BRISK WALK)?: 3 DAYS

## 2024-10-01 SDOH — HEALTH STABILITY: PHYSICAL HEALTH: ON AVERAGE, HOW MANY MINUTES DO YOU ENGAGE IN EXERCISE AT THIS LEVEL?: 60 MIN

## 2024-10-01 ASSESSMENT — SOCIAL DETERMINANTS OF HEALTH (SDOH): HOW OFTEN DO YOU GET TOGETHER WITH FRIENDS OR RELATIVES?: MORE THAN THREE TIMES A WEEK

## 2024-10-04 ENCOUNTER — OFFICE VISIT (OUTPATIENT)
Dept: FAMILY MEDICINE | Facility: CLINIC | Age: 64
End: 2024-10-04
Payer: COMMERCIAL

## 2024-10-04 VITALS
DIASTOLIC BLOOD PRESSURE: 82 MMHG | OXYGEN SATURATION: 97 % | HEART RATE: 51 BPM | TEMPERATURE: 97.6 F | BODY MASS INDEX: 26.72 KG/M2 | WEIGHT: 197.25 LBS | HEIGHT: 72 IN | RESPIRATION RATE: 18 BRPM | SYSTOLIC BLOOD PRESSURE: 136 MMHG

## 2024-10-04 DIAGNOSIS — I25.118 CORONARY ARTERY DISEASE OF NATIVE ARTERY OF NATIVE HEART WITH STABLE ANGINA PECTORIS (H): ICD-10-CM

## 2024-10-04 DIAGNOSIS — Z00.00 WELLNESS EXAMINATION: Primary | ICD-10-CM

## 2024-10-04 DIAGNOSIS — E78.5 HYPERLIPIDEMIA WITH TARGET LDL LESS THAN 100: ICD-10-CM

## 2024-10-04 DIAGNOSIS — N52.2 DRUG-INDUCED ERECTILE DYSFUNCTION: ICD-10-CM

## 2024-10-04 DIAGNOSIS — L40.8 OTHER PSORIASIS: ICD-10-CM

## 2024-10-04 PROCEDURE — 90662 IIV NO PRSV INCREASED AG IM: CPT | Performed by: FAMILY MEDICINE

## 2024-10-04 PROCEDURE — 91320 SARSCV2 VAC 30MCG TRS-SUC IM: CPT | Performed by: FAMILY MEDICINE

## 2024-10-04 PROCEDURE — 90480 ADMN SARSCOV2 VAC 1/ONLY CMP: CPT | Performed by: FAMILY MEDICINE

## 2024-10-04 PROCEDURE — G0438 PPPS, INITIAL VISIT: HCPCS | Performed by: FAMILY MEDICINE

## 2024-10-04 PROCEDURE — G0008 ADMIN INFLUENZA VIRUS VAC: HCPCS | Performed by: FAMILY MEDICINE

## 2024-10-04 ASSESSMENT — PAIN SCALES - GENERAL: PAINLEVEL: NO PAIN (0)

## 2024-10-04 NOTE — PROGRESS NOTES
Preventive Care Visit  [facility]  Gregory G. Schoen, MD, Family Medicine  Oct 4, 2024      Assessment & Plan     Wellness examination  No acute issues to address.  Up to date on vaccines; given covid and flu today    Coronary artery disease of native artery of native heart with stable angina pectoris (H)  Has a long history of some exercise induced mild angina for which he simply rests and it resolves without use of ntg.  No change in plan of care.    Hyperlipidemia with target LDL less than 100  On statin and zetia with good results.  Lipid profile at target. No changes.     Other psoriasis  On biologic therapy with good results.  Will continue to follow with derm for this.     Drug-induced erectile dysfunction  On cialis; no change.         Discussed AAA screening and opted to not proceed.       Counseling  Appropriate preventive services were addressed with this patient via screening, questionnaire, or discussion as appropriate for fall prevention, nutrition, physical activity, Tobacco-use cessation, social engagement, weight loss and cognition.  Checklist reviewing preventive services available has been given to the patient.  Reviewed patient's diet, addressing concerns and/or questions.   He is at risk for lack of exercise and has been provided with information to increase physical activity for the benefit of his well-being.       FUTURE APPOINTMENTS:       - Follow-up for annual visit or as needed    Electronically signed by Greg Schoen, MD          Chary Coyle is a 64 year old, presenting for the following:  Wellness Visit        10/4/2024     1:46 PM   Additional Questions   Roomed by Sonia         [facility] Care Directive  Patient does not have a Health Care Directive or Living Will: Patient states has Advance Directive and will bring in a copy to clinic.    HPI    Overall is doing well. Picked up COVID at [facility] in August and took a couple of months for the  cough to finally subside.      Diagnosed with basal cell on his scalp and has sun exposure concerns.      Heart has been fine. Still gets a little exertionally induced angina.  Never takes ntg but just slows down and he is fine, which has been the case for 24 years.  Taking meds without side effects.  Saw Dr. Hernandez in May and doing fine.     Discussed PSA screening and with full conversation, would prefer not to test.      Notes a tremor in the AM when he first wakes.     Current Outpatient Medications   Medication Sig Dispense Refill    ACE/ARB NOT PRESCRIBED, INTENTIONAL, by Other route continuous prn      amLODIPine (NORVASC) 5 MG tablet Take 1 tablet (5 mg) by mouth daily 90 tablet 4    atorvastatin (LIPITOR) 80 MG tablet Take 1 tablet (80 mg) by mouth daily 90 tablet 4    clopidogrel (PLAVIX) 75 MG tablet Take 1 tablet (75 mg) by mouth daily 90 tablet 4    ezetimibe (ZETIA) 10 MG tablet Take 1 tablet (10 mg) by mouth daily 90 tablet 4    guselkumab (TREMFYA) 100 MG/ML SOSY Inject 100 mg Subcutaneous once      metoprolol succinate ER (TOPROL XL) 50 MG 24 hr tablet Take 1 tablet (50 mg) by mouth daily 90 tablet 4    tadalafil (CIALIS) 20 MG tablet TAKE 1 TABLET DAILY AS     NEEDED Strength: 20 mg 90 tablet 0                     10/1/2024   General Health   How would you rate your overall physical health? Good   Feel stress (tense, anxious, or unable to sleep) To some extent      (!) STRESS CONCERN      10/1/2024   Nutrition   Diet: Regular (no restrictions)            10/1/2024   Exercise   Days per week of moderate/strenous exercise 3 days   Average minutes spent exercising at this level 60 min            10/1/2024   Social Factors   Frequency of gathering with friends or relatives More than three times a week   Worry food won't last until get money to buy more No   Food not last or not have enough money for food? No   Do you have housing? (Housing is defined as stable permanent housing and does not include  staying ouside in a car, in a tent, in an abandoned building, in an overnight shelter, or couch-surfing.) Yes   Are you worried about losing your housing? No   Lack of transportation? No   Unable to get utilities (heat,electricity)? No            10/1/2024   Fall Risk   Fallen 2 or more times in the past year? No    No   Trouble with walking or balance? No    No       Multiple values from one day are sorted in reverse-chronological order          10/1/2024   Activities of Daily Living- Home Safety   Needs help with the following daily activites None of the above   Safety concerns in the home None of the above            10/1/2024   Dental   Dentist two times every year? Yes            10/1/2024   Hearing Screening   Hearing concerns? None of the above            10/1/2024   Driving Risk Screening   Patient/family members have concerns about driving No            10/1/2024   General Alertness/Fatigue Screening   Have you been more tired than usual lately? No            10/1/2024   Urinary Incontinence Screening   Bothered by leaking urine in past 6 months No               Today's PHQ-2 Score:       10/3/2024     5:02 PM   PHQ-2 ( 1999 Pf[employer])   Q1: Little interest or pleasure in doing things 0   Q2: Feeling down, depressed or hopeless 0   PHQ-2 Score 0   Q1: Little interest or pleasure in doing things Not at all   Q2: Feeling down, depressed or hopeless Not at all   PHQ-2 Score 0           10/1/2024   Substance Use   Alcohol more than 3/day or more than 7/wk No   Do you have a current opioid prescription? No   How severe/bad is pain from 1 to 10? 0/10 (No Pain)   Do you use any other substances recreationally? No        Social History     Tobacco Use    Smoking status: Former     Current packs/day: 0.00     Average packs/day: 2.0 packs/day for 24.0 years (48.0 ttl pk-yrs)     Types: Cigarettes     Start date: 1975     Quit date: 1987     Years since quittin.1    Smokeless tobacco: Never    Tobacco  "comments:     none since 1987   Vaping Use    Vaping status: Never Used   Substance Use Topics    Alcohol use: Yes     Comment: 2 drinks a week    Drug use: No       Last PSA: No results found for: \"PSA\"  ASCVD Risk   The ASCVD Risk score (Jeramy VÁZQUEZ, et al., 2019) failed to calculate for the following reasons:    The valid total cholesterol range is 130 to 320 mg/dL            Reviewed and updated as needed this visit by Provider                      Current providers sharing in care for this patient include:  Patient Care Team:  Schoen, Gregory G, MD as PCP - General  Schoen, Gregory G, MD as Assigned PCP  Tay Hernandez MD as Assigned Heart and Vascular Provider    The following health maintenance items are reviewed in Epic and correct as of today:  Health Maintenance   Topic Date Due    AORTIC ANEURYSM SCREENING (SYSTEM ASSIGNED)  Never done    LUNG CANCER SCREENING  10/28/2023    INFLUENZA VACCINE (1) 09/01/2024    COVID-19 Vaccine (6 - 2024-25 season) 09/01/2024    MICROALBUMIN  10/02/2024    ANNUAL REVIEW OF HM ORDERS  10/02/2024    MEDICARE ANNUAL WELLNESS VISIT  10/02/2024    BMP  05/09/2025    LIPID  05/09/2025    CREATININE  05/09/2025    FALL RISK ASSESSMENT  10/04/2025    GLUCOSE  05/09/2027    ADVANCE CARE PLANNING  10/05/2028    DTAP/TDAP/TD IMMUNIZATION (3 - Td or Tdap) 11/12/2031    COLORECTAL CANCER SCREENING  12/15/2031    HEPATITIS C SCREENING  Completed    PHQ-2 (once per calendar year)  Completed    Pneumococcal Vaccine: 65+ Years  Completed    ZOSTER IMMUNIZATION  Completed    RSV VACCINE  Completed    HPV IMMUNIZATION  Aged Out    MENINGITIS IMMUNIZATION  Aged Out    RSV MONOCLONAL ANTIBODY  Aged Out         Review of Systems  Constitutional, HEENT, cardiovascular, pulmonary, GI, , musculoskeletal, neuro, skin, endocrine and psych systems are negative, except as otherwise noted.     Objective    Exam  /82   Pulse 51   Temp 97.6  F (36.4  C) (Temporal)   Resp 18   Ht " "1.825 m (5' 11.85\")   Wt 89.5 kg (197 lb 4 oz)   SpO2 97%   BMI 26.86 kg/m     Estimated body mass index is 26.86 kg/m  as calculated from the following:    Height as of this encounter: 1.825 m (5' 11.85\").    Weight as of this encounter: 89.5 kg (197 lb 4 oz).    Physical Exam  GENERAL: alert and no distress  EYES: Eyes grossly normal to inspection, PERRL and conjunctivae and sclerae normal  HENT: ear canals and TM's normal (some increased wax on the right and noted he would manage that himself);  nose and mouth without ulcers or lesions  NECK: no adenopathy, no asymmetry, masses, or scars  RESP: lungs clear to auscultation - no rales, rhonchi or wheezes  CV: regular rate and rhythm, normal S1 S2, no S3 or S4, no murmur, click or rub, no peripheral edema  ABDOMEN: soft, nontender, no hepatosplenomegaly, no masses and bowel sounds normal  MS: no gross musculoskeletal defects noted, no edema  SKIN: no suspicious lesions or rashes  NEURO: Normal strength and tone, mentation intact and speech normal  PSYCH: mentation appears normal, affect normal/bright        10/4/2024   Mini Cog   Clock Draw Score 2 Normal   3 Item Recall 0 objects recalled   Mini Cog Total Score 2               Signed Electronically by: Gregory G. Schoen, MD    "

## 2024-10-17 ENCOUNTER — MYC MEDICAL ADVICE (OUTPATIENT)
Dept: FAMILY MEDICINE | Facility: CLINIC | Age: 64
End: 2024-10-17
Payer: COMMERCIAL

## 2024-10-17 DIAGNOSIS — N52.2 DRUG-INDUCED ERECTILE DYSFUNCTION: ICD-10-CM

## 2024-10-17 RX ORDER — TADALAFIL 20 MG/1
TABLET ORAL
Qty: 90 TABLET | Refills: 2 | Status: SHIPPED | OUTPATIENT
Start: 2024-10-17

## 2025-04-21 DIAGNOSIS — I25.10 CORONARY ARTERY DISEASE INVOLVING NATIVE CORONARY ARTERY OF NATIVE HEART WITHOUT ANGINA PECTORIS: ICD-10-CM

## 2025-04-21 RX ORDER — CLOPIDOGREL BISULFATE 75 MG/1
75 TABLET ORAL DAILY
Qty: 90 TABLET | Refills: 0 | Status: SHIPPED | OUTPATIENT
Start: 2025-04-21

## 2025-04-22 DIAGNOSIS — I25.10 CORONARY ARTERY DISEASE INVOLVING NATIVE CORONARY ARTERY OF NATIVE HEART WITHOUT ANGINA PECTORIS: ICD-10-CM

## 2025-04-22 RX ORDER — METOPROLOL SUCCINATE 50 MG/1
50 TABLET, EXTENDED RELEASE ORAL DAILY
Qty: 90 TABLET | Refills: 0 | Status: SHIPPED | OUTPATIENT
Start: 2025-04-22

## 2025-04-23 DIAGNOSIS — E78.5 HYPERLIPIDEMIA LDL GOAL <70: ICD-10-CM

## 2025-04-23 DIAGNOSIS — I10 BENIGN ESSENTIAL HYPERTENSION: ICD-10-CM

## 2025-04-23 RX ORDER — AMLODIPINE BESYLATE 5 MG/1
5 TABLET ORAL DAILY
Qty: 90 TABLET | Refills: 0 | Status: SHIPPED | OUTPATIENT
Start: 2025-04-23

## 2025-04-23 RX ORDER — EZETIMIBE 10 MG/1
10 TABLET ORAL DAILY
Qty: 90 TABLET | Refills: 0 | Status: SHIPPED | OUTPATIENT
Start: 2025-04-23

## 2025-05-12 ENCOUNTER — MYC MEDICAL ADVICE (OUTPATIENT)
Dept: CARDIOLOGY | Facility: CLINIC | Age: 65
End: 2025-05-12
Payer: COMMERCIAL

## 2025-05-12 DIAGNOSIS — I10 BENIGN ESSENTIAL HYPERTENSION: ICD-10-CM

## 2025-05-12 DIAGNOSIS — I25.10 CORONARY ARTERY DISEASE INVOLVING NATIVE CORONARY ARTERY OF NATIVE HEART WITHOUT ANGINA PECTORIS: ICD-10-CM

## 2025-05-12 DIAGNOSIS — E78.5 HYPERLIPIDEMIA LDL GOAL <70: ICD-10-CM

## 2025-05-12 RX ORDER — EZETIMIBE 10 MG/1
10 TABLET ORAL DAILY
Qty: 90 TABLET | Refills: 0 | Status: SHIPPED | OUTPATIENT
Start: 2025-05-12

## 2025-05-12 RX ORDER — ATORVASTATIN CALCIUM 80 MG/1
80 TABLET, FILM COATED ORAL DAILY
Qty: 90 TABLET | Refills: 4 | Status: SHIPPED | OUTPATIENT
Start: 2025-05-12

## 2025-05-12 RX ORDER — METOPROLOL SUCCINATE 50 MG/1
50 TABLET, EXTENDED RELEASE ORAL DAILY
Qty: 90 TABLET | Refills: 0 | Status: SHIPPED | OUTPATIENT
Start: 2025-05-12

## 2025-05-12 RX ORDER — AMLODIPINE BESYLATE 5 MG/1
5 TABLET ORAL DAILY
Qty: 90 TABLET | Refills: 0 | Status: SHIPPED | OUTPATIENT
Start: 2025-05-12

## 2025-05-12 RX ORDER — CLOPIDOGREL BISULFATE 75 MG/1
75 TABLET ORAL DAILY
Qty: 90 TABLET | Refills: 0 | Status: SHIPPED | OUTPATIENT
Start: 2025-05-12

## 2025-06-03 ENCOUNTER — RESULTS FOLLOW-UP (OUTPATIENT)
Dept: CARDIOLOGY | Facility: CLINIC | Age: 65
End: 2025-06-03

## 2025-06-03 ENCOUNTER — LAB (OUTPATIENT)
Dept: LAB | Facility: CLINIC | Age: 65
End: 2025-06-03
Payer: COMMERCIAL

## 2025-06-03 DIAGNOSIS — I25.10 CAD (CORONARY ARTERY DISEASE): ICD-10-CM

## 2025-06-03 DIAGNOSIS — Z13.6 SCREENING FOR CARDIOVASCULAR CONDITION: ICD-10-CM

## 2025-06-03 DIAGNOSIS — I10 BENIGN ESSENTIAL HYPERTENSION: Primary | ICD-10-CM

## 2025-06-03 LAB
ANION GAP SERPL CALCULATED.3IONS-SCNC: 10 MMOL/L (ref 7–15)
BUN SERPL-MCNC: 18 MG/DL (ref 8–23)
CALCIUM SERPL-MCNC: 9.2 MG/DL (ref 8.8–10.4)
CHLORIDE SERPL-SCNC: 104 MMOL/L (ref 98–107)
CHOLEST SERPL-MCNC: 102 MG/DL
CREAT SERPL-MCNC: 0.85 MG/DL (ref 0.67–1.17)
CREAT UR-MCNC: 124.1 MG/DL
EGFRCR SERPLBLD CKD-EPI 2021: >90 ML/MIN/1.73M2
FASTING STATUS PATIENT QL REPORTED: YES
FASTING STATUS PATIENT QL REPORTED: YES
GLUCOSE SERPL-MCNC: 93 MG/DL (ref 70–99)
HCO3 SERPL-SCNC: 27 MMOL/L (ref 22–29)
HDLC SERPL-MCNC: 41 MG/DL
LDLC SERPL CALC-MCNC: 45 MG/DL
MICROALBUMIN UR-MCNC: 16.6 MG/L
MICROALBUMIN/CREAT UR: 13.38 MG/G CR (ref 0–17)
NONHDLC SERPL-MCNC: 61 MG/DL
POTASSIUM SERPL-SCNC: 4.2 MMOL/L (ref 3.4–5.3)
SODIUM SERPL-SCNC: 141 MMOL/L (ref 135–145)
TRIGL SERPL-MCNC: 79 MG/DL

## 2025-06-03 PROCEDURE — 82043 UR ALBUMIN QUANTITATIVE: CPT

## 2025-06-03 PROCEDURE — 82570 ASSAY OF URINE CREATININE: CPT

## 2025-06-03 PROCEDURE — 36415 COLL VENOUS BLD VENIPUNCTURE: CPT

## 2025-06-03 PROCEDURE — 3048F LDL-C <100 MG/DL: CPT

## 2025-06-03 PROCEDURE — 80048 BASIC METABOLIC PNL TOTAL CA: CPT

## 2025-06-03 PROCEDURE — 80061 LIPID PANEL: CPT

## 2025-06-25 ENCOUNTER — OFFICE VISIT (OUTPATIENT)
Dept: CARDIOLOGY | Facility: CLINIC | Age: 65
End: 2025-06-25
Payer: COMMERCIAL

## 2025-06-25 ENCOUNTER — LAB (OUTPATIENT)
Dept: LAB | Facility: CLINIC | Age: 65
End: 2025-06-25
Payer: COMMERCIAL

## 2025-06-25 VITALS
BODY MASS INDEX: 26.41 KG/M2 | WEIGHT: 195 LBS | HEART RATE: 52 BPM | SYSTOLIC BLOOD PRESSURE: 132 MMHG | OXYGEN SATURATION: 98 % | DIASTOLIC BLOOD PRESSURE: 84 MMHG | HEIGHT: 72 IN

## 2025-06-25 DIAGNOSIS — I10 BENIGN ESSENTIAL HYPERTENSION: ICD-10-CM

## 2025-06-25 DIAGNOSIS — I25.10 CORONARY ARTERY DISEASE INVOLVING NATIVE CORONARY ARTERY OF NATIVE HEART WITHOUT ANGINA PECTORIS: ICD-10-CM

## 2025-06-25 DIAGNOSIS — E78.5 HYPERLIPIDEMIA LDL GOAL <70: ICD-10-CM

## 2025-06-25 DIAGNOSIS — Z79.899 ENCOUNTER FOR LONG-TERM (CURRENT) USE OF MEDICATIONS: ICD-10-CM

## 2025-06-25 PROCEDURE — 36415 COLL VENOUS BLD VENIPUNCTURE: CPT

## 2025-06-25 RX ORDER — ATORVASTATIN CALCIUM 80 MG/1
80 TABLET, FILM COATED ORAL DAILY
Qty: 90 TABLET | Refills: 4 | Status: SHIPPED | OUTPATIENT
Start: 2025-06-25

## 2025-06-25 RX ORDER — METOPROLOL SUCCINATE 50 MG/1
50 TABLET, EXTENDED RELEASE ORAL DAILY
Qty: 90 TABLET | Refills: 3 | Status: SHIPPED | OUTPATIENT
Start: 2025-06-25

## 2025-06-25 RX ORDER — CLOPIDOGREL BISULFATE 75 MG/1
75 TABLET ORAL DAILY
Qty: 90 TABLET | Refills: 3 | Status: SHIPPED | OUTPATIENT
Start: 2025-06-25

## 2025-06-25 RX ORDER — AMLODIPINE BESYLATE 5 MG/1
5 TABLET ORAL DAILY
Qty: 90 TABLET | Refills: 3 | Status: SHIPPED | OUTPATIENT
Start: 2025-06-25

## 2025-06-25 RX ORDER — EZETIMIBE 10 MG/1
10 TABLET ORAL DAILY
Qty: 90 TABLET | Refills: 3 | Status: SHIPPED | OUTPATIENT
Start: 2025-06-25

## 2025-06-25 ASSESSMENT — PAIN SCALES - GENERAL: PAINLEVEL_OUTOF10: NO PAIN (0)

## 2025-06-25 NOTE — PROGRESS NOTES
General Cardiology Clinic Progress Note  Leonides Paul MRN# [id number]   YOB: 1960 Age: 65 year old       Reason for visit: Coronary artery disease    History of presenting illness:       I had the opportunity to see Leonides Paul at [facility] today for coronary artery disease and chronic stable angina.  His mid LAD is chronically occluded and supplied by collaterals which we have been managing medically for many years.  He continues to play golf and goes for regular daily walks, sometimes jogging for short distances.  Only when he exerts himself more strenuously, with running or walking fast uphill, does he experience some mild chest discomfort.  This is a very stable pattern of chest discomfort and he knows his limits.  He has also noticed a little bit of back discomfort at those times as well.  If he slows down or stops, the distal chest discomfort resolves immediately.     He has no other symptoms such as shortness of breath palpitations lightheadedness or syncope.     His cholesterol numbers this year are excellent with an LDL of 45 and HDL 41.  His basic metabolic panel is normal.  ALT is normal as well.    On examination today his blood pressure is 132/84, heart rate 52, and weight is stable at 195 pounds.  His lungs are clear.  Heart rhythm is regular.  He has no cardiac murmurs or carotid bruits                 Assessment and Plan:     ASSESSMENT:    Mr. Leonides Paul here is a 65-year-old gentleman with a history of coronary artery disease including a chronically occluded LAD after several stenting procedures.  He has collateral blood flow into the LAD territory and has had normal left ventricular function.  We have been managing this chronic ischemic coronary disease situation with medical therapy for a number of years and he has stable angina only with higher levels of exertion.  He has had no change in symptoms this year.    His cardiac risk factors are under excellent  control, including hypertension and dyslipidemia.    I will have him follow-up with me again next year, repeat his laboratory studies, and review his symptoms.  I have urged him to contact me if he has any change in his symptoms prior to that visit in which case I would evaluate his coronary disease situation with repeat coronary angiography.    Tay Hernandez MD         Orders this Visit:  Orders Placed This Encounter   Procedures    Basic metabolic panel    Lipid Profile    ALT    Follow-Up with Cardiology     Orders Placed This Encounter   Medications    amLODIPine (NORVASC) 5 MG tablet     Sig: Take 1 tablet (5 mg) by mouth daily.     Dispense:  90 tablet     Refill:  3    atorvastatin (LIPITOR) 80 MG tablet     Sig: Take 1 tablet (80 mg) by mouth daily.     Dispense:  90 tablet     Refill:  4    clopidogrel (PLAVIX) 75 MG tablet     Sig: Take 1 tablet (75 mg) by mouth daily.     Dispense:  90 tablet     Refill:  3    ezetimibe (ZETIA) 10 MG tablet     Sig: Take 1 tablet (10 mg) by mouth daily.     Dispense:  90 tablet     Refill:  3    metoprolol succinate ER (TOPROL XL) 50 MG 24 hr tablet     Sig: Take 1 tablet (50 mg) by mouth daily.     Dispense:  90 tablet     Refill:  3     Medications Discontinued During This Encounter   Medication Reason    metoprolol succinate ER (TOPROL XL) 50 MG 24 hr tablet Reorder (No AVS)    ezetimibe (ZETIA) 10 MG tablet Reorder (No AVS)    clopidogrel (PLAVIX) 75 MG tablet Reorder (No AVS)    atorvastatin (LIPITOR) 80 MG tablet Reorder (No AVS)    amLODIPine (NORVASC) 5 MG tablet Reorder (No AVS)       Today's clinic visit entailed:    35 minutes spent by me on the date of the encounter doing chart review, history and exam, documentation and further activities per the note  Provider  Link to [facility] Help Grid     The level of medical decision making during this visit was of high complexity.           Review of Systems:     Review of Systems:  Skin:        Eyes:       ENT:      "  Respiratory:  Negative for shortness of breath, dyspnea on exertion, cough, wheezing  Cardiovascular:  Negative for, palpitations, chest pain, edema, fatigue, lightheadedness, dizziness    Gastroenterology:      Genitourinary:       Musculoskeletal:       Neurologic:  Negative for headaches, numbness or tingling of hands, numbness or tingling of feet  Psychiatric:       Heme/Lymph/Imm:       Endocrine:                 Physical Exam:     Vitals: /84 (BP Location: Right arm, Patient Position: Sitting, Cuff Size: Adult Regular)   Pulse 52   Ht 1.824 m (5' 11.8\")   Wt 88.5 kg (195 lb)   SpO2 98%   BMI 26.59 kg/m    Constitutional: Well nourished and in no apparent distress.  Eyes: Pupils equal, round. Sclerae anicteric.   HEENT: Normocephalic, atraumatic.   Neck: Supple. JVD   Respiratory: Breathing non-labored. Lungs clear to auscultation bilaterally. No crackles, wheezes, rhonchi, or rales.  Cardiovascular:  Regular rate and rhythm, normal S1 and S2. No murmur, rub, or gallop.  Skin: Warm, dry. No rashes, cyanosis, or xanthelasma.  Extremities: No edema.  Neurologic: No gross motor deficits. Alert, awake, and oriented to person, place and time.  Psychiatric: Affect appropriate.             Medications:     Current Outpatient Medications   Medication Sig Dispense Refill    ACE/ARB NOT PRESCRIBED, INTENTIONAL, by Other route continuous prn      amLODIPine (NORVASC) 5 MG tablet Take 1 tablet (5 mg) by mouth daily. 90 tablet 3    atorvastatin (LIPITOR) 80 MG tablet Take 1 tablet (80 mg) by mouth daily. 90 tablet 4    clopidogrel (PLAVIX) 75 MG tablet Take 1 tablet (75 mg) by mouth daily. 90 tablet 3    ezetimibe (ZETIA) 10 MG tablet Take 1 tablet (10 mg) by mouth daily. 90 tablet 3    guselkumab (TREMFYA) 100 MG/ML SOSY Inject 100 mg Subcutaneous once      metoprolol succinate ER (TOPROL XL) 50 MG 24 hr tablet Take 1 tablet (50 mg) by mouth daily. 90 tablet 3    tadalafil (CIALIS) 20 MG tablet TAKE 1 TABLET " DAILY AS     NEEDED Strength: 20 mg 90 tablet 2       Family History   Problem Relation Age of Onset    Allergies Daughter         environment al    Allergies Son         same as sister    Alzheimer Disease Father          at age 73    Blood Disease Father     Cancer Maternal Grandfather         [place] lung -    Heart Disease Maternal Grandmother     Psychotic Disorder Brother        Social History     Socioeconomic History    Marital status:      Spouse name: Not on file    Number of children: Not on file    Years of education: Not on file    Highest education level: Not on file   Occupational History    Not on file   Tobacco Use    Smoking status: Former     Current packs/day: 0.00     Average packs/day: 2.0 packs/day for 24.0 years (48.0 ttl pk-yrs)     Types: Cigarettes     Start date: 1975     Quit date: 1987     Years since quittin.8    Smokeless tobacco: Never    Tobacco comments:     none since    Vaping Use    Vaping status: Never Used   Substance and Sexual Activity    Alcohol use: Yes     Comment: 2 drinks a week    Drug use: No    Sexual activity: Yes     Partners: Female     Birth control/protection: Male Surgical, Female Surgical   Other Topics Concern    Parent/sibling w/ CABG, MI or angioplasty before 65F 55M? No   Social History Narrative    Not on file     Social Drivers of Health     Financial Resource Strain: Low Risk  (10/1/2024)    Financial Resource Strain     Within the past 12 months, have you or your family members you live with been unable to get utilities (heat, electricity) when it was really needed?: No   Food Insecurity: Low Risk  (10/1/2024)    Food Insecurity     Within the past 12 months, did you worry that your food would run out before you got money to buy more?: No     Within the past 12 months, did the food you bought just not last and you didn t have money to get more?: No   Transportation Needs: Low Risk  (10/1/2024)    Transportation Needs      Within the past 12 months, has lack of transportation kept you from medical appointments, getting your medicines, non-medical meetings or appointments, work, or from getting things that you need?: No   Physical Activity: Sufficiently Active (10/1/2024)    Exercise Vital Sign     Days of Exercise per Week: 3 days     Minutes of Exercise per Session: 60 min   Stress: Stress Concern Present (10/1/2024)    [place] [place] [employer] - Occupational Stress Questionnaire     Feeling of Stress : To some extent   Social Connections: Unknown (10/1/2024)    Social Connection and Isolation Panel [NHANES]     Frequency of Communication with Friends and Family: Not on file     Frequency of Social Gatherings with Friends and Family: More than three times a week     Attends [employer] Services: Not on file     Active Member of Clubs or Organizations: Not on file     Attends Club or Organization Meetings: Not on file     Marital Status: Not on file   Interpersonal Safety: Low Risk  (10/4/2024)    Interpersonal Safety     Do you feel physically and emotionally safe where you currently live?: Yes     Within the past 12 months, have you been hit, slapped, kicked or otherwise physically hurt by someone?: No     Within the past 12 months, have you been humiliated or emotionally abused in other ways by your partner or ex-partner?: No   Housing Stability: Low Risk  (10/1/2024)    Housing Stability     Do you have housing? : Yes     Are you worried about losing your housing?: No            Past Medical History:     Past Medical History:   Diagnosis Date    CAD (coronary artery disease) 07/19/2010    Cancer (H) skin cancer removed    Basil cell    History of angina pectoris 03/01/2018    Hyperlipidemia LDL goal < 100 07/19/2010    Hypertension 07/19/2010    Other psoriasis 12/15/2005              Past Surgical History:     Past Surgical History:   Procedure Laterality Date    COLONOSCOPY  02/10/10    COLONOSCOPY N/A  "12/15/2021    Procedure: COLONOSCOPY;  Surgeon: Rehan Chang MD;  Location: [facility]    HC LAPAROSCOPY, SURGICAL; APPENDECTOMY  7/17/10    REMOVAL OF SPERM DUCT(S)                Allergies:   No known drug allergy       Data:   All laboratory data reviewed:    Recent Labs   Lab Test 06/03/25  0838 05/09/24  0801 05/02/23  0827   LDL 45 52 66   HDL 41 41 37*   NHDL 61 64 76   CHOL 102 105 113   TRIG 79 60 52       Lab Results   Component Value Date    WBC 5.7 05/02/2023    WBC 6.2 05/04/2021    RBC 4.97 05/02/2023    RBC 5.20 05/04/2021    HGB 15.7 05/02/2023    HGB 16.5 05/04/2021    HCT 45.3 05/02/2023    HCT 47.9 05/04/2021    MCV 91 05/02/2023    MCV 92 05/04/2021    MCH 31.6 05/02/2023    MCH 31.7 05/04/2021    MCHC 34.7 05/02/2023    MCHC 34.4 05/04/2021    RDW 12.5 05/02/2023    RDW 12.9 05/04/2021     05/02/2023     05/04/2021       Lab Results   Component Value Date     06/03/2025     05/04/2021    POTASSIUM 4.2 06/03/2025    POTASSIUM 4.4 04/19/2022    POTASSIUM 4.4 05/04/2021    CHLORIDE 104 06/03/2025    CHLORIDE 108 04/19/2022    CHLORIDE 107 05/04/2021    CO2 27 06/03/2025    CO2 30 04/19/2022    CO2 29 05/04/2021    ANIONGAP 10 06/03/2025    ANIONGAP 3 04/19/2022    ANIONGAP 3 05/04/2021    GLC 93 06/03/2025     (H) 04/19/2022    GLC 92 05/04/2021    BUN 18.0 06/03/2025    BUN 14 04/19/2022    BUN 15 05/04/2021    CR 0.85 06/03/2025    CR 0.92 05/04/2021    GFRESTIMATED >90 06/03/2025    GFRESTIMATED 89 05/04/2021    GFRESTBLACK >90 05/04/2021    JUAN JOSE 9.2 06/03/2025    JUAN JOSE 9.2 05/04/2021      Lab Results   Component Value Date    AST 33 05/02/2023    AST 23 05/04/2021    ALT 55 05/09/2024    ALT 53 05/04/2021       No results found for: \"A1C\"    No results found for: \"INR\"      MENDOZA BATES MD  [facility] [place]  "

## 2025-06-25 NOTE — PROGRESS NOTES
"  General Cardiology Clinic Progress Note  Leonides Paul MRN# [id number]   YOB: 1960 Age: 65 year old       Reason for visit: ***    History of presenting illness:    I had the opportunity to see Leonides Paul at [facility] today for ***              Assessment and Plan:     ASSESSMENT:    ***           Orders this Visit:  No orders of the defined types were placed in this encounter.    No orders of the defined types were placed in this encounter.    There are no discontinued medications.    Today's clinic visit entailed:  {[facility] 2021 Documentation (Optional):986162}  {2021 E&M time:225783}  Provider  Link to [facility] Help Grid     {[facility] Level:508463}           Review of Systems:     Review of Systems:  Skin:        Eyes:       ENT:       Respiratory:  Negative for shortness of breath, dyspnea on exertion, cough, wheezing  Cardiovascular:  Negative for, palpitations, chest pain, edema, fatigue, lightheadedness, dizziness    Gastroenterology:      Genitourinary:       Musculoskeletal:       Neurologic:  Negative for headaches, numbness or tingling of hands, numbness or tingling of feet  Psychiatric:       Heme/Lymph/Imm:       Endocrine:                 Physical Exam:     Vitals: /84 (BP Location: Right arm, Patient Position: Sitting, Cuff Size: Adult Regular)   Pulse 52   Ht 1.824 m (5' 11.8\")   Wt 88.5 kg (195 lb)   SpO2 98%   BMI 26.59 kg/m    Constitutional: ***Well nourished and in no apparent distress.  Eyes: Pupils equal, round. Sclerae anicteric.   HEENT: Normocephalic, atraumatic.   Neck: Supple. JVD ***  Respiratory: Breathing non-labored. Lungs clear to auscultation bilaterally. No crackles, wheezes, rhonchi, or rales.  Cardiovascular: *** Regular rate and rhythm, normal S1 and S2. No murmur, rub, or gallop.  Skin: Warm, dry. No rashes, cyanosis, or xanthelasma.  Extremities: No edema.***  Neurologic: No gross motor deficits. Alert, awake, and oriented to person, place and " time.  Psychiatric: Affect appropriate.             Medications:     Current Outpatient Medications   Medication Sig Dispense Refill    ACE/ARB NOT PRESCRIBED, INTENTIONAL, by Other route continuous prn      amLODIPine (NORVASC) 5 MG tablet Take 1 tablet (5 mg) by mouth daily. 90 tablet 0    atorvastatin (LIPITOR) 80 MG tablet Take 1 tablet (80 mg) by mouth daily. 90 tablet 4    clopidogrel (PLAVIX) 75 MG tablet Take 1 tablet (75 mg) by mouth daily. 90 tablet 0    ezetimibe (ZETIA) 10 MG tablet Take 1 tablet (10 mg) by mouth daily. 90 tablet 0    guselkumab (TREMFYA) 100 MG/ML SOSY Inject 100 mg Subcutaneous once      metoprolol succinate ER (TOPROL XL) 50 MG 24 hr tablet Take 1 tablet (50 mg) by mouth daily. 90 tablet 0    tadalafil (CIALIS) 20 MG tablet TAKE 1 TABLET DAILY AS     NEEDED Strength: 20 mg 90 tablet 2       Family History   Problem Relation Age of Onset    Allergies Daughter         environment al    Allergies Son         same as sister    Alzheimer Disease Father          at age 73    Blood Disease Father     Cancer Maternal Grandfather         [place] lung -    Heart Disease Maternal Grandmother     Psychotic Disorder Brother        Social History     Socioeconomic History    Marital status:      Spouse name: Not on file    Number of children: Not on file    Years of education: Not on file    Highest education level: Not on file   Occupational History    Not on file   Tobacco Use    Smoking status: Former     Current packs/day: 0.00     Average packs/day: 2.0 packs/day for 24.0 years (48.0 ttl pk-yrs)     Types: Cigarettes     Start date: 1975     Quit date: 1987     Years since quittin.8    Smokeless tobacco: Never    Tobacco comments:     none since    Vaping Use    Vaping status: Never Used   Substance and Sexual Activity    Alcohol use: Yes     Comment: 2 drinks a week    Drug use: No    Sexual activity: Yes     Partners: Female     Birth control/protection:  Male Surgical, Female Surgical   Other Topics Concern    Parent/sibling w/ CABG, MI or angioplasty before 65F 55M? No   Social History Narrative    Not on file     Social Drivers of Health     Financial Resource Strain: Low Risk  (10/1/2024)    Financial Resource Strain     Within the past 12 months, have you or your family members you live with been unable to get utilities (heat, electricity) when it was really needed?: No   Food Insecurity: Low Risk  (10/1/2024)    Food Insecurity     Within the past 12 months, did you worry that your food would run out before you got money to buy more?: No     Within the past 12 months, did the food you bought just not last and you didn t have money to get more?: No   Transportation Needs: Low Risk  (10/1/2024)    Transportation Needs     Within the past 12 months, has lack of transportation kept you from medical appointments, getting your medicines, non-medical meetings or appointments, work, or from getting things that you need?: No   Physical Activity: Sufficiently Active (10/1/2024)    Exercise Vital Sign     Days of Exercise per Week: 3 days     Minutes of Exercise per Session: 60 min   Stress: Stress Concern Present (10/1/2024)    [place] [employer]ational [employer] - Occupational Stress Questionnaire     Feeling of Stress : To some extent   Social Connections: Unknown (10/1/2024)    Social Connection and Isolation Panel [NHANES]     Frequency of Communication with Friends and Family: Not on file     Frequency of Social Gatherings with Friends and Family: More than three times a week     Attends Adventism Services: Not on file     Active Member of Clubs or Organizations: Not on file     Attends Club or Organization Meetings: Not on file     Marital Status: Not on file   Interpersonal Safety: Low Risk  (10/4/2024)    Interpersonal Safety     Do you feel physically and emotionally safe where you currently live?: Yes     Within the past 12 months, have you been hit,  slapped, kicked or otherwise physically hurt by someone?: No     Within the past 12 months, have you been humiliated or emotionally abused in other ways by your partner or ex-partner?: No   Housing Stability: Low Risk  (10/1/2024)    Housing Stability     Do you have housing? : Yes     Are you worried about losing your housing?: No            Past Medical History:     Past Medical History:   Diagnosis Date    CAD (coronary artery disease) 07/19/2010    Cancer (H) skin cancer removed    Basil cell    History of angina pectoris 03/01/2018    Hyperlipidemia LDL goal < 100 07/19/2010    Hypertension 07/19/2010    Other psoriasis 12/15/2005              Past Surgical History:     Past Surgical History:   Procedure Laterality Date    COLONOSCOPY  02/10/10    COLONOSCOPY N/A 12/15/2021    Procedure: COLONOSCOPY;  Surgeon: Rehan Chang MD;  Location: [facility]    HC LAPAROSCOPY, SURGICAL; APPENDECTOMY  7/17/10    REMOVAL OF SPERM DUCT(S)                Allergies:   No known drug allergy       Data:   All laboratory data reviewed:    Recent Labs   Lab Test 06/03/25  0838 05/09/24  0801 05/02/23  0827   LDL 45 52 66   HDL 41 41 37*   NHDL 61 64 76   CHOL 102 105 113   TRIG 79 60 52       Lab Results   Component Value Date    WBC 5.7 05/02/2023    WBC 6.2 05/04/2021    RBC 4.97 05/02/2023    RBC 5.20 05/04/2021    HGB 15.7 05/02/2023    HGB 16.5 05/04/2021    HCT 45.3 05/02/2023    HCT 47.9 05/04/2021    MCV 91 05/02/2023    MCV 92 05/04/2021    MCH 31.6 05/02/2023    MCH 31.7 05/04/2021    MCHC 34.7 05/02/2023    MCHC 34.4 05/04/2021    RDW 12.5 05/02/2023    RDW 12.9 05/04/2021     05/02/2023     05/04/2021       Lab Results   Component Value Date     06/03/2025     05/04/2021    POTASSIUM 4.2 06/03/2025    POTASSIUM 4.4 04/19/2022    POTASSIUM 4.4 05/04/2021    CHLORIDE 104 06/03/2025    CHLORIDE 108 04/19/2022    CHLORIDE 107 05/04/2021    CO2 27 06/03/2025    CO2 30 04/19/2022    CO2 29 05/04/2021  "   ANIONGAP 10 06/03/2025    ANIONGAP 3 04/19/2022    ANIONGAP 3 05/04/2021    GLC 93 06/03/2025     (H) 04/19/2022    GLC 92 05/04/2021    BUN 18.0 06/03/2025    BUN 14 04/19/2022    BUN 15 05/04/2021    CR 0.85 06/03/2025    CR 0.92 05/04/2021    GFRESTIMATED >90 06/03/2025    GFRESTIMATED 89 05/04/2021    GFRESTBLACK >90 05/04/2021    JUAN JOSE 9.2 06/03/2025    JUAN JOSE 9.2 05/04/2021      Lab Results   Component Value Date    AST 33 05/02/2023    AST 23 05/04/2021    ALT 55 05/09/2024    ALT 53 05/04/2021       No results found for: \"A1C\"    No results found for: \"INR\"      MENDOZA BATES MD  [facility]  "

## 2025-06-25 NOTE — LETTER
6/25/2025    Gregory G. Schoen, MD  [address] [place] [address]    RE: Leonides Paul       Dear Colleague,     I had the pleasure of seeing Leonides Paul in the [facility].    General Cardiology Clinic Progress Note  Leonides Paul MRN# [id number]   YOB: 1960 Age: 65 year old       Reason for visit: Coronary artery disease    History of presenting illness:       I had the opportunity to see Leonides Paul at [facility] today for coronary artery disease and chronic stable angina.  His mid LAD is chronically occluded and supplied by collaterals which we have been managing medically for many years.  He continues to play golf and goes for regular daily walks, sometimes jogging for short distances.  Only when he exerts himself more strenuously, with running or walking fast uphill, does he experience some mild chest discomfort.  This is a very stable pattern of chest discomfort and he knows his limits.  He has also noticed a little bit of back discomfort at those times as well.  If he slows down or stops, the distal chest discomfort resolves immediately.     He has no other symptoms such as shortness of breath palpitations lightheadedness or syncope.     His cholesterol numbers this year are excellent with an LDL of 45 and HDL 41.  His basic metabolic panel is normal.  ALT is normal as well.    On examination today his blood pressure is 132/84, heart rate 52, and weight is stable at 195 pounds.  His lungs are clear.  Heart rhythm is regular.  He has no cardiac murmurs or carotid bruits                 Assessment and Plan:     ASSESSMENT:    Mr. Leonides Paul here is a 65-year-old gentleman with a history of coronary artery disease including a chronically occluded LAD after several stenting procedures.  He has collateral blood flow into the LAD territory and has had normal left ventricular function.  We have been managing this chronic ischemic coronary  disease situation with medical therapy for a number of years and he has stable angina only with higher levels of exertion.  He has had no change in symptoms this year.    His cardiac risk factors are under excellent control, including hypertension and dyslipidemia.    I will have him follow-up with me again next year, repeat his laboratory studies, and review his symptoms.  I have urged him to contact me if he has any change in his symptoms prior to that visit in which case I would evaluate his coronary disease situation with repeat coronary angiography.    Tay Hernandez MD         Orders this Visit:  Orders Placed This Encounter   Procedures     Basic metabolic panel     Lipid Profile     ALT     Follow-Up with Cardiology     Orders Placed This Encounter   Medications     amLODIPine (NORVASC) 5 MG tablet     Sig: Take 1 tablet (5 mg) by mouth daily.     Dispense:  90 tablet     Refill:  3     atorvastatin (LIPITOR) 80 MG tablet     Sig: Take 1 tablet (80 mg) by mouth daily.     Dispense:  90 tablet     Refill:  4     clopidogrel (PLAVIX) 75 MG tablet     Sig: Take 1 tablet (75 mg) by mouth daily.     Dispense:  90 tablet     Refill:  3     ezetimibe (ZETIA) 10 MG tablet     Sig: Take 1 tablet (10 mg) by mouth daily.     Dispense:  90 tablet     Refill:  3     metoprolol succinate ER (TOPROL XL) 50 MG 24 hr tablet     Sig: Take 1 tablet (50 mg) by mouth daily.     Dispense:  90 tablet     Refill:  3     Medications Discontinued During This Encounter   Medication Reason     metoprolol succinate ER (TOPROL XL) 50 MG 24 hr tablet Reorder (No AVS)     ezetimibe (ZETIA) 10 MG tablet Reorder (No AVS)     clopidogrel (PLAVIX) 75 MG tablet Reorder (No AVS)     atorvastatin (LIPITOR) 80 MG tablet Reorder (No AVS)     amLODIPine (NORVASC) 5 MG tablet Reorder (No AVS)       Today's clinic visit entailed:    35 minutes spent by me on the date of the encounter doing chart review, history and exam, documentation and further  "activities per the note  Provider  Link to MDM Help Grid     The level of medical decision making during this visit was of high complexity.           Review of Systems:     Review of Systems:  Skin:        Eyes:       ENT:       Respiratory:  Negative for shortness of breath, dyspnea on exertion, cough, wheezing  Cardiovascular:  Negative for, palpitations, chest pain, edema, fatigue, lightheadedness, dizziness    Gastroenterology:      Genitourinary:       Musculoskeletal:       Neurologic:  Negative for headaches, numbness or tingling of hands, numbness or tingling of feet  Psychiatric:       Heme/Lymph/Imm:       Endocrine:                 Physical Exam:     Vitals: /84 (BP Location: Right arm, Patient Position: Sitting, Cuff Size: Adult Regular)   Pulse 52   Ht 1.824 m (5' 11.8\")   Wt 88.5 kg (195 lb)   SpO2 98%   BMI 26.59 kg/m    Constitutional: Well nourished and in no apparent distress.  Eyes: Pupils equal, round. Sclerae anicteric.   HEENT: Normocephalic, atraumatic.   Neck: Supple. JVD   Respiratory: Breathing non-labored. Lungs clear to auscultation bilaterally. No crackles, wheezes, rhonchi, or rales.  Cardiovascular:  Regular rate and rhythm, normal S1 and S2. No murmur, rub, or gallop.  Skin: Warm, dry. No rashes, cyanosis, or xanthelasma.  Extremities: No edema.  Neurologic: No gross motor deficits. Alert, awake, and oriented to person, place and time.  Psychiatric: Affect appropriate.             Medications:     Current Outpatient Medications   Medication Sig Dispense Refill     ACE/ARB NOT PRESCRIBED, INTENTIONAL, by Other route continuous prn       amLODIPine (NORVASC) 5 MG tablet Take 1 tablet (5 mg) by mouth daily. 90 tablet 3     atorvastatin (LIPITOR) 80 MG tablet Take 1 tablet (80 mg) by mouth daily. 90 tablet 4     clopidogrel (PLAVIX) 75 MG tablet Take 1 tablet (75 mg) by mouth daily. 90 tablet 3     ezetimibe (ZETIA) 10 MG tablet Take 1 tablet (10 mg) by mouth daily. 90 tablet " 3     guselkumab (TREMFYA) 100 MG/ML SOSY Inject 100 mg Subcutaneous once       metoprolol succinate ER (TOPROL XL) 50 MG 24 hr tablet Take 1 tablet (50 mg) by mouth daily. 90 tablet 3     tadalafil (CIALIS) 20 MG tablet TAKE 1 TABLET DAILY AS     NEEDED Strength: 20 mg 90 tablet 2       Family History   Problem Relation Age of Onset     Allergies Daughter         environment al     Allergies Son         same as sister     Alzheimer Disease Father          at age 73     Blood Disease Father      Cancer Maternal Grandfather         [place] lung -     Heart Disease Maternal Grandmother      Psychotic Disorder Brother        Social History     Socioeconomic History     Marital status:      Spouse name: Not on file     Number of children: Not on file     Years of education: Not on file     Highest education level: Not on file   Occupational History     Not on file   Tobacco Use     Smoking status: Former     Current packs/day: 0.00     Average packs/day: 2.0 packs/day for 24.0 years (48.0 ttl pk-yrs)     Types: Cigarettes     Start date: 1975     Quit date: 1987     Years since quittin.8     Smokeless tobacco: Never     Tobacco comments:     none since    Vaping Use     Vaping status: Never Used   Substance and Sexual Activity     Alcohol use: Yes     Comment: 2 drinks a week     Drug use: No     Sexual activity: Yes     Partners: Female     Birth control/protection: Male Surgical, Female Surgical   Other Topics Concern     Parent/sibling w/ CABG, MI or angioplasty before 65F 55M? No   Social History Narrative     Not on file     Social Drivers of Health     Financial Resource Strain: Low Risk  (10/1/2024)    Financial Resource Strain      Within the past 12 months, have you or your family members you live with been unable to get utilities (heat, electricity) when it was really needed?: No   Food Insecurity: Low Risk  (10/1/2024)    Food Insecurity      Within the past 12 months, did  you worry that your food would run out before you got money to buy more?: No      Within the past 12 months, did the food you bought just not last and you didn t have money to get more?: No   Transportation Needs: Low Risk  (10/1/2024)    Transportation Needs      Within the past 12 months, has lack of transportation kept you from medical appointments, getting your medicines, non-medical meetings or appointments, work, or from getting things that you need?: No   Physical Activity: Sufficiently Active (10/1/2024)    Exercise Vital Sign      Days of Exercise per Week: 3 days      Minutes of Exercise per Session: 60 min   Stress: Stress Concern Present (10/1/2024)    [employer] [employer] - Occupational Stress Questionnaire      Feeling of Stress : To some extent   Social Connections: Unknown (10/1/2024)    Social Connection and Isolation Panel [NHANES]      Frequency of Communication with Friends and Family: Not on file      Frequency of Social Gatherings with Friends and Family: More than three times a week      Attends [employer] Services: Not on file      Active Member of Clubs or Organizations: Not on file      Attends Club or Organization Meetings: Not on file      Marital Status: Not on file   Interpersonal Safety: Low Risk  (10/4/2024)    Interpersonal Safety      Do you feel physically and emotionally safe where you currently live?: Yes      Within the past 12 months, have you been hit, slapped, kicked or otherwise physically hurt by someone?: No      Within the past 12 months, have you been humiliated or emotionally abused in other ways by your partner or ex-partner?: No   Housing Stability: Low Risk  (10/1/2024)    Housing Stability      Do you have housing? : Yes      Are you worried about losing your housing?: No            Past Medical History:     Past Medical History:   Diagnosis Date     CAD (coronary artery disease) 07/19/2010     Cancer (H) skin cancer removed    Basil cell      History of angina pectoris 03/01/2018     Hyperlipidemia LDL goal < 100 07/19/2010     Hypertension 07/19/2010     Other psoriasis 12/15/2005              Past Surgical History:     Past Surgical History:   Procedure Laterality Date     COLONOSCOPY  02/10/10     COLONOSCOPY N/A 12/15/2021    Procedure: COLONOSCOPY;  Surgeon: Rehan Chang MD;  Location: [facility]     [facility] LAPAROSCOPY, SURGICAL; APPENDECTOMY  7/17/10     REMOVAL OF SPERM DUCT(S)                Allergies:   No known drug allergy       Data:   All laboratory data reviewed:    Recent Labs   Lab Test 06/03/25  0838 05/09/24  0801 05/02/23  0827   LDL 45 52 66   HDL 41 41 37*   NHDL 61 64 76   CHOL 102 105 113   TRIG 79 60 52       Lab Results   Component Value Date    WBC 5.7 05/02/2023    WBC 6.2 05/04/2021    RBC 4.97 05/02/2023    RBC 5.20 05/04/2021    HGB 15.7 05/02/2023    HGB 16.5 05/04/2021    HCT 45.3 05/02/2023    HCT 47.9 05/04/2021    MCV 91 05/02/2023    MCV 92 05/04/2021    MCH 31.6 05/02/2023    MCH 31.7 05/04/2021    MCHC 34.7 05/02/2023    MCHC 34.4 05/04/2021    RDW 12.5 05/02/2023    RDW 12.9 05/04/2021     05/02/2023     05/04/2021       Lab Results   Component Value Date     06/03/2025     05/04/2021    POTASSIUM 4.2 06/03/2025    POTASSIUM 4.4 04/19/2022    POTASSIUM 4.4 05/04/2021    CHLORIDE 104 06/03/2025    CHLORIDE 108 04/19/2022    CHLORIDE 107 05/04/2021    CO2 27 06/03/2025    CO2 30 04/19/2022    CO2 29 05/04/2021    ANIONGAP 10 06/03/2025    ANIONGAP 3 04/19/2022    ANIONGAP 3 05/04/2021    GLC 93 06/03/2025     (H) 04/19/2022    GLC 92 05/04/2021    BUN 18.0 06/03/2025    BUN 14 04/19/2022    BUN 15 05/04/2021    CR 0.85 06/03/2025    CR 0.92 05/04/2021    GFRESTIMATED >90 06/03/2025    GFRESTIMATED 89 05/04/2021    GFRESTBLACK >90 05/04/2021    JUAN JOSE 9.2 06/03/2025    JUAN JOSE 9.2 05/04/2021      Lab Results   Component Value Date    AST 33 05/02/2023    AST 23 05/04/2021    ALT 55 05/09/2024    ALT  "53 05/04/2021       No results found for: \"A1C\"    No results found for: \"INR\"      MENDOZA BATES MD  [facility]    Thank you for allowing me to participate in the care of your patient.      Sincerely,     MENDOZA BATES MD     [facility]  cc:   Gregory G Schoen, MD  [address] [place] [address] [place] [id number]      "

## 2025-06-28 LAB
GAMMA INTERFERON BACKGROUND BLD IA-ACNC: 0.06 IU/ML
M TB IFN-G BLD-IMP: NEGATIVE
M TB IFN-G CD4+ BCKGRND COR BLD-ACNC: 9.94 IU/ML
MITOGEN IGNF BCKGRD COR BLD-ACNC: 0.01 IU/ML
MITOGEN IGNF BCKGRD COR BLD-ACNC: 0.01 IU/ML

## 2025-07-21 ENCOUNTER — MYC REFILL (OUTPATIENT)
Dept: FAMILY MEDICINE | Facility: CLINIC | Age: 65
End: 2025-07-21
Payer: COMMERCIAL

## 2025-07-21 DIAGNOSIS — N52.2 DRUG-INDUCED ERECTILE DYSFUNCTION: ICD-10-CM

## 2025-07-21 RX ORDER — TADALAFIL 20 MG/1
TABLET ORAL
Qty: 90 TABLET | Refills: 0 | Status: SHIPPED | OUTPATIENT
Start: 2025-07-21

## 2025-09-04 ENCOUNTER — PATIENT OUTREACH (OUTPATIENT)
Dept: CARE COORDINATION | Facility: CLINIC | Age: 65
End: 2025-09-04
Payer: COMMERCIAL

## (undated) DEVICE — TUBING SUCTION 12"X1/4" N612

## (undated) DEVICE — LUBRICATING JELLY 4.25OZ

## (undated) DEVICE — SOL WATER IRRIG 1000ML BOTTLE 07139-09

## (undated) DEVICE — KIT ENDO TURNOVER/PROCEDURE CARRY-ON 101822

## (undated) DEVICE — GLOVE EXAM NITRILE LG

## (undated) RX ORDER — PROPOFOL 10 MG/ML
INJECTION, EMULSION INTRAVENOUS
Status: DISPENSED
Start: 2021-12-15

## (undated) RX ORDER — LIDOCAINE HYDROCHLORIDE 20 MG/ML
INJECTION, SOLUTION EPIDURAL; INFILTRATION; INTRACAUDAL; PERINEURAL
Status: DISPENSED
Start: 2021-12-15